# Patient Record
Sex: FEMALE | Race: WHITE | NOT HISPANIC OR LATINO | Employment: FULL TIME | URBAN - METROPOLITAN AREA
[De-identification: names, ages, dates, MRNs, and addresses within clinical notes are randomized per-mention and may not be internally consistent; named-entity substitution may affect disease eponyms.]

---

## 2017-01-03 DIAGNOSIS — F33.2 MAJOR DEPRESSIVE DISORDER, RECURRENT, SEVERE WITHOUT PSYCHOTIC FEATURES (H): Primary | ICD-10-CM

## 2017-01-03 RX ORDER — ARIPIPRAZOLE 10 MG/1
10 TABLET ORAL DAILY
Qty: 30 TABLET | Refills: 0 | Status: SHIPPED | OUTPATIENT
Start: 2017-01-03 | End: 2017-01-06

## 2017-01-03 NOTE — TELEPHONE ENCOUNTER
Last appt: 11/29/16  RTC: not specified  Can: none  No show: none  Pen: 1/6/17    Rec'd refill request for Abilify 10 mg QD from [facility], 30 d/s last filled 11/10/16.    Med filled per  Protocol.

## 2017-01-06 ENCOUNTER — OFFICE VISIT (OUTPATIENT)
Dept: PSYCHIATRY | Facility: CLINIC | Age: 27
End: 2017-01-06
Attending: PSYCHIATRY & NEUROLOGY
Payer: COMMERCIAL

## 2017-01-06 DIAGNOSIS — F33.2 MAJOR DEPRESSIVE DISORDER, RECURRENT, SEVERE WITHOUT PSYCHOTIC FEATURES (H): Primary | ICD-10-CM

## 2017-01-06 DIAGNOSIS — F33.2 SEVERE EPISODE OF RECURRENT MAJOR DEPRESSIVE DISORDER, WITHOUT PSYCHOTIC FEATURES (H): ICD-10-CM

## 2017-01-06 DIAGNOSIS — F98.8 ATTENTION DEFICIT DISORDER: ICD-10-CM

## 2017-01-06 RX ORDER — ARIPIPRAZOLE 10 MG/1
10 TABLET ORAL DAILY
Qty: 30 TABLET | Refills: 2 | Status: SHIPPED | OUTPATIENT
Start: 2017-01-06 | End: 2017-05-24

## 2017-01-06 RX ORDER — DEXTROAMPHETAMINE SULFATE 10 MG/1
10 TABLET ORAL DAILY
Qty: 30 TABLET | Refills: 0 | Status: SHIPPED | OUTPATIENT
Start: 2017-01-06 | End: 2017-02-13

## 2017-01-06 RX ORDER — LAMOTRIGINE 200 MG/1
200 TABLET ORAL DAILY
Qty: 30 TABLET | Refills: 2 | Status: SHIPPED | OUTPATIENT
Start: 2017-01-06 | End: 2017-02-13

## 2017-01-06 RX ORDER — LISDEXAMFETAMINE DIMESYLATE 70 MG/1
140 CAPSULE ORAL 2 TIMES DAILY
Qty: 120 CAPSULE | Refills: 0 | Status: SHIPPED | OUTPATIENT
Start: 2017-01-06 | End: 2017-02-13

## 2017-01-09 ENCOUNTER — OFFICE VISIT (OUTPATIENT)
Dept: PSYCHIATRY | Facility: CLINIC | Age: 27
End: 2017-01-09
Attending: PSYCHOLOGIST
Payer: COMMERCIAL

## 2017-01-09 DIAGNOSIS — F32.9 MAJOR DEPRESSIVE DISORDER WITH SINGLE EPISODE, REMISSION STATUS UNSPECIFIED: ICD-10-CM

## 2017-01-09 DIAGNOSIS — F50.819 BINGE EATING DISORDER: Primary | ICD-10-CM

## 2017-01-09 NOTE — PROGRESS NOTES
"Erick is generally feeling better, but school is not in session now.   She needs \"6 months of mental stability\" before she can have bariatric surgery.  Not involved in any out of home activities.  Uses CPAP regularly.  School at [employer] starts on Monday.  Last semester she got an A and a B.  Her mood is better.  She has excess sleep.  She's been overeating \"a little bit\" but it's overall improved.  Concentration is good.  Energy and motivation are better than had been but energy very low.  She enjoyed her sister's birthday.  No homicidal ideation.  Passive suicidal ideation.  No flashbacks.  No auditory or visual hallucinations.  No paranoid or ideas of reference.  Anxiety is not problematic.  Infrequent panic attacks.  No OCD.  No alcohol use for the past several months.  No substance use.   MENTAL STATUS EXAMINATION:  Erick is cooperative.  She has good grooming.  Affect is bright. Movements are normal.  No psychosis or homicidal ideation.  Adequate recent recall.  Eye contact is good.  Mood is reactive.  Speech rate is normal.  Thought process is normal.  Passive wishes of death.  Orientation x 4.   CURRENT MEDICATIONS:   1. Abilify 10 mg   2. Wellbutrin  mg   3. Prozac 80 mg   4. Lamictal 200 mg   5. Vyvanse 140 mg BID   6. Losartan   7. Dexedrine 10 mg   No side effects.   ASSESSMENT:  MDD recurrent, moderate severity.  Morbid obesity.   PLAN:  No change in medications.  Starts DBT on 2017.  Follow up 2nd week in February.   This was a 25 minute face-to-face encounter of which >50% of the time was spent in reviewing what do with meds after bariatric surgery.         MARVEL ZAVALA MD             D: 2017 13:45   T: 2017 14:07   MT: [id number]      Name:     ERICK ORELLANA   MRN:      [id number]        Account:      [id number]   :      1990           Service Date: 2017      Document: [id number]    "

## 2017-01-11 ENCOUNTER — OFFICE VISIT (OUTPATIENT)
Dept: PSYCHIATRY | Facility: CLINIC | Age: 27
End: 2017-01-11
Attending: PSYCHOLOGIST
Payer: COMMERCIAL

## 2017-01-11 DIAGNOSIS — F98.8 ATTENTION DEFICIT DISORDER: ICD-10-CM

## 2017-01-11 DIAGNOSIS — F32.9 MAJOR DEPRESSIVE DISORDER WITH SINGLE EPISODE, REMISSION STATUS UNSPECIFIED: Primary | ICD-10-CM

## 2017-01-11 DIAGNOSIS — F50.819 BINGE EATING DISORDER: ICD-10-CM

## 2017-01-12 NOTE — PROGRESS NOTES
"DBT Skills Group       Group Time:  2 hours--First Group    DBT (Dialectic Behavior Therapy) is a cognitive behavioral therapy model that uses modeling, behavior rehearsal and guided participation to teach new skills and offer the patient and their family the opportunity to practice new behaviors.    Client: Lexus Uribe  Date: January 9, 2017  Number of Participants: 5    Diagnostic Criteria for group participation: History of suicidal ideation or self-injurious behavior in the past six months.    Diagnosis: Depression, Mild          Binge-Eating Disorder (F50.81)      On time? YES,     How Late?0 minutes    Group Topic for Today: Introductions of new members--Lexus's first group, [place] to Mindfulness Module and Wise Mind, The \"What\" Skills.    Diary Card? YES,      Response to Intervention Client used:  Wise Mind, Nonjudgmental stance: self/others    Subjective Observation: Lexus made it to group from her [facility] class and then had a 5:30pm class. She introduced herself and reported that she had a new kitten and kitten's mom. She spent 3 hours on Saturday \"being nonjudgmental\" reportedly.     Progress towards TX Goals: Minimal    Objective Observation: Attentive   , Active Participant, Cooperative    "

## 2017-01-16 ENCOUNTER — TELEPHONE (OUTPATIENT)
Dept: PSYCHIATRY | Facility: CLINIC | Age: 27
End: 2017-01-16

## 2017-01-16 NOTE — Clinical Note
Prior Authorization Appeal- Vyvanse (lisdexamfetamine) 70 mg caps  To:  Prescription Claim Appeals [facility]  [address] [id number]  [place] [address]  Fax: [phone]    Re: Lexus Uribe  : 1990  ID: [id number]    [employer] Appeals,    Please accept this letter as an Appeal to the Prior Authorization denial of this pt s Vyvanse 70 mg tab prescription (#120 caps/30 days).  Per the PA denial letter, this medication is only covered if it does not exceed the maximum safe dose.   Patient has been on this medication since  for the treatment of Attention deficit disorder, although has trialed and failed multiple medications (see below).  She has taken greater than the recommended maximum dose of Vyvanse for several years, with current dosing of 140 mg twice daily since prior to 2016.  Pt is tolerating this medication well and not having any side effects at current dose.  Lower doses have been trialed and have been ineffective for pt's focus, control, and impulses. Current dose has proven itself effective in addressing ADHD symptoms and is noteworthy for it's effectiveness in keeping pt organized, focused, and less impulsive. Pt is currently able to attend college and has been successful in keeping up in coursework at current dose.    Pt also has a diagnosis of Major depressive d/o, recurrent and severe.  Pt's mood and ADHD symptoms are currently stable, so there is no clinical indication to change medication regimen.  Pt has recently changed insurance plans.  Vyvanse 140 mg BID was previously authorized by private insurances through [employer], [employer], and [employer].  Please authorize medication to allow for continued symptom mgmt and to prevent decompensation, as mood symptoms will likely worsen secondary to medication not being approved.    Previously Tried and Failed Therapies:  -Concerta (prior to 1st appt here on 2/23/15), ineffective, wore off too soon  -Methylin  (prior to 1st appt here on  2/23/15), ineffective, wore off too soon  -Ritalin (prior to 1st appt here on 2/23/15), ineffective, wore off too soon  -Adderall XR (prior to 1st appt here on 2/23/15), ineffective, wore off too soon, irritable  -Dextrostat is currently used for afternoon studying  -Vyvanse at lower does, wore off too soon

## 2017-01-16 NOTE — TELEPHONE ENCOUNTER
Prior Authorization Retail Medication Request  Medication/Dose: Vyvanse 70 MG Capsule, 2 caps po BID, #120=30 d/s  Diagnosis and ICD code: Attention deficit disorder [F98.8]     Previously Tried and Failed Therapies:  -Concerta (prior to 1st appt here on 2/23/15), ineffective, wore off too soon  -Methylin  (prior to 1st appt here on 2/23/15), ineffective, wore off too soon  -Ritalin (prior to 1st appt here on 2/23/15), ineffective, wore off too soon  -Adderall XR (prior to 1st appt here on 2/23/15), ineffective, wore off too soon, irritable  -Dextrostat is currently used for afternoon studying  -Vyvanse at lower does, wore off too soon    Rationale:  Pt has taken Vyvanse 140 mg BID for several years.  Lower doses have been trialed and are only partially therapeutic.  It has proven itself effective in addressing ADHD symptoms.  Without currently effective dosing she will not be successful in school.  She has no side effects with current dosing.     Insurance ID (if provided): [employer] ID # [id number]  Insurance Phone (if provided): [phone]    If you received a fax notification from an outside Pharmacy:  [id number]   Pharmacy #:[phone]    Pharmacy Fax [phone]

## 2017-01-19 ENCOUNTER — OFFICE VISIT (OUTPATIENT)
Dept: PSYCHIATRY | Facility: CLINIC | Age: 27
End: 2017-01-19
Attending: PSYCHOLOGIST
Payer: COMMERCIAL

## 2017-01-19 DIAGNOSIS — F50.819 BINGE EATING DISORDER: ICD-10-CM

## 2017-01-19 DIAGNOSIS — F32.9 MAJOR DEPRESSIVE DISORDER WITH SINGLE EPISODE, REMISSION STATUS UNSPECIFIED: Primary | ICD-10-CM

## 2017-01-19 DIAGNOSIS — F98.8 ATTENTION DEFICIT DISORDER: ICD-10-CM

## 2017-01-19 NOTE — TELEPHONE ENCOUNTER
1/16/17 AT 1520:  LVM FOR PT REQUESTING CB TO DISCUSS OTHER MEDS TRIED AND LOWER DOSES OF VYVANSE THAT WERE TRIED AND THE RESULTS OF THOSE.  WILL COMPLETE PA WHEN PT CALLS BACK WITH INFO.      1/19/17 AT 1320:  SPOKE WITH PT.  COMPLETED PA INFO BELOW.  SENT TO PA TEAM.

## 2017-01-23 ENCOUNTER — OFFICE VISIT (OUTPATIENT)
Dept: PSYCHIATRY | Facility: CLINIC | Age: 27
End: 2017-01-23
Attending: PSYCHOLOGIST
Payer: COMMERCIAL

## 2017-01-23 DIAGNOSIS — F50.819 BINGE EATING DISORDER: ICD-10-CM

## 2017-01-23 DIAGNOSIS — F32.9 MAJOR DEPRESSIVE DISORDER WITH SINGLE EPISODE, REMISSION STATUS UNSPECIFIED: Primary | ICD-10-CM

## 2017-01-23 NOTE — PROGRESS NOTES
" Psychotherapy, 60 minutes  Dx:  Depression- (F32.9);Attention Deficit (F98.8) Obesity     S/O:  \"I keep avoiding Xena's calls. I don't really want to talk to her.\"  Lexus came in and stated above. She reported that she has been feeling like the therapy is more about Xena and \"saving my life\" than what I need.\"  We discussed how she is 26 years old and could d/c the therapy--especially since her family pays out of pocket.     Other issues discussed:   1Reviewed diary card. Her and the nutrition came up with 6 oreos at a time--but then she eats them 3-4x a day. Discussed keeping desserts to 1 or 2 per day.  2.  Parents clean and cook for her.   3, She doesn't feel like she can work anywhere do to wt and lack of mobility. Lays on bed most of day..  5. Will start  DBT--continued  orienting her to our DBT. She doesn't remember many of the skills.  6.  Registered for 2 classes:  Women's study and economics. She believes she would be a Tommy. Her options are [facility], [place], [facility] or [facility] reportedly.    Assessment: Lexus came on time.  She is excited to start  DBT. She has a hx of self harm--cutting behavior at the age of 13 yrs \"experimenting\" and has binged drank on and off. . She believes that \"I thrive with structure.\" She is currently unemployed and being \"taken care of by her parents both financially in her apartment\" and also reports that \"my mother does all of the cleaning and cooking. She is socially isolated and seems to be waiting for the bariatric surgery before she makes any other changes due to the lack of mobility but is assuring therapist she is committed to doing dbt.     Plan:.Continue individual therapy. Do the diary card. Cont orientation for DBT.             "

## 2017-01-25 NOTE — TELEPHONE ENCOUNTER
[facility] Prior Authorization Team   Phone: [phone]  Fax: [phone]    PA Initiation    Medication: Vyvanse 70 MG Capsule  Insurance Company: [facility] - Phone [phone] Fax [phone]  Pharm[facility]illing the Rx[id number] - [place], [place] - [address] [place] [address]  Filling Pharmacy Phone: [phone]  Filling Pharmacy Fax:    Start Date: 1/25/2017

## 2017-01-26 NOTE — PROGRESS NOTES
"DBT Skills Group       Group Time:  2 hours--First Group    DBT (Dialectic Behavior Therapy) is a cognitive behavioral therapy model that uses modeling, behavior rehearsal and guided participation to teach new skills and offer the patient and their family the opportunity to practice new behaviors.    Client: Lexus Uribe  Date: January 23, 2017  Number of Participants:7    Diagnostic Criteria for group participation: History of suicidal ideation or self-injurious behavior in the past six months.    Diagnosis: Depression, Mild          Binge-Eating Disorder (F50.81)      On time? YES,     How Late?0 minutes    Group Topic for Today: Introductions of new members-- Mindfulness--\"How Skills.\" Reviewed the application of the What and How's Skills to get into [employer].    Diary Card? YES,      Response to Intervention Client used:  Wise Mind, Nonjudgmental stance: self/others    Subjective Observation: Lexus made it to group from her [facility] class and then had a 5:30pm class. She came a couple minutes late but in time to start the mindfulness. She was able to provide comments, feedback and ask a couple of questions.      Progress towards TX Goals: Minimal    Objective Observation: Attentive   , Active Participant, Cooperative    "

## 2017-01-30 NOTE — TELEPHONE ENCOUNTER
Insurance called with additional questions, these were answered and Pa is being sent for further review. Rep stated we should receive response within 24 hours. Will update when received.

## 2017-01-30 NOTE — PROGRESS NOTES
" Psychotherapy, 60 minutes  Dx:  Depression- (F32.9);Attention Deficit (F98.8) Obesity     S/O:  \"I  Did the diary card a little. I really didn't go anywhere b/c I have been sick.\"  \"My classes did start for school.\"  Lexus came in and reported above. We discussed how difficult it is for her to get around due to her wt--yet the doctor has decided that she has to wait 6 months and needs to loose wt and stablized inorder to get coverage.     Other issues discussed:   1Reviewed diary card. She is continuing to eat 3-4 servings of oreos a day.  Discussed keeping desserts to 1 or 2 per day.  2.  Parents clean and cook for her.   3, She doesn't feel like she can work anywhere do to wt and lack of mobility. Lays on bed most of day..  5. Will start  DBT--continued  orienting her to our DBT. She doesn't remember many of the skills.  6.  Registered for 2 classes:  Women's study and economics. She believes she would be a Tommy. Her options are [facility], [facility], [facility] or [facility].    Assessment: Lexus came on time. Concern regarding her not filling out the DBT card and she knows that this is mandatory.  She believes that \"I thrive with structure.\" She is currently unemployed and being \"taken care of by her parents both financially in her apartment\" and also reports that \"my mother does all of the cleaning and cooking. She is socially isolated and seems to be waiting for the bariatric surgery before she makes any other changes due to the lack of mobility but is assuring therapist she is committed to doing dbt.     Plan:.Continue individual therapy. Do the diary card. Cont orientation for DBT.             "

## 2017-01-31 NOTE — TELEPHONE ENCOUNTER
PRIOR AUTHORIZATION DENIED    Medication: Vyvanse 70 MG Capsule- denied    Denial Date: 1/30/2017    Denial Rational: script is denied because Dr prescribed over the maximum safe dose.              Appeal Information:

## 2017-02-02 ENCOUNTER — OFFICE VISIT (OUTPATIENT)
Dept: PSYCHIATRY | Facility: CLINIC | Age: 27
End: 2017-02-02
Attending: PSYCHOLOGIST
Payer: COMMERCIAL

## 2017-02-02 DIAGNOSIS — F32.9 MAJOR DEPRESSIVE DISORDER WITH SINGLE EPISODE, REMISSION STATUS UNSPECIFIED: Primary | ICD-10-CM

## 2017-02-02 DIAGNOSIS — F50.819 BINGE EATING DISORDER: ICD-10-CM

## 2017-02-02 DIAGNOSIS — F98.8 ATTENTION DEFICIT DISORDER: ICD-10-CM

## 2017-02-02 NOTE — MR AVS SNAPSHOT
After Visit Summary   2/2/2017    Lexus Uribe    MRN: [id number]           Patient Information     Date Of Birth          1990        Visit Information        Provider Department      2/2/2017 2:00 PM Yuni Pond, PhD  Psychiatry Clinic        Today's Diagnoses     Major depressive disorder with single episode, remission status unspecified    -  1    Binge eating disorder        Attention deficit disorder           Follow-ups after your visit        Your next 10 appointments already scheduled     Feb 13, 2017  2:00 PM CST   Adult Med Follow UP with Bret Robledo MD   Psychiatry Clinic ([facility])    [address] [facility] [id number] [phone]            Feb 16, 2017  1:00 PM CST   Adult Psychotherapy with Yuni Pond, PhD    Psychiatry Clinic ([facility])    [address] [facility] [id number] [phone]              Who to contact     Please call your clinic at [phone] to:    Ask questions about your health    Make or cancel appointments    Discuss your medicines    Learn about your test results    Speak to your doctor   If you have compliments or concerns about an experience at your clinic, or if you wish to file a complaint, please contact [facility] Patient Relations at [phone] or email us at [email or web address]         Additional Information About Your Visit        MyChart Information     [employer] is an electronic gateway that provides easy, online access to your medical records. With [employer], you can request a clinic appointment, read your test results, renew a prescription or communicate with your care team.     To sign up for [employer] visit the website at [email or web address]   You will be asked to enter the access code listed below, as well as some personal information. Please follow the  directions to create your username and password.     Your access code is: [email or web address]  Expires: 2017  1:55 AM     Your access code will  in 90 days. If you need help or a new code, please contact your [facility] or call [phone] for assistance.        Care EveryWhere ID     This is your Care EveryWhere ID. This could be used by other organizations to access your [place] medical records  [id number]         Blood Pressure from Last 3 Encounters:   16 (!) 165/109   16 (!) 170/123   13 (!) 176/108    Weight from Last 3 Encounters:   16 (!) 191.1 kg (421 lb 3.2 oz)   16 (!) 188.8 kg (416 lb 3.2 oz)   16 (!) 181.4 kg (400 lb)              Today, you had the following     No orders found for display       Primary Care Provider Office Phone # Fax #    Renetta Shafer [phone]       [facility] [address] [facility] [address]        Thank you!     Thank you for choosing PSYCHIATRY CLINIC  for your care. Our goal is always to provide you with excellent care. Hearing back from our patients is one way we can continue to improve our services. Please take a few minutes to complete the written survey that you may receive in the mail after your visit with us. Thank you!             Your Updated Medication List - Protect others around you: Learn how to safely use, store and throw away your medicines at www.disposemymeds.org.          This list is accurate as of: 17 11:59 PM.  Always use your most recent med list.                   Brand Name Dispense Instructions for use    ARIPiprazole 10 MG tablet    ABILIFY    30 tablet    Take 1 tablet (10 mg) by mouth daily       buPROPion 150 MG 24 hr tablet    WELLBUTRIN XL    60 tablet    Take 2 tablets (300 mg) by mouth daily       dextroamphetamine 10 MG tablet    DEXTROSTAT    30 tablet    Take 1 tablet (10 mg) by mouth daily       FLUoxetine 40 MG capsule    PROzac    60  capsule    Take 2 capsules (80 mg) by mouth daily       lamoTRIgine 200 MG tablet    LaMICtal    30 tablet    Take 1 tablet (200 mg) by mouth daily       lisdexamfetamine 70 MG capsule    VYVANSE    120 capsule    Take 2 capsules (140 mg) by mouth 2 times daily       * LOSARTAN POTASSIUM PO      Take 25 mg by mouth daily       * losartan 25 MG tablet    COZAAR    1 tablet    Take 1 tablet (25 mg) by mouth daily       OMEPRAZOLE PO      Take 40 mg by mouth every morning       omeprazole-sodium bicarbonate  MG per capsule    ZEGERID     1 capsule       ZANTAC PO      Take 300 mg by mouth At Bedtime       * Notice:  This list has 2 medication(s) that are the same as other medications prescribed for you. Read the directions carefully, and ask your doctor or other care provider to review them with you.

## 2017-02-06 ENCOUNTER — OFFICE VISIT (OUTPATIENT)
Dept: PSYCHIATRY | Facility: CLINIC | Age: 27
End: 2017-02-06
Attending: PSYCHOLOGIST
Payer: COMMERCIAL

## 2017-02-06 DIAGNOSIS — F32.9 MAJOR DEPRESSIVE DISORDER WITH SINGLE EPISODE, REMISSION STATUS UNSPECIFIED: Primary | ICD-10-CM

## 2017-02-06 DIAGNOSIS — F33.2 MAJOR DEPRESSIVE DISORDER, RECURRENT, SEVERE WITHOUT PSYCHOTIC FEATURES (H): ICD-10-CM

## 2017-02-06 DIAGNOSIS — F50.819 BINGE EATING DISORDER: ICD-10-CM

## 2017-02-07 NOTE — PROGRESS NOTES
"DBT Skills Group       Group Time:  2 hours--    DBT (Dialectic Behavior Therapy) is a cognitive behavioral therapy model that uses modeling, behavior rehearsal and guided participation to teach new skills and offer the patient and their family the opportunity to practice new behaviors.    Client: Lexus Uribe  Date: February 6, 2017  Number of Participants:5    Diagnostic Criteria for group participation: History of suicidal ideation or self-injurious behavior in the past six months.    Diagnosis: Depression, Mild          Binge-Eating Disorder (F50.81)      On time? YES,     How Late? 10 minutes, she comes from a class    Group Topic for Today: Overview of  the Interpersonal Module: Effective communication--determining what variables are most important--1) the objective, 2) the relationship, or 2) self-respect. And then reviewed MITZY components.    Diary Card? YES,      Response to Intervention Client used:  Wise Mind, Nonjudgmental stance: self/others, self soothe.    Subjective Observation: Lexus made it to group from her [facility] class and then had a 5:30pm class--she did not complete the diary card nor the homework. We will need to assess her commitment to DBT.  She missed the mindfulness.  She reported that she used skills since she was abruptly taken off of stimulant medication. She reported difficulties completing her college homework b/c of really \"relying on the medication.\" She brought up that \"my parents may be too helpful, they are also the only people I really interact with.\"      Progress towards TX Goals: Minimal    Objective Observation: Attentive   , Active Participant, Cooperative    "

## 2017-02-07 NOTE — TELEPHONE ENCOUNTER
Meds/Venkat  Received: Today       Angélica Dennison, Tiara TRAN, RN                   Shaila from [facility] pharmacy is caller.   Ph: [phone]     Calling to check on the status of a PA for Vyvanse.

## 2017-02-07 NOTE — TELEPHONE ENCOUNTER
Per request of Dr. Robledo and pt, pursue PA appeal.  Pt sent provider email with information to include in PA:    From: Lexus Uribe <[email or web address]>  Date: Fri, Feb 3, 2017 at 10:04 AM  Subject: Vyvanse  To: Brte Robledo MD [email or web address]    Hi Dr. Robledo,    I spoke with [employer] today and it sounds like the next step is for you to file an appeal directly with [employer].  Here's some information that may be helpful:    1.     I have been privately insured for the past 25 years and have been taking Vyvanse since 2007. (10 Years)  2.     Dosage approved via Prior Authorization by [employer] and [employer] and [employer] ([employer] 2016)  3.     I have tried Concerta, Ritalin, Adderol, Strattera  4.     I m on this medication because it helps me stay organized, focus and be less impulsive.   5.     I m on this dosage because I wasn t able to focus/control impulse on lower dose.  6.     I need this dosage because it s the therapeutic amount and allows me to function on a daily basis.    Thank you so much for working on this, I know it's a pain.    Lexus POWERS Appeal drafted and routed to PA team.

## 2017-02-08 NOTE — TELEPHONE ENCOUNTER
Medication Appeal Initiation    We have initiated an appeal for the requested medication:  Medication: Vyvanse 70 MG Capsule- denied  Appeal Start Date:  2/8/2017  Insurance Company:    Comments:   Appeal has been sent to insurance along with appeal letter.

## 2017-02-10 ENCOUNTER — OFFICE VISIT (OUTPATIENT)
Dept: PSYCHIATRY | Facility: CLINIC | Age: 27
End: 2017-02-10
Attending: PSYCHOLOGIST
Payer: COMMERCIAL

## 2017-02-10 DIAGNOSIS — F32.9 MAJOR DEPRESSIVE DISORDER WITH SINGLE EPISODE, REMISSION STATUS UNSPECIFIED: Primary | ICD-10-CM

## 2017-02-10 DIAGNOSIS — F50.819 BINGE EATING DISORDER: ICD-10-CM
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
## 2017-02-13 ENCOUNTER — OFFICE VISIT (OUTPATIENT)
Dept: PSYCHIATRY | Facility: CLINIC | Age: 27
End: 2017-02-13
Attending: PSYCHIATRY & NEUROLOGY
Payer: COMMERCIAL

## 2017-02-13 ENCOUNTER — OFFICE VISIT (OUTPATIENT)
Dept: PSYCHIATRY | Facility: CLINIC | Age: 27
End: 2017-02-13
Attending: PSYCHOLOGIST
Payer: COMMERCIAL

## 2017-02-13 DIAGNOSIS — F33.2 SEVERE EPISODE OF RECURRENT MAJOR DEPRESSIVE DISORDER, WITHOUT PSYCHOTIC FEATURES (H): ICD-10-CM

## 2017-02-13 DIAGNOSIS — F33.1 MODERATE EPISODE OF RECURRENT MAJOR DEPRESSIVE DISORDER (H): ICD-10-CM

## 2017-02-13 DIAGNOSIS — F32.9 MAJOR DEPRESSIVE DISORDER WITH SINGLE EPISODE, REMISSION STATUS UNSPECIFIED: Primary | ICD-10-CM

## 2017-02-13 DIAGNOSIS — F98.8 ATTENTION DEFICIT DISORDER: ICD-10-CM

## 2017-02-13 DIAGNOSIS — F50.819 BINGE EATING DISORDER: ICD-10-CM

## 2017-02-13 RX ORDER — LISDEXAMFETAMINE DIMESYLATE 70 MG/1
140 CAPSULE ORAL 2 TIMES DAILY
Qty: 120 CAPSULE | Refills: 0 | Status: SHIPPED | OUTPATIENT
Start: 2017-02-13 | End: 2017-03-06

## 2017-02-13 RX ORDER — FLUOXETINE 40 MG/1
80 CAPSULE ORAL DAILY
Qty: 60 CAPSULE | Refills: 2 | Status: SHIPPED | OUTPATIENT
Start: 2017-02-13 | End: 2017-05-10

## 2017-02-13 RX ORDER — DEXTROAMPHETAMINE SULFATE 10 MG/1
10 TABLET ORAL
Qty: 60 TABLET | Refills: 0 | Status: SHIPPED | OUTPATIENT
Start: 2017-02-13 | End: 2017-03-06

## 2017-02-13 RX ORDER — DEXTROAMPHETAMINE SULFATE 10 MG/1
10 TABLET ORAL DAILY
Qty: 30 TABLET | Refills: 0 | Status: SHIPPED | OUTPATIENT
Start: 2017-02-13 | End: 2017-02-13

## 2017-02-13 RX ORDER — LAMOTRIGINE 200 MG/1
200 TABLET ORAL 2 TIMES DAILY
Qty: 60 TABLET | Refills: 2 | Status: SHIPPED | OUTPATIENT
Start: 2017-02-13 | End: 2017-07-31

## 2017-02-13 NOTE — PROGRESS NOTES
" Psychotherapy, 60 minutes  Dx:  Depression- (F32.9);Attention Deficit (F98.8) Obesity     S/O:  \"I  Don't have my diary card --I didn't do it. I left my manual in the card with the diary card in it.\"  Yoni came in and reported above. We discussed how important it is for her to do a diary card to be compliant and adherent with DBT, It is hard, though, to work on skills if she doesn't see anyone.     Other issues discussed:   1.No change in binging on oreos--she has not bagged them.  3-4  servings of  6 oreos  a day.  Discussed keeping desserts to 1 or 2 per day.  2.  Yoni denied that mom cooks for her yet last week she said mom cleaned and cooked for her . .   3, She doesn't feel like she can work anywhere do to wt and lack of mobility. Lays on bed most of day..  4. She likes the DBT group.  5..  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 Her options are [employer], [place] [facility], u [facility] or [facility].    Assessment: Yoni came on time. Concern regarding her not filling out the DBT card and she knows that this is mandatory.  Social isolation except for parents and sister who is in her welding class.She reports that   \"I thrive with structure\" yet limited structure. She is currently unemployed and being \"taken care of by her parents both financially in her apartment\" and also reports that \"my mother does all of the cleaning and cooking. She is socially isolated and seems to be waiting for the bariatric surgery before she makes any other changes due to the lack of mobility but is assuring therapist she is committed to doing dbt.     Plan:.Continue individual therapy. Do the diary card. Cont orientation for DBT.             "

## 2017-02-13 NOTE — MR AVS SNAPSHOT
After Visit Summary   2017    Lexus Uribe    MRN: [id number]           Patient Information     Date Of Birth          1990        Visit Information        Provider Department      2017 2:30 PM Yuni Pond, PhD  Psychiatry Clinic        Today's Diagnoses     Major depressive disorder with single episode, remission status unspecified    -  1    Binge eating disorder           Follow-ups after your visit        Your next 10 appointments already scheduled     2017  1:00 PM CST   Adult Psychotherapy with Yuni Pond, PhD    Psychiatry Clinic ([facility])    [address]  [address] [facility] [id number] [phone]              Who to contact     Please call your clinic at [phone] to:    Ask questions about your health    Make or cancel appointments    Discuss your medicines    Learn about your test results    Speak to your doctor   If you have compliments or concerns about an experience at your clinic, or if you wish to file a complaint, please contact [facility] Patient Relations at [phone] or email us at [email or web address]         Additional Information About Your Visit        MyChart Information     Pintleyt is an electronic gateway that provides easy, online access to your medical records. With [employer], you can request a clinic appointment, read your test results, renew a prescription or communicate with your care team.     To sign up for Pintleyt visit the website at [email or web address]   You will be asked to enter the access code listed below, as well as some personal information. Please follow the directions to create your username and password.     Your access code is: [email or web address]  Expires: 2017  1:55 AM     Your access code will  in 90 days. If you need help or a new code, please contact your [facility] or call  [phone] for assistance.        Care EveryWhere ID     This is your Care EveryWhere ID. This could be used by other organizations to access your [place] medical records  [id number]         Blood Pressure from Last 3 Encounters:   04/18/16 (!) 165/109   03/28/16 (!) 170/123   09/21/13 (!) 176/108    Weight from Last 3 Encounters:   08/24/16 (!) 191.1 kg (421 lb 3.2 oz)   07/26/16 (!) 188.8 kg (416 lb 3.2 oz)   06/03/16 (!) 181.4 kg (400 lb)              Today, you had the following     No orders found for display         Today's Medication Changes          These changes are accurate as of: 2/13/17 11:59 PM.  If you have any questions, ask your nurse or doctor.               These medicines have changed or have updated prescriptions.        Dose/Directions    dextroamphetamine 10 MG tablet   Commonly known as:  DEXTROSTAT   This may have changed:  when to take this   Used for:  Attention deficit disorder   Changed by:  Bret Robledo MD        Dose:  10 mg   Take 1 tablet (10 mg) by mouth 2 times daily   Quantity:  60 tablet   Refills:  0       lamoTRIgine 200 MG tablet   Commonly known as:  LaMICtal   This may have changed:  when to take this   Used for:  Severe episode of recurrent major depressive disorder, without psychotic features (H)   Changed by:  Bret Robledo MD        Dose:  200 mg   Take 1 tablet (200 mg) by mouth 2 times daily   Quantity:  60 tablet   Refills:  2            Where to get your medicines      These medications were sent to [facility]/pharmacy #[id number] - [address] [address]     Phone:  [phone]     FLUoxetine 40 MG capsule    lamoTRIgine 200 MG tablet         Some of these will need a paper prescription and others can be bought over the counter.  Ask your nurse if you have questions.     Bring a paper prescription for each of these medications     dextroamphetamine 10 MG tablet    lisdexamfetamine 70 MG capsule                 Primary Care Provider Office Phone # Fax #    Renetta Shafer [phone]       [facility] [address] [place] [address]        Thank you!     Thank you for choosing PSYCHIATRY CLINIC  for your care. Our goal is always to provide you with excellent care. Hearing back from our patients is one way we can continue to improve our services. Please take a few minutes to complete the written survey that you may receive in the mail after your visit with us. Thank you!             Your Updated Medication List - Protect others around you: Learn how to safely use, store and throw away your medicines at www.disposemymeds.org.          This list is accurate as of: 2/13/17 11:59 PM.  Always use your most recent med list.                   Brand Name Dispense Instructions for use    ARIPiprazole 10 MG tablet    ABILIFY    30 tablet    Take 1 tablet (10 mg) by mouth daily       buPROPion 150 MG 24 hr tablet    WELLBUTRIN XL    60 tablet    Take 2 tablets (300 mg) by mouth daily       dextroamphetamine 10 MG tablet    DEXTROSTAT    60 tablet    Take 1 tablet (10 mg) by mouth 2 times daily       FLUoxetine 40 MG capsule    PROzac    60 capsule    Take 2 capsules (80 mg) by mouth daily       lamoTRIgine 200 MG tablet    LaMICtal    60 tablet    Take 1 tablet (200 mg) by mouth 2 times daily       lisdexamfetamine 70 MG capsule    VYVANSE    120 capsule    Take 2 capsules (140 mg) by mouth 2 times daily       * LOSARTAN POTASSIUM PO      Take 25 mg by mouth daily       * losartan 25 MG tablet    COZAAR    1 tablet    Take 1 tablet (25 mg) by mouth daily       OMEPRAZOLE PO      Take 40 mg by mouth every morning       omeprazole-sodium bicarbonate  MG per capsule    ZEGERID     1 capsule       ZANTAC PO      Take 300 mg by mouth At Bedtime       * Notice:  This list has 2 medication(s) that are the same as other medications prescribed for you. Read the directions carefully, and ask your doctor  or other care provider to review them with you.

## 2017-02-13 NOTE — MR AVS SNAPSHOT
After Visit Summary   2017    Lexus Uribe    MRN: [id number]           Patient Information     Date Of Birth          1990        Visit Information        Provider Department      2017 2:00 PM Bret Robledo MD Psychiatry Clinic        Today's Diagnoses     Attention deficit disorder        Severe episode of recurrent major depressive disorder, without psychotic features (H)        Moderate episode of recurrent major depressive disorder (H)           Follow-ups after your visit        Your next 10 appointments already scheduled     2017  1:00 PM CST   Adult Psychotherapy with Yuni Pond, PhD    Psychiatry Clinic ([facility])    [address]  [address] [facility] [id number] [phone]              Who to contact     Please call your clinic at [phone] to:    Ask questions about your health    Make or cancel appointments    Discuss your medicines    Learn about your test results    Speak to your doctor   If you have compliments or concerns about an experience at your clinic, or if you wish to file a complaint, please contact [facility] Patient Relations at [phone] or email us at [email or web address]         Additional Information About Your Visit        MyChart Information     Likehackt is an electronic gateway that provides easy, online access to your medical records. With [employer], you can request a clinic appointment, read your test results, renew a prescription or communicate with your care team.     To sign up for Likehackt visit the website at [email or web address]   You will be asked to enter the access code listed below, as well as some personal information. Please follow the directions to create your username and password.     Your access code is: [email or web address]  Expires: 2017  1:55 AM     Your access code will  in 90 days. If you need help or a new  code, please contact your [facility] or call [phone] for assistance.        Care EveryWhere ID     This is your Care EveryWhere ID. This could be used by other organizations to access your [place] medical records  [id number]         Blood Pressure from Last 3 Encounters:   04/18/16 (!) 165/109   03/28/16 (!) 170/123   09/21/13 (!) 176/108    Weight from Last 3 Encounters:   08/24/16 (!) 191.1 kg (421 lb 3.2 oz)   07/26/16 (!) 188.8 kg (416 lb 3.2 oz)   06/03/16 (!) 181.4 kg (400 lb)              Today, you had the following     No orders found for display         Today's Medication Changes          These changes are accurate as of: 2/13/17 11:59 PM.  If you have any questions, ask your nurse or doctor.               These medicines have changed or have updated prescriptions.        Dose/Directions    dextroamphetamine 10 MG tablet   Commonly known as:  DEXTROSTAT   This may have changed:  when to take this   Used for:  Attention deficit disorder   Changed by:  Bret Robledo MD        Dose:  10 mg   Take 1 tablet (10 mg) by mouth 2 times daily   Quantity:  60 tablet   Refills:  0       lamoTRIgine 200 MG tablet   Commonly known as:  LaMICtal   This may have changed:  when to take this   Used for:  Severe episode of recurrent major depressive disorder, without psychotic features (H)   Changed by:  Bret Robledo MD        Dose:  200 mg   Take 1 tablet (200 mg) by mouth 2 times daily   Quantity:  60 tablet   Refills:  2            Where to get your medicines      These medications were sent to [facility]/pharmacy #[id number] - [address] [address]     Phone:  [phone]     FLUoxetine 40 MG capsule    lamoTRIgine 200 MG tablet         Some of these will need a paper prescription and others can be bought over the counter.  Ask your nurse if you have questions.     Bring a paper prescription for each of these medications      dextroamphetamine 10 MG tablet    lisdexamfetamine 70 MG capsule                Primary Care Provider Office Phone # Fax #    Renetta Shafer [phone]       [facility] [address] [place] [address]        Thank you!     Thank you for choosing PSYCHIATRY CLINIC  for your care. Our goal is always to provide you with excellent care. Hearing back from our patients is one way we can continue to improve our services. Please take a few minutes to complete the written survey that you may receive in the mail after your visit with us. Thank you!             Your Updated Medication List - Protect others around you: Learn how to safely use, store and throw away your medicines at www.disposemymeds.org.          This list is accurate as of: 2/13/17 11:59 PM.  Always use your most recent med list.                   Brand Name Dispense Instructions for use    ARIPiprazole 10 MG tablet    ABILIFY    30 tablet    Take 1 tablet (10 mg) by mouth daily       buPROPion 150 MG 24 hr tablet    WELLBUTRIN XL    60 tablet    Take 2 tablets (300 mg) by mouth daily       dextroamphetamine 10 MG tablet    DEXTROSTAT    60 tablet    Take 1 tablet (10 mg) by mouth 2 times daily       FLUoxetine 40 MG capsule    PROzac    60 capsule    Take 2 capsules (80 mg) by mouth daily       lamoTRIgine 200 MG tablet    LaMICtal    60 tablet    Take 1 tablet (200 mg) by mouth 2 times daily       lisdexamfetamine 70 MG capsule    VYVANSE    120 capsule    Take 2 capsules (140 mg) by mouth 2 times daily       * LOSARTAN POTASSIUM PO      Take 25 mg by mouth daily       * losartan 25 MG tablet    COZAAR    1 tablet    Take 1 tablet (25 mg) by mouth daily       OMEPRAZOLE PO      Take 40 mg by mouth every morning       omeprazole-sodium bicarbonate  MG per capsule    ZEGERID     1 capsule       ZANTAC PO      Take 300 mg by mouth At Bedtime       * Notice:  This list has 2 medication(s) that are the same as other  medications prescribed for you. Read the directions carefully, and ask your doctor or other care provider to review them with you.

## 2017-02-13 NOTE — TELEPHONE ENCOUNTER
MEDICATION APPEAL APPROVED    Medication: Vyvanse 70 MG Capsule- APPEAL APPROVED  Authorization Effective Date: 2/9/2017  Authorization Expiration Date: 6/30/2017  Approved Dose/Quantity:   Reference #: [id number]   Insurance Company: Other (see comments)Comment:  [facility]  [phone]  Expected CoPay: $0.00 PATIENT ALREADY P/U MEDS ON 2/9/17     CoPay Card Available:      Foundation Assistance Needed:    Which Pharmacy is filling the prescription (Not needed for infusion/clinic administered): [facility]/PHARMACY #[id number] - [address]

## 2017-02-14 NOTE — PROGRESS NOTES
"Lexus said things are \"OK\".  She's followed in the bariatric clinic.  She meets for DBT.  Will be considered for bariatric surgery in 5 months.  She's taking 4 classes in school.  Mood is positive.  She has less hypersomnia, uses CPAP.  Appetite is normal.  She needs Dexedrine to do homework.  She could use more energy and motivation.  She has the ability to experience pleasure.  No homicidal or suicidal ideation.  No flashbacks.  No auditory or visual hallucinations.  No paranoia or ideas of reference.  Has situational anxiety.  No panic attacks.  OCD of hair pulling.  No alcohol use since Jana.  No substance use.      MENTAL STATUS EXAMINATION:  Lexus was cooperative and had good grooming.  Affect was bright.  Movements were normal.  No psychosis, no homicidal ideation.  Recent recall was good.  Good eye contact.  Mood was positive.  Speech rate was normal.  Thought process was logical.  No suicidal ideation.  Orientation x 4.      CURRENT MEDICATIONS:   1.  Lamictal 400 mg   2. Abilify 10 mg   3. Vyvanse 140 mg bid   4. Dextrostat 10 mg   5. Wellbutrin  mg   6. Prozac 80 mg      ASSESSMENT:  MDD, recurrent, in remission.  ADD requiring high dose Vyvanse and dextrostat.  She was already on the high dose prior to switching to my care.      PLAN:  No changes except can use dextrostat twice daily before studying.  Follow up in 1 month.      This was a 25 minute face-to-face encounter of which >50% of the time was spent on review of ADD meds.         MARVEL ZAVALA MD             D: 2017 13:31   T: 2017 13:46   MT: IJEOMA      Name:     LEXUS ORELLANA   MRN:      [id number]        Account:      [id number]   :      1990           Service Date: 2017      Document: [id number]      "

## 2017-02-15 NOTE — PROGRESS NOTES
DBT Skills Group       Group Time:  2 hours--    DBT (Dialectic Behavior Therapy) is a cognitive behavioral therapy model that uses modeling, behavior rehearsal and guided participation to teach new skills and offer the patient and their family the opportunity to practice new behaviors.    Client: Lexus Uribe  Date: February 13, 2017  Number of Participants:5    Diagnostic Criteria for group participation: History of suicidal ideation or self-injurious behavior in the past six months.    Diagnosis: Depression, Mild          Binge-Eating Disorder (F50.81)      On time? YES,     How Late? 0 min    Group Topic for Today: Coninued Interpersonal Module:  reviewed MITZY-GIVE. Role-plays around MITZY and GIVE.     Diary Card? YES,      Response to Intervention Client used:  Wise Mind, Nonjudgmental stance: self/others, self soothe.    Subjective Observation: Lexus made it to group from her [facility] class on time--she came on time (did she go to class then??)  She completed her diary card and homework. Difficulties coming up with a MITZY reportedly due to her social isolation.      Progress towards TX Goals: Minimal    Objective Observation: Attentive   , Active Participant, Cooperative

## 2017-02-16 ENCOUNTER — OFFICE VISIT (OUTPATIENT)
Dept: PSYCHIATRY | Facility: CLINIC | Age: 27
End: 2017-02-16
Attending: PSYCHOLOGIST
Payer: COMMERCIAL

## 2017-02-16 DIAGNOSIS — F32.9 MAJOR DEPRESSIVE DISORDER WITH SINGLE EPISODE, REMISSION STATUS UNSPECIFIED: Primary | ICD-10-CM

## 2017-02-16 DIAGNOSIS — I10 BENIGN ESSENTIAL HYPERTENSION: ICD-10-CM

## 2017-02-16 NOTE — MR AVS SNAPSHOT
After Visit Summary   2017    Lexus Uribe    MRN: [id number]           Patient Information     Date Of Birth          1990        Visit Information        Provider Department      2017 1:00 PM Yuni Pond, PhD  Psychiatry Clinic        Today's Diagnoses     Major depressive disorder with single episode, remission status unspecified    -  1    Benign essential hypertension           Follow-ups after your visit        Your next 10 appointments already scheduled     Mar 02, 2017  1:00 PM CST   Adult Psychotherapy with Yuni Pond, PhD    Psychiatry Clinic ([facility])    [address]  [address] [facility] [id number] [phone]              Who to contact     Please call your clinic at [phone] to:    Ask questions about your health    Make or cancel appointments    Discuss your medicines    Learn about your test results    Speak to your doctor   If you have compliments or concerns about an experience at your clinic, or if you wish to file a complaint, please contact [facility] Patient Relations at [phone] or email us at [email or web address]         Additional Information About Your Visit        MyChart Information     [employer] Groupt is an electronic gateway that provides easy, online access to your medical records. With Jamn, you can request a clinic appointment, read your test results, renew a prescription or communicate with your care team.     To sign up for [employer] visit the website at [email or web address]   You will be asked to enter the access code listed below, as well as some personal information. Please follow the directions to create your username and password.     Your access code is: [email or web address]  Expires: 2017  1:55 AM     Your access code will  in 90 days. If you need help or a new code, please contact your [facility] Clinic  or call [phone] for assistance.        Care EveryWhere ID     This is your Care EveryWhere ID. This could be used by other organizations to access your [place] medical records  [id number]         Blood Pressure from Last 3 Encounters:   04/18/16 (!) 165/109   03/28/16 (!) 170/123   09/21/13 (!) 176/108    Weight from Last 3 Encounters:   08/24/16 (!) 191.1 kg (421 lb 3.2 oz)   07/26/16 (!) 188.8 kg (416 lb 3.2 oz)   06/03/16 (!) 181.4 kg (400 lb)              Today, you had the following     No orders found for display       Primary Care Provider Office Phone # Fax #    Renetta Shafer [phone]       [facility] [address] [place] [address]        Thank you!     Thank you for choosing PSYCHIATRY CLINIC  for your care. Our goal is always to provide you with excellent care. Hearing back from our patients is one way we can continue to improve our services. Please take a few minutes to complete the written survey that you may receive in the mail after your visit with us. Thank you!             Your Updated Medication List - Protect others around you: Learn how to safely use, store and throw away your medicines at www.disposemymeds.org.          This list is accurate as of: 2/16/17 11:59 PM.  Always use your most recent med list.                   Brand Name Dispense Instructions for use    ARIPiprazole 10 MG tablet    ABILIFY    30 tablet    Take 1 tablet (10 mg) by mouth daily       buPROPion 150 MG 24 hr tablet    WELLBUTRIN XL    60 tablet    Take 2 tablets (300 mg) by mouth daily       dextroamphetamine 10 MG tablet    DEXTROSTAT    60 tablet    Take 1 tablet (10 mg) by mouth 2 times daily       FLUoxetine 40 MG capsule    PROzac    60 capsule    Take 2 capsules (80 mg) by mouth daily       lamoTRIgine 200 MG tablet    LaMICtal    60 tablet    Take 1 tablet (200 mg) by mouth 2 times daily       lisdexamfetamine 70 MG capsule    VYVANSE    120 capsule    Take 2 capsules (140  mg) by mouth 2 times daily       * LOSARTAN POTASSIUM PO      Take 25 mg by mouth daily       * losartan 25 MG tablet    COZAAR    1 tablet    Take 1 tablet (25 mg) by mouth daily       OMEPRAZOLE PO      Take 40 mg by mouth every morning       omeprazole-sodium bicarbonate  MG per capsule    ZEGERID     1 capsule       ZANTAC PO      Take 300 mg by mouth At Bedtime       * Notice:  This list has 2 medication(s) that are the same as other medications prescribed for you. Read the directions carefully, and ask your doctor or other care provider to review them with you.

## 2017-02-23 ENCOUNTER — OFFICE VISIT (OUTPATIENT)
Dept: PSYCHIATRY | Facility: CLINIC | Age: 27
End: 2017-02-23
Attending: PSYCHOLOGIST
Payer: COMMERCIAL

## 2017-02-23 DIAGNOSIS — F98.8 ATTENTION DEFICIT DISORDER: ICD-10-CM

## 2017-02-23 DIAGNOSIS — F50.819 BINGE EATING DISORDER: ICD-10-CM

## 2017-02-23 DIAGNOSIS — F32.9 MAJOR DEPRESSIVE DISORDER WITH SINGLE EPISODE, REMISSION STATUS UNSPECIFIED: Primary | ICD-10-CM

## 2017-02-23 NOTE — MR AVS SNAPSHOT
After Visit Summary   2/23/2017    Lexus Uribe    MRN: [id number]           Patient Information     Date Of Birth          1990        Visit Information        Provider Department      2/23/2017 2:00 PM Yuni Pond, PhD  Psychiatry Clinic        Today's Diagnoses     Major depressive disorder with single episode, remission status unspecified    -  1    Binge eating disorder        Attention deficit disorder           Follow-ups after your visit        Your next 10 appointments already scheduled     Mar 23, 2017 12:00 PM CDT   Adult Psychotherapy with Yuni Pond, PhD    Psychiatry Clinic ([facility])    [address]  [address] [facility] [address] [phone]            May 05, 2017  1:30 PM CDT   Adult Med Follow UP with Bret Robledo MD   Psychiatry Clinic ([facility])    [address] [facility] [id number] [phone]              Who to contact     Please call your clinic at [phone] to:    Ask questions about your health    Make or cancel appointments    Discuss your medicines    Learn about your test results    Speak to your doctor   If you have compliments or concerns about an experience at your clinic, or if you wish to file a complaint, please contact [facility] Patient Relations at [phone] or email us at [email or web address]         Additional Information About Your Visit        MyChart Information     MENA360t is an electronic gateway that provides easy, online access to your medical records. With [employer], you can request a clinic appointment, read your test results, renew a prescription or communicate with your care team.     To sign up for MENA360t visit the website at [email or web address]   You will be asked to enter the access code listed below, as well as some personal information. Please follow  the directions to create your username and password.     Your access code is: [email or web address]  Expires: 2017  1:55 AM     Your access code will  in 90 days. If you need help or a new code, please contact your [facility] or call [phone] for assistance.        Care EveryWhere ID     This is your Care EveryWhere ID. This could be used by other organizations to access your [place] medical records  [id number]         Blood Pressure from Last 3 Encounters:   16 (!) 165/109   16 (!) 170/123   13 (!) 176/108    Weight from Last 3 Encounters:   16 (!) 191.1 kg (421 lb 3.2 oz)   16 (!) 188.8 kg (416 lb 3.2 oz)   16 (!) 181.4 kg (400 lb)              Today, you had the following     No orders found for display       Primary Care Provider Office Phone # Fax #    Renetta Shafer [phone]       [facility] [address] [facility] [address]        Thank you!     Thank you for choosing PSYCHIATRY CLINIC  for your care. Our goal is always to provide you with excellent care. Hearing back from our patients is one way we can continue to improve our services. Please take a few minutes to complete the written survey that you may receive in the mail after your visit with us. Thank you!             Your Updated Medication List - Protect others around you: Learn how to safely use, store and throw away your medicines at www.disposemymeds.org.          This list is accurate as of: 17 11:59 PM.  Always use your most recent med list.                   Brand Name Dispense Instructions for use    ARIPiprazole 10 MG tablet    ABILIFY    30 tablet    Take 1 tablet (10 mg) by mouth daily       buPROPion 150 MG 24 hr tablet    WELLBUTRIN XL    60 tablet    Take 2 tablets (300 mg) by mouth daily       FLUoxetine 40 MG capsule    PROzac    60 capsule    Take 2 capsules (80 mg) by mouth daily       lamoTRIgine 200 MG tablet    LaMICtal    60  tablet    Take 1 tablet (200 mg) by mouth 2 times daily       * LOSARTAN POTASSIUM PO      Take 25 mg by mouth daily       * losartan 25 MG tablet    COZAAR    1 tablet    Take 1 tablet (25 mg) by mouth daily       OMEPRAZOLE PO      Take 40 mg by mouth every morning       omeprazole-sodium bicarbonate  MG per capsule    ZEGERID     1 capsule       ZANTAC PO      Take 300 mg by mouth At Bedtime       * Notice:  This list has 2 medication(s) that are the same as other medications prescribed for you. Read the directions carefully, and ask your doctor or other care provider to review them with you.

## 2017-02-27 NOTE — PROGRESS NOTES
"DBT Progress Note    Date: February 16, 2017  On time? YES,      How Late?   minutes  Client: Lexus Uribe  Appointment Duration: 55 minutes    Dx:  MDD (F32.9), BED (F50.81)    Diary Card? No,Lexus did not have a comletedt have it with her but shared more honestl.  Pal a d\" Never took the DBT manual inside. Did 2 days--Monday and Tues only.  Spoken to client since last session? No,       Since last session, did the client engage in any of the following behaviors? (If yes, brief description)    Quality-of-life-interfering behavior:  YES, missed class again but better than week before. Low motivation for getting coffee--maybe we should be in charge of this.  Primary targets this session:  Quality of life interfering behavior:  Eating disorder behavior--the need for more energy--physical.    Secondary targets this session:  Self-invalidation    Skills, Self-management    Strategies used in this session:    Dialectical strategies  Behavioral analysis  Commitment strategies  Irreverent communication    Notes/Assignments:  Completed a BCA around missing school. Lexus initially identified \"fear of falling on the ice again.\"  Fell 1/30 but then missed the second week of classes 2/6 and 8th also. er immaturity and functioning at age 13 or 14 yrs old not looking at consequences of her behaviors and \"not caring.\"  She didn't get the reading done for a class so then just slept in and missed 2 days (mom out of town and she can just not go.\" She had a \"smile on her face\" while stating--she said that she is mad at herself for not going. Identified enabling behaviors with mother and she described how living with parents is not an option since mom is so intrusive. Medical issues--she has a \"fatty liver\" due to 2016 large quantities of alcohol. Assess appropriateness for DBT if she continues to not due diary card. \"Exericing helps me to be less resistant, less scared and less avoidant\" but then did not go exercise the past 2 weeks. She " did have her gym clothes and is planning to go on the way home.    Suicide assessment completed? YES,  No SI

## 2017-03-06 ENCOUNTER — OFFICE VISIT (OUTPATIENT)
Dept: PSYCHIATRY | Facility: CLINIC | Age: 27
End: 2017-03-06
Attending: PSYCHIATRY & NEUROLOGY
Payer: COMMERCIAL

## 2017-03-06 ENCOUNTER — OFFICE VISIT (OUTPATIENT)
Dept: PSYCHIATRY | Facility: CLINIC | Age: 27
End: 2017-03-06
Attending: PSYCHOLOGIST
Payer: COMMERCIAL

## 2017-03-06 ENCOUNTER — OFFICE VISIT (OUTPATIENT)
Dept: PSYCHIATRY | Facility: CLINIC | Age: 27
End: 2017-03-06

## 2017-03-06 DIAGNOSIS — F63.3 TRICHOTILLOMANIA: ICD-10-CM

## 2017-03-06 DIAGNOSIS — F50.819 BINGE EATING DISORDER: ICD-10-CM

## 2017-03-06 DIAGNOSIS — F98.8 ATTENTION DEFICIT DISORDER: ICD-10-CM

## 2017-03-06 DIAGNOSIS — F33.1 MODERATE EPISODE OF RECURRENT MAJOR DEPRESSIVE DISORDER (H): Primary | ICD-10-CM

## 2017-03-06 RX ORDER — DEXTROAMPHETAMINE SULFATE 10 MG/1
10 TABLET ORAL
Qty: 60 TABLET | Refills: 0 | Status: SHIPPED | OUTPATIENT
Start: 2017-03-06 | End: 2017-05-05

## 2017-03-06 RX ORDER — LISDEXAMFETAMINE DIMESYLATE 70 MG/1
140 CAPSULE ORAL 2 TIMES DAILY
Qty: 120 CAPSULE | Refills: 0 | Status: SHIPPED | OUTPATIENT
Start: 2017-03-06 | End: 2017-03-06

## 2017-03-06 RX ORDER — LISDEXAMFETAMINE DIMESYLATE 70 MG/1
140 CAPSULE ORAL 2 TIMES DAILY
Qty: 120 CAPSULE | Refills: 0 | Status: SHIPPED | OUTPATIENT
Start: 2017-03-06 | End: 2017-05-05

## 2017-03-06 RX ORDER — DEXTROAMPHETAMINE SULFATE 10 MG/1
10 TABLET ORAL
Qty: 60 TABLET | Refills: 0 | Status: SHIPPED | OUTPATIENT
Start: 2017-03-06 | End: 2017-03-06

## 2017-03-06 NOTE — MR AVS SNAPSHOT
After Visit Summary   3/6/2017    Lexus Uribe    MRN: [id number]           Patient Information     Date Of Birth          1990        Visit Information        Provider Department      3/6/2017 6:34 PM Yuni Pond, PhD  Psychiatry Clinic        Today's Diagnoses     Moderate episode of recurrent major depressive disorder (H)    -  1    Binge eating disorder        Trichotillomania           Follow-ups after your visit        Your next 10 appointments already scheduled     Mar 07, 2017  4:00 PM CST   Adult Psychotherapy with Yuni Pond, PhD    Psychiatry Clinic ([facility])    [address]  [address] [facility] [address] [phone]            May 05, 2017  1:30 PM CDT   Adult Med Follow UP with Bret Robledo MD   Psychiatry Clinic ([facility])    [address] [facility] [id number] [phone]              Who to contact     Please call your clinic at [phone] to:    Ask questions about your health    Make or cancel appointments    Discuss your medicines    Learn about your test results    Speak to your doctor   If you have compliments or concerns about an experience at your clinic, or if you wish to file a complaint, please contact [facility] Patient Relations at [phone] or email us at [email or web address]         Additional Information About Your Visit        MyChart Information     [employer] Private is an electronic gateway that provides easy, online access to your medical records. With [employer] Private, you can request a clinic appointment, read your test results, renew a prescription or communicate with your care team.     To sign up for Anthem Digital Mediat visit the website at [email or web address]   You will be asked to enter the access code listed below, as well as some personal information. Please follow the directions to create your  username and password.     Your access code is: [email or web address]  Expires: 2017  1:55 AM     Your access code will  in 90 days. If you need help or a new code, please contact your [facility] or call [phone] for assistance.        Care EveryWhere ID     This is your Care EveryWhere ID. This could be used by other organizations to access your [place] medical records  [id number]         Blood Pressure from Last 3 Encounters:   16 (!) 165/109   16 (!) 170/123   13 (!) 176/108    Weight from Last 3 Encounters:   16 (!) 191.1 kg (421 lb 3.2 oz)   16 (!) 188.8 kg (416 lb 3.2 oz)   16 (!) 181.4 kg (400 lb)              Today, you had the following     No orders found for display         Today's Medication Changes          These changes are accurate as of: 3/6/17  6:38 PM.  If you have any questions, ask your nurse or doctor.               Start taking these medicines.        Dose/Directions    dextroamphetamine 10 MG tablet   Commonly known as:  DEXTROSTAT   Used for:  Attention deficit disorder   Started by:  Bret Robledo MD        Dose:  10 mg   Take 1 tablet (10 mg) by mouth 2 times daily   Quantity:  60 tablet   Refills:  0       lisdexamfetamine 70 MG capsule   Commonly known as:  VYVANSE   Used for:  Attention deficit disorder   Started by:  Bret Robledo MD        Dose:  140 mg   Take 2 capsules (140 mg) by mouth 2 times daily   Quantity:  120 capsule   Refills:  0            Where to get your medicines      Some of these will need a paper prescription and others can be bought over the counter.  Ask your nurse if you have questions.     Bring a paper prescription for each of these medications     dextroamphetamine 10 MG tablet    lisdexamfetamine 70 MG capsule                Primary Care Provider Office Phone # Fax #    Renetta Shafer [phone]       [facility] [address] [place] [address]         Thank you!     Thank you for choosing PSYCHIATRY CLINIC  for your care. Our goal is always to provide you with excellent care. Hearing back from our patients is one way we can continue to improve our services. Please take a few minutes to complete the written survey that you may receive in the mail after your visit with us. Thank you!             Your Updated Medication List - Protect others around you: Learn how to safely use, store and throw away your medicines at www.disposemymeds.org.          This list is accurate as of: 3/6/17  6:38 PM.  Always use your most recent med list.                   Brand Name Dispense Instructions for use    ARIPiprazole 10 MG tablet    ABILIFY    30 tablet    Take 1 tablet (10 mg) by mouth daily       buPROPion 150 MG 24 hr tablet    WELLBUTRIN XL    60 tablet    Take 2 tablets (300 mg) by mouth daily       dextroamphetamine 10 MG tablet    DEXTROSTAT    60 tablet    Take 1 tablet (10 mg) by mouth 2 times daily       FLUoxetine 40 MG capsule    PROzac    60 capsule    Take 2 capsules (80 mg) by mouth daily       lamoTRIgine 200 MG tablet    LaMICtal    60 tablet    Take 1 tablet (200 mg) by mouth 2 times daily       lisdexamfetamine 70 MG capsule    VYVANSE    120 capsule    Take 2 capsules (140 mg) by mouth 2 times daily       * LOSARTAN POTASSIUM PO      Take 25 mg by mouth daily       * losartan 25 MG tablet    COZAAR    1 tablet    Take 1 tablet (25 mg) by mouth daily       OMEPRAZOLE PO      Take 40 mg by mouth every morning       omeprazole-sodium bicarbonate  MG per capsule    ZEGERID     1 capsule       ZANTAC PO      Take 300 mg by mouth At Bedtime       * Notice:  This list has 2 medication(s) that are the same as other medications prescribed for you. Read the directions carefully, and ask your doctor or other care provider to review them with you.

## 2017-03-06 NOTE — MR AVS SNAPSHOT
After Visit Summary   3/6/2017    Lexus Uribe    MRN: [id number]           Patient Information     Date Of Birth          1990        Visit Information        Provider Department      3/6/2017 2:30 PM Yuni Pond, PhD  Psychiatry Clinic        Today's Diagnoses     Moderate episode of recurrent major depressive disorder (H)    -  1    Binge eating disorder        Trichotillomania           Follow-ups after your visit        Your next 10 appointments already scheduled     Mar 23, 2017 12:00 PM CDT   Adult Psychotherapy with Yuni Pond, PhD    Psychiatry Clinic ([facility])    [address]  [address] [facility] [id number] [phone]            May 05, 2017  1:30 PM CDT   Adult Med Follow UP with Bret Robledo MD   Psychiatry Clinic ([facility])    [address] [facility] [id number] [phone]              Who to contact     Please call your clinic at [phone] to:    Ask questions about your health    Make or cancel appointments    Discuss your medicines    Learn about your test results    Speak to your doctor   If you have compliments or concerns about an experience at your clinic, or if you wish to file a complaint, please contact [facility] Patient Relations at [phone] or email us at [email or web address]         Additional Information About Your Visit        MyChart Information     Chatalog is an electronic gateway that provides easy, online access to your medical records. With Chatalog, you can request a clinic appointment, read your test results, renew a prescription or communicate with your care team.     To sign up for GRAYLt visit the website at [email or web address]   You will be asked to enter the access code listed below, as well as some personal information. Please follow the directions to create your  username and password.     Your access code is: [email or web address]  Expires: 2017  2:55 AM     Your access code will  in 90 days. If you need help or a new code, please contact your [facility] or call [phone] for assistance.        Care EveryWhere ID     This is your Care EveryWhere ID. This could be used by other organizations to access your [place] medical records  [id number]         Blood Pressure from Last 3 Encounters:   16 (!) 165/109   16 (!) 170/123   13 (!) 176/108    Weight from Last 3 Encounters:   16 (!) 191.1 kg (421 lb 3.2 oz)   16 (!) 188.8 kg (416 lb 3.2 oz)   16 (!) 181.4 kg (400 lb)              Today, you had the following     No orders found for display         Today's Medication Changes          These changes are accurate as of: 3/6/17 11:59 PM.  If you have any questions, ask your nurse or doctor.               Start taking these medicines.        Dose/Directions    dextroamphetamine 10 MG tablet   Commonly known as:  DEXTROSTAT   Used for:  Attention deficit disorder   Started by:  Bret Robledo MD        Dose:  10 mg   Take 1 tablet (10 mg) by mouth 2 times daily   Quantity:  60 tablet   Refills:  0       lisdexamfetamine 70 MG capsule   Commonly known as:  VYVANSE   Used for:  Attention deficit disorder   Started by:  Bret Robledo MD        Dose:  140 mg   Take 2 capsules (140 mg) by mouth 2 times daily   Quantity:  120 capsule   Refills:  0            Where to get your medicines      Some of these will need a paper prescription and others can be bought over the counter.  Ask your nurse if you have questions.     Bring a paper prescription for each of these medications     dextroamphetamine 10 MG tablet    lisdexamfetamine 70 MG capsule                Primary Care Provider Office Phone # Fax #    Renetta Shafer [phone]       [facility] [address] [place] [address]         Thank you!     Thank you for choosing PSYCHIATRY CLINIC  for your care. Our goal is always to provide you with excellent care. Hearing back from our patients is one way we can continue to improve our services. Please take a few minutes to complete the written survey that you may receive in the mail after your visit with us. Thank you!             Your Updated Medication List - Protect others around you: Learn how to safely use, store and throw away your medicines at www.disposemymeds.org.          This list is accurate as of: 3/6/17 11:59 PM.  Always use your most recent med list.                   Brand Name Dispense Instructions for use    ARIPiprazole 10 MG tablet    ABILIFY    30 tablet    Take 1 tablet (10 mg) by mouth daily       dextroamphetamine 10 MG tablet    DEXTROSTAT    60 tablet    Take 1 tablet (10 mg) by mouth 2 times daily       FLUoxetine 40 MG capsule    PROzac    60 capsule    Take 2 capsules (80 mg) by mouth daily       lamoTRIgine 200 MG tablet    LaMICtal    60 tablet    Take 1 tablet (200 mg) by mouth 2 times daily       lisdexamfetamine 70 MG capsule    VYVANSE    120 capsule    Take 2 capsules (140 mg) by mouth 2 times daily       * LOSARTAN POTASSIUM PO      Take 25 mg by mouth daily       * losartan 25 MG tablet    COZAAR    1 tablet    Take 1 tablet (25 mg) by mouth daily       OMEPRAZOLE PO      Take 40 mg by mouth every morning       omeprazole-sodium bicarbonate  MG per capsule    ZEGERID     1 capsule       ZANTAC PO      Take 300 mg by mouth At Bedtime       * Notice:  This list has 2 medication(s) that are the same as other medications prescribed for you. Read the directions carefully, and ask your doctor or other care provider to review them with you.

## 2017-03-06 NOTE — MR AVS SNAPSHOT
After Visit Summary   3/6/2017    Lexus Uribe    MRN: [id number]           Patient Information     Date Of Birth          1990        Visit Information        Provider Department      3/6/2017 3:00 PM Bret Robledo MD Psychiatry Clinic        Today's Diagnoses     Attention deficit disorder           Follow-ups after your visit        Your next 10 appointments already scheduled     Mar 23, 2017 12:00 PM CDT   Adult Psychotherapy with Yuni Pond, PhD    Psychiatry Clinic ([facility])    [place] [id number] [address] [facility] [phone] [phone]            May 05, 2017  1:30 PM CDT   Adult Med Follow UP with Bret Robledo MD   Psychiatry Clinic ([facility])    [place] [id number] [address] [facility] [address] [phone]              Who to contact     Please call your clinic at [phone] to:    Ask questions about your health    Make or cancel appointments    Discuss your medicines    Learn about your test results    Speak to your doctor   If you have compliments or concerns about an experience at your clinic, or if you wish to file a complaint, please contact [facility] Patient Relations at [phone] or email us at [email or web address]         Additional Information About Your Visit        MyChart Information     Knip is an electronic gateway that provides easy, online access to your medical records. With Knip, you can request a clinic appointment, read your test results, renew a prescription or communicate with your care team.     To sign up for Leversenset visit the website at [email or web address]   You will be asked to enter the access code listed below, as well as some personal information. Please follow the directions to create your username and password.     Your access code is: [email or web address]  Expires: 5/14/2017  1:55 AM     Your  access code will  in 90 days. If you need help or a new code, please contact your [facility] or call [phone] for assistance.        Care EveryWhere ID     This is your Care EveryWhere ID. This could be used by other organizations to access your [place] medical records  [id number]         Blood Pressure from Last 3 Encounters:   16 (!) 165/109   16 (!) 170/123   13 (!) 176/108    Weight from Last 3 Encounters:   16 (!) 191.1 kg (421 lb 3.2 oz)   16 (!) 188.8 kg (416 lb 3.2 oz)   16 (!) 181.4 kg (400 lb)              Today, you had the following     No orders found for display         Today's Medication Changes          These changes are accurate as of: 3/6/17 11:59 PM.  If you have any questions, ask your nurse or doctor.               Start taking these medicines.        Dose/Directions    dextroamphetamine 10 MG tablet   Commonly known as:  DEXTROSTAT   Used for:  Attention deficit disorder   Started by:  Bret Robledo MD        Dose:  10 mg   Take 1 tablet (10 mg) by mouth 2 times daily   Quantity:  60 tablet   Refills:  0       lisdexamfetamine 70 MG capsule   Commonly known as:  VYVANSE   Used for:  Attention deficit disorder   Started by:  Bret Robledo MD        Dose:  140 mg   Take 2 capsules (140 mg) by mouth 2 times daily   Quantity:  120 capsule   Refills:  0            Where to get your medicines      Some of these will need a paper prescription and others can be bought over the counter.  Ask your nurse if you have questions.     Bring a paper prescription for each of these medications     dextroamphetamine 10 MG tablet    lisdexamfetamine 70 MG capsule                Primary Care Provider Office Phone # Fax #    Renetta Shafer [phone]       [facility] [address] [address]        Thank you!     Thank you for choosing PSYCHIATRY CLINIC  for your care. Our goal is always to  provide you with excellent care. Hearing back from our patients is one way we can continue to improve our services. Please take a few minutes to complete the written survey that you may receive in the mail after your visit with us. Thank you!             Your Updated Medication List - Protect others around you: Learn how to safely use, store and throw away your medicines at www.disposemymeds.org.          This list is accurate as of: 3/6/17 11:59 PM.  Always use your most recent med list.                   Brand Name Dispense Instructions for use    ARIPiprazole 10 MG tablet    ABILIFY    30 tablet    Take 1 tablet (10 mg) by mouth daily       buPROPion 150 MG 24 hr tablet    WELLBUTRIN XL    60 tablet    Take 2 tablets (300 mg) by mouth daily       dextroamphetamine 10 MG tablet    DEXTROSTAT    60 tablet    Take 1 tablet (10 mg) by mouth 2 times daily       FLUoxetine 40 MG capsule    PROzac    60 capsule    Take 2 capsules (80 mg) by mouth daily       lamoTRIgine 200 MG tablet    LaMICtal    60 tablet    Take 1 tablet (200 mg) by mouth 2 times daily       lisdexamfetamine 70 MG capsule    VYVANSE    120 capsule    Take 2 capsules (140 mg) by mouth 2 times daily       * LOSARTAN POTASSIUM PO      Take 25 mg by mouth daily       * losartan 25 MG tablet    COZAAR    1 tablet    Take 1 tablet (25 mg) by mouth daily       OMEPRAZOLE PO      Take 40 mg by mouth every morning       omeprazole-sodium bicarbonate  MG per capsule    ZEGERID     1 capsule       ZANTAC PO      Take 300 mg by mouth At Bedtime       * Notice:  This list has 2 medication(s) that are the same as other medications prescribed for you. Read the directions carefully, and ask your doctor or other care provider to review them with you.

## 2017-03-07 ENCOUNTER — OFFICE VISIT (OUTPATIENT)
Dept: PSYCHIATRY | Facility: CLINIC | Age: 27
End: 2017-03-07
Attending: PSYCHOLOGIST
Payer: COMMERCIAL

## 2017-03-07 DIAGNOSIS — F98.8 ATTENTION DEFICIT DISORDER: ICD-10-CM

## 2017-03-07 DIAGNOSIS — F50.819 BINGE EATING DISORDER: ICD-10-CM

## 2017-03-07 DIAGNOSIS — F33.1 MODERATE EPISODE OF RECURRENT MAJOR DEPRESSIVE DISORDER (H): Primary | ICD-10-CM

## 2017-03-07 DIAGNOSIS — F63.3 TRICHOTILLOMANIA: ICD-10-CM

## 2017-03-07 NOTE — PROGRESS NOTES
DBT Skills Group       Group Time:  2 hours    DBT (Dialectic Behavior Therapy) is a cognitive behavioral therapy model that uses modeling, behavior rehearsal and guided participation to teach new skills and offer the patient and their family the opportunity to practice new behaviors.    Client: Lexus Uribe  Date: March 6, 2017  Number of Participants: 3    Diagnostic Criteria for group participation: History of suicidal ideation or self-injurious behavior in the past six months.    Diagnosis: (F32.9) Major depressive disorder with single episode, remission status unspecified  (primary encounter diagnosis)    On time? No,    How Late? 15 minutes    Group Topic for Today:Coninued Interpersonal Module: reviewed Interpersonal Effectiveness: Getting to know and Get people to like Us. Role-plays around MITZY and GIVE.     Diary Card? YES,     Response to Intervention Client used: came late and did not go over diary card. She met with Presley staton  Subjective Observation: Ms. Uribe was late for the group, but reviewed her diary card and her homework from last week. She said she used the Interpersonal skills in talking with her sister about bathing her cat. She asked few questions about the new material covered today, but said she understood what to do for the homework.    Progress towards TX Goals: Minimal    Objective Observation: Attentive   , Active Participant, Cooperative

## 2017-03-07 NOTE — MR AVS SNAPSHOT
After Visit Summary   3/7/2017    Lexus Uribe    MRN: [id number]           Patient Information     Date Of Birth          1990        Visit Information        Provider Department      3/7/2017 4:00 PM Yuni Pond, PhD  Psychiatry Clinic        Today's Diagnoses     Moderate episode of recurrent major depressive disorder (H)    -  1    Binge eating disorder        Attention deficit disorder        Trichotillomania           Follow-ups after your visit        Your next 10 appointments already scheduled     Mar 23, 2017 12:00 PM CDT   Adult Psychotherapy with Yuni Pond, PhD    Psychiatry Clinic ([facility])    [address]  [address] [facility] [id number] [phone]            May 05, 2017  1:30 PM CDT   Adult Med Follow UP with Bret Robledo MD   Psychiatry Clinic ([facility])    [address] [facility] [id number] [phone]              Who to contact     Please call your clinic at [phone] to:    Ask questions about your health    Make or cancel appointments    Discuss your medicines    Learn about your test results    Speak to your doctor   If you have compliments or concerns about an experience at your clinic, or if you wish to file a complaint, please contact [facility] Patient Relations at [phone] or email us at [email or web address]         Additional Information About Your Visit        MyChart Information     User Replayt is an electronic gateway that provides easy, online access to your medical records. With [email or web address], you can request a clinic appointment, read your test results, renew a prescription or communicate with your care team.     To sign up for User Replayt visit the website at [email or web address]   You will be asked to enter the access code listed below, as well as some personal information. Please  follow the directions to create your username and password.     Your access code is: [email or web address]  Expires: 2017  2:55 AM     Your access code will  in 90 days. If you need help or a new code, please contact your [facility] or call [phone] for assistance.        Care EveryWhere ID     This is your Care EveryWhere ID. This could be used by other organizations to access your [place] medical records  [id number]         Blood Pressure from Last 3 Encounters:   16 (!) 165/109   16 (!) 170/123   13 (!) 176/108    Weight from Last 3 Encounters:   16 (!) 191.1 kg (421 lb 3.2 oz)   16 (!) 188.8 kg (416 lb 3.2 oz)   16 (!) 181.4 kg (400 lb)              Today, you had the following     No orders found for display       Primary Care Provider Office Phone # Fax #    Renetta Shafer [phone]       [facility] [address] [facility] [address]        Thank you!     Thank you for choosing PSYCHIATRY CLINIC  for your care. Our goal is always to provide you with excellent care. Hearing back from our patients is one way we can continue to improve our services. Please take a few minutes to complete the written survey that you may receive in the mail after your visit with us. Thank you!             Your Updated Medication List - Protect others around you: Learn how to safely use, store and throw away your medicines at www.disposemymeds.org.          This list is accurate as of: 3/7/17 11:59 PM.  Always use your most recent med list.                   Brand Name Dispense Instructions for use    ARIPiprazole 10 MG tablet    ABILIFY    30 tablet    Take 1 tablet (10 mg) by mouth daily       dextroamphetamine 10 MG tablet    DEXTROSTAT    60 tablet    Take 1 tablet (10 mg) by mouth 2 times daily       FLUoxetine 40 MG capsule    PROzac    60 capsule    Take 2 capsules (80 mg) by mouth daily       lamoTRIgine 200 MG tablet     LaMICtal    60 tablet    Take 1 tablet (200 mg) by mouth 2 times daily       lisdexamfetamine 70 MG capsule    VYVANSE    120 capsule    Take 2 capsules (140 mg) by mouth 2 times daily       * LOSARTAN POTASSIUM PO      Take 25 mg by mouth daily       * losartan 25 MG tablet    COZAAR    1 tablet    Take 1 tablet (25 mg) by mouth daily       OMEPRAZOLE PO      Take 40 mg by mouth every morning       omeprazole-sodium bicarbonate  MG per capsule    ZEGERID     1 capsule       ZANTAC PO      Take 300 mg by mouth At Bedtime       * Notice:  This list has 2 medication(s) that are the same as other medications prescribed for you. Read the directions carefully, and ask your doctor or other care provider to review them with you.

## 2017-03-10 NOTE — PROGRESS NOTES
"Regarding meds, things are about as good as one could expect.  She has been getting to school.  She's on spring break now.  Sister has been over helping clean and keeping Erick from isolating.  No side effects.  Mood is \"OK\".  Sleeps 12 hours but doesn't keep regular hours.  Appetite is normal.  Concentration is \"not great\".  Could use more energy and motivation.   She has the ability to experience pleasure.  No homicidal ideation.  Has passive thoughts of dying.  No flashbacks.  No auditory or visual hallucinations.  No paranoia or ideas of reference.  No anxiety.  No panic attacks.  No OCD.  No alcohol or substance use.      MENTAL STATUS EXAMINATION:  Erick was cooperative and had good grooming.  Affect was appropriate.  Movements were normal.  No psychosis.  No homicidal ideation.  Recent recall is OK.  Good eye contact.  Mood is \"OK\".  Speech rate was normal.  Thought process was logical.  Passive suicidal ideation.  Orientation x 4.      CURRENT MEDICATIONS:   1.  Vyvanse 140 mg BID   2. Lamictal 200 mg BID   3. Prozac 80 mg   4. Abilify 10 mg   5. Wellbutrin  mg   6. Losartan   7. Omeprazole   8. Dextrostat 10 mg      ASSESSMENT:  MDD, recurrent and severe without psychosis.  Relatively stable from a pharmacologic standpoint.      PLAN:  No med changes today.  Follow up in 6 weeks.      This was a 30 minute face-to-face encounter of which >50% of the time was spent on providing support to increase social activity.         MARVEL ZAVALA MD             D: 03/10/2017 10:12   T: 03/10/2017 10:27   MT: IJEOMA      Name:     ERICK ORELLANA   MRN:      [id number]        Account:      [id number]   :      1990           Service Date: 2017      Document: [id number]    "

## 2017-03-11 NOTE — PROGRESS NOTES
"DBT Progress Note    Date: February 23, 2017  On time? YES,      How Late?   minutes  Client: Lexus Uribe  Appointment Duration: 55 minutes    Dx:  MDD (F32.9), BED (F50.81)    Diary Card? No,Lexus did not have a comletedt have it with her but shared more honestl.  Pal a d\" Never took the DBT manual inside. Did 2 days--Monday and Tues only.  Spoken to client since last session? No,       Since last session, did the client engage in any of the following behaviors? (If yes, brief description)    Quality-of-life-interfering behavior:  YES, missed class again but better than week before. Low motivation for getting coffee--maybe we should be in charge of this.  Primary targets this session:  Quality of life interfering behavior:  Eating disorder behavior--the need for more energy--physical.    Secondary targets this session:  Self-invalidation    Skills, Self-management    Strategies used in this session:    Dialectical strategies  Behavioral analysis  Commitment strategies  Irreverent communication    Notes/Assignments: Lexus Came with a partially completed diary card stating that  \"I don't want to have to do one of those Behavioral chains.\"  We discussed how nice it was for her to be adherent and complete some of her diary. We discussed motivation and what her goals are. Can/will she be able to do DBT if she doesn't increase her motivation. We discussed the pattern of Mom's being controlling and how I did not want to be like that. She is concerned regarding her inability to walk very far and how this limits her professional and persoanl life.      Working on the therapeutic alliance and trust..    Suicide assessment completed? YES,  No SI             "

## 2017-03-13 ENCOUNTER — OFFICE VISIT (OUTPATIENT)
Dept: PSYCHIATRY | Facility: CLINIC | Age: 27
End: 2017-03-13
Attending: PSYCHOLOGIST
Payer: COMMERCIAL

## 2017-03-13 DIAGNOSIS — F33.2 SEVERE EPISODE OF RECURRENT MAJOR DEPRESSIVE DISORDER, WITHOUT PSYCHOTIC FEATURES (H): ICD-10-CM

## 2017-03-13 DIAGNOSIS — F32.9 MAJOR DEPRESSIVE DISORDER WITH SINGLE EPISODE, REMISSION STATUS UNSPECIFIED: Primary | ICD-10-CM

## 2017-03-13 RX ORDER — BUPROPION HYDROCHLORIDE 150 MG/1
300 TABLET ORAL DAILY
Qty: 60 TABLET | Refills: 1 | Status: SHIPPED | OUTPATIENT
Start: 2017-03-13 | End: 2017-06-09

## 2017-03-13 NOTE — MR AVS SNAPSHOT
After Visit Summary   3/13/2017    Lexus Uribe    MRN: [id number]           Patient Information     Date Of Birth          1990        Visit Information        Provider Department      3/13/2017 2:30 PM [facility]        Today's Diagnoses     Major depressive disorder with single episode, remission status unspecified    -  1       Follow-ups after your visit        Your next 10 appointments already scheduled     Mar 23, 2017 12:00 PM CDT   Adult Psychotherapy with Yuni Pond, PhD LP   Psychiatry Clinic ([facility])    [address] [facility] [id number] [phone]            May 05, 2017  1:30 PM CDT   Adult Med Follow UP with Bret Robledo MD   Psychiatry Clinic ([facility])    [address] [facility] [phone]              Who to contact     Please call your clinic at [phone] to:    Ask questions about your health    Make or cancel appointments    Discuss your medicines    Learn about your test results    Speak to your doctor   If you have compliments or concerns about an experience at your clinic, or if you wish to file a complaint, please contact [facility] Patient Relations at [phone] or email us at [email or web address]         Additional Information About Your Visit        MyChart Information     XATAt is an electronic gateway that provides easy, online access to your medical records. With Design Within Reach, you can request a clinic appointment, read your test results, renew a prescription or communicate with your care team.     To sign up for XATAt visit the website at [email or web address]   You will be asked to enter the access code listed below, as well as some personal information. Please follow the directions to create your username and password.     Your access code  is: [email or web address]  Expires: 2017  2:55 AM     Your access code will  in 90 days. If you need help or a new code, please contact your [facility] or call [phone] for assistance.        Care EveryWhere ID     This is your Care EveryWhere ID. This could be used by other organizations to access your [place] medical records  [id number]         Blood Pressure from Last 3 Encounters:   16 (!) 165/109   16 (!) 170/123   13 (!) 176/108    Weight from Last 3 Encounters:   16 (!) 191.1 kg (421 lb 3.2 oz)   16 (!) 188.8 kg (416 lb 3.2 oz)   16 (!) 181.4 kg (400 lb)              Today, you had the following     No orders found for display         Where to get your medicines      These medications were sent to [facility]/pharmacy #[id number] [address] [address]     Phone:  [phone]     buPROPion 150 MG 24 hr tablet          Primary Care Provider Office Phone # Fax #    Renetta Shafer [phone]       JENNIFER [address] [place] [facility] [address]        Thank you!     Thank you for choosing PSYCHIATRY CLINIC  for your care. Our goal is always to provide you with excellent care. Hearing back from our patients is one way we can continue to improve our services. Please take a few minutes to complete the written survey that you may receive in the mail after your visit with us. Thank you!             Your Updated Medication List - Protect others around you: Learn how to safely use, store and throw away your medicines at www.disposemymeds.org.          This list is accurate as of: 3/13/17 11:59 PM.  Always use your most recent med list.                   Brand Name Dispense Instructions for use    ARIPiprazole 10 MG tablet    ABILIFY    30 tablet    Take 1 tablet (10 mg) by mouth daily       buPROPion 150 MG 24 hr tablet    WELLBUTRIN XL    60 tablet    Take 2 tablets (300  mg) by mouth daily       dextroamphetamine 10 MG tablet    DEXTROSTAT    60 tablet    Take 1 tablet (10 mg) by mouth 2 times daily       FLUoxetine 40 MG capsule    PROzac    60 capsule    Take 2 capsules (80 mg) by mouth daily       lamoTRIgine 200 MG tablet    LaMICtal    60 tablet    Take 1 tablet (200 mg) by mouth 2 times daily       lisdexamfetamine 70 MG capsule    VYVANSE    120 capsule    Take 2 capsules (140 mg) by mouth 2 times daily       * LOSARTAN POTASSIUM PO      Take 25 mg by mouth daily       * losartan 25 MG tablet    COZAAR    1 tablet    Take 1 tablet (25 mg) by mouth daily       OMEPRAZOLE PO      Take 40 mg by mouth every morning       omeprazole-sodium bicarbonate  MG per capsule    ZEGERID     1 capsule       ZANTAC PO      Take 300 mg by mouth At Bedtime       * Notice:  This list has 2 medication(s) that are the same as other medications prescribed for you. Read the directions carefully, and ask your doctor or other care provider to review them with you.

## 2017-03-13 NOTE — TELEPHONE ENCOUNTER
Medication Requested: Bupropion  mg tabs  Last Written RX Date: 12-5-16  Last Pharmacy Fill Date: 1-30-17  Last RX Quantity: 60,   # refills: 2    Last Office Visit: 3-6-17  Recommended RTC: 6 wks  Next Scheduled Office Visit: 5-5-17    Since last Visit: # of CX 0 ,# of NOS 0    Last Visit Recommendations for:  Medications: no change  Labs: 0    Will refill 30 day supply per protocol with one additional refill.      Kathleen M Doege RN

## 2017-03-15 NOTE — PROGRESS NOTES
DBT Skills Group       Group Time:  2 hours--    DBT (Dialectic Behavior Therapy) is a cognitive behavioral therapy model that uses modeling, behavior rehearsal and guided participation to teach new skills and offer the patient and their family the opportunity to practice new behaviors.    Client: Lexus Uribe  Date:March 6, 2017  Number of Participants:4    Diagnostic Criteria for group participation: History of suicidal ideation or self-injurious behavior in the past six months.    Diagnosis: Depression, Mild          Binge-Eating Disorder (F50.81)      On time? YES,     How Late? 0 min    Group Topic for Today:Coninued Interpersonal Module: reviewed Interpersonal Effectiveness: Getting to know and Get people to like Us. Role-plays around MITZY and GIVE    Diary Card? YES,      Response to Intervention Client used:  Wise Mind, Nonjudgmental stance: self/others, self soothe.    Subjective Observation: Lexus made it to group from her [facility] class on time--she came on time (did she go to class then??)  She completed her diary card and homework. Difficulties coming up with a MITZY reportedly due to her social isolation.      Progress towards TX Goals: Minimal    Objective Observation: Attentive   , Active Participant, Cooperative

## 2017-03-15 NOTE — PROGRESS NOTES
DBT Skills Group       Group Time:  2 hours    DBT (Dialectic Behavior Therapy) is a cognitive behavioral therapy model that uses modeling, behavior rehearsal and guided participation to teach new skills and offer the patient and their family the opportunity to practice new behaviors.    Client: Lexus Uribe  Date: March 13, 2017  Number of Participants: 4    Diagnostic Criteria for group participation: History of suicidal ideation or self-injurious behavior in the past six months.    Diagnosis: (F32.9) Major depressive disorder with single episode, remission status unspecified  (primary encounter diagnosis)    On time? YES,    How Late?  minutes    Group Topic for Today: We finished the Interpersonal Effectiveness module, with a review of the last homework. We began the Core Mindfulness module, reviewing the States of Mind and the What skills.      Diary Card? YES,     Response to Intervention Client used:  Wise Mind, Accumulate positives (build positive experiences), Build Mastery, [place] ahead of time, Wise mind distracts with ACCEPTS, Self-Soothe    Subjective Observation: Ms. Love was on time for group, and said this was the first time, since she is usually coming from a class. She had done the homework and shared her example in group.     Progress towards TX Goals: Moderate    Objective Observation: Attentive   , Active Participant, Cooperative

## 2017-03-20 NOTE — PROGRESS NOTES
DBT Progress Note    Date:  March 7, 2017  On time? YES,      How Late?   minutes  Client: Lexus Uribe  Appointment Duration: 55 minutes    Dx:  MDD (F32.9), BED (F50.81)    Diary Card? No,Lexus did not have a complete diary card--forgot it in her car.  Spoken to client since last session? No,       Since last session, did the client engage in any of the following behaviors? (If yes, brief description)    Quality-of-life-interfering behavior:  YES, missed class again and social isolation.. Low motivation  Primary targets this session:  Quality of life interfering behavior:  Eating disorder behavior and needing to follow through with Physical therapy appt.--Lexus  needs for more energy and to start moving around more but does not think she can. Not sitting in the chair at the table again--laying in bed when home. .    Secondary targets this session:  Self-invalidation    Skills, Self-management    Strategies used in this session:    Dialectical strategies  Behavioral analysis  Commitment strategies  Irreverent communication    Notes/Assignments: Lexus came without a  diary card stating that she had it in the car. She agreed that if she comes next time without she will need to go back and get it. Discussed therapy interfering behaviors and her hx of dropping out of treatment, classes and not having consequences.  Can/will she be able to do DBT if she doesn't increase her motivation? We discussed the pattern of Mom's being controlling and how I did not want to be like that. She is concerned regarding her inability to walk very far and how this limits her professional and persoanl life. Have mom come in for next session.     Working on the therapeutic alliance and trust..    Suicide assessment completed? YES,  No SI

## 2017-05-05 ENCOUNTER — OFFICE VISIT (OUTPATIENT)
Dept: PSYCHIATRY | Facility: CLINIC | Age: 27
End: 2017-05-05
Attending: PSYCHIATRY & NEUROLOGY
Payer: COMMERCIAL

## 2017-05-05 DIAGNOSIS — F98.8 ATTENTION DEFICIT DISORDER: ICD-10-CM

## 2017-05-05 RX ORDER — LISDEXAMFETAMINE DIMESYLATE 70 MG/1
140 CAPSULE ORAL 2 TIMES DAILY
Qty: 120 CAPSULE | Refills: 0 | Status: SHIPPED | OUTPATIENT
Start: 2017-05-05 | End: 2017-06-23

## 2017-05-05 RX ORDER — DEXTROAMPHETAMINE SULFATE 10 MG/1
10 TABLET ORAL
Qty: 60 TABLET | Refills: 0 | Status: SHIPPED | OUTPATIENT
Start: 2017-05-05 | End: 2017-07-31

## 2017-05-05 NOTE — MR AVS SNAPSHOT
After Visit Summary   2017    Lexus Uribe    MRN: [id number]           Patient Information     Date Of Birth          1990        Visit Information        Provider Department      2017 1:30 PM Bret Robledo MD Psychiatry Clinic        Today's Diagnoses     Attention deficit disorder           Follow-ups after your visit        Your next 10 appointments already scheduled     2017  1:00 PM CDT   Adult Med Follow UP with Bret Robledo MD   Psychiatry Clinic ([facility])    [address]  [address] [facility] [id number] [phone]              Who to contact     Please call your clinic at [phone] to:    Ask questions about your health    Make or cancel appointments    Discuss your medicines    Learn about your test results    Speak to your doctor   If you have compliments or concerns about an experience at your clinic, or if you wish to file a complaint, please contact [facility] Patient Relations at [phone] or email us at [email or web address]         Additional Information About Your Visit        MyChart Information     Triparazzi is an electronic gateway that provides easy, online access to your medical records. With Triparazzi, you can request a clinic appointment, read your test results, renew a prescription or communicate with your care team.     To sign up for Triparazzi visit the website at [email or web address]   You will be asked to enter the access code listed below, as well as some personal information. Please follow the directions to create your username and password.     Your access code is: [email or web address]  Expires: 2017  2:55 AM     Your access code will  in 90 days. If you need help or a new code, please contact your [facility] or call [phone] for assistance.        Care EveryWhere ID     This is your Care EveryWhere ID. This  could be used by other organizations to access your [place] medical records  [id number]         Blood Pressure from Last 3 Encounters:   04/18/16 (!) 165/109   03/28/16 (!) 170/123   09/21/13 (!) 176/108    Weight from Last 3 Encounters:   08/24/16 (!) 191.1 kg (421 lb 3.2 oz)   07/26/16 (!) 188.8 kg (416 lb 3.2 oz)   06/03/16 (!) 181.4 kg (400 lb)              Today, you had the following     No orders found for display         Where to get your medicines      Some of these will need a paper prescription and others can be bought over the counter.  Ask your nurse if you have questions.     Bring a paper prescription for each of these medications     dextroamphetamine 10 MG tablet    lisdexamfetamine 70 MG capsule          Primary Care Provider Office Phone # Fax #    Renetta KIRAN [facility]alcom [phone]       [address] [place] [facility] [address]        Thank you!     Thank you for choosing PSYCHIATRY CLINIC  for your care. Our goal is always to provide you with excellent care. Hearing back from our patients is one way we can continue to improve our services. Please take a few minutes to complete the written survey that you may receive in the mail after your visit with us. Thank you!             Your Updated Medication List - Protect others around you: Learn how to safely use, store and throw away your medicines at www.disposemymeds.org.          This list is accurate as of: 5/5/17 11:59 PM.  Always use your most recent med list.                   Brand Name Dispense Instructions for use    ARIPiprazole 10 MG tablet    ABILIFY    30 tablet    Take 1 tablet (10 mg) by mouth daily       buPROPion 150 MG 24 hr tablet    WELLBUTRIN XL    60 tablet    Take 2 tablets (300 mg) by mouth daily       dextroamphetamine 10 MG tablet    DEXTROSTAT    60 tablet    Take 1 tablet (10 mg) by mouth 2 times daily       FLUoxetine 40 MG capsule    PROzac    60 capsule    Take 2 capsules (80 mg) by mouth daily        lamoTRIgine 200 MG tablet    LaMICtal    60 tablet    Take 1 tablet (200 mg) by mouth 2 times daily       lisdexamfetamine 70 MG capsule    VYVANSE    120 capsule    Take 2 capsules (140 mg) by mouth 2 times daily       * LOSARTAN POTASSIUM PO      Take 25 mg by mouth daily       * losartan 25 MG tablet    COZAAR    1 tablet    Take 1 tablet (25 mg) by mouth daily       OMEPRAZOLE PO      Take 40 mg by mouth every morning       omeprazole-sodium bicarbonate  MG per capsule    ZEGERID     1 capsule       ZANTAC PO      Take 300 mg by mouth At Bedtime       * Notice:  This list has 2 medication(s) that are the same as other medications prescribed for you. Read the directions carefully, and ask your doctor or other care provider to review them with you.

## 2017-05-08 NOTE — PROGRESS NOTES
Erick has been working out 3x/week.  She's feeling good.   Will be taking 2 summer classes.  She's getting out more.  Mood is good.  Excess sleep.  Appetite not excess.  Concentration is better.  Good energy and better motivation.  She has the ability to experience pleasure.  No homicidal ideation.  Chronic passive thoughts of death.  No flashbacks.  No auditory or visual hallucinations.  No paranoia or ideas of reference.  Anxiety is less.  No panic attacks. No OCD.  Moderate alcohol use.  O substance use.      MENTAL STATUS EXAMINATION:  Erick is cooperative.  She has good grooming.  Affect is bright.  Movements are normal.  No psychosis.  No homicidal ideation.  Recent recall is good.  Good eye contact.  Mood is positive.  Speech rate is normal.  Thought process is logical.  No suicidal ideation.  Orientation x 4.      CURRENT MEDICATIONS:   1.  Vyvanse 140 mg bid   2. Wellburtin  mg   3. Lamictal 200 mg bid   4. Abilify 10 mg   5. Dextrostat 10 mg      ASSESSMENT:  ADD; MDD, recurrent stable at current time.      PLAN:  No medication changes.  Follow up in 2 months.      This was a 25 minute face-to-face encounter of which >50% of the time was spent on positive reinforcement for healthy behaviors.         MARVEL ZAVALA MD             D: 2017 14:40   T: 2017 14:56   MT: IJEOMA      Name:     ERICK ORELLANA   MRN:      [id number]        Account:      [id number]   :      1990           Service Date: 2017      Document: [id number]

## 2017-05-08 NOTE — PROGRESS NOTES
Mary's OCD symptoms have responded in part to Marinol.  Lorazepam is helping anxiety around rituals.  Mood is fairly positive.  Sleep is good.  Appetite is good.  Concentration is good.  Energy is better and motivation is improved.  She has the ability to experience pleasure.  No homicidal or suicidal ideation.  No flashbacks.  No auditory or visual hallucinations.  No paranoia or ideas of reference.  Anxiety related to OCD.  No panic attacks.  OCD is improving.  No alcohol or substance use.      MENTAL STATUS EXAMINATION:  Mary is cooperative.  She has good grooming.  Affect is smiling.  Movements are normal.  No psychosis.  No homicidal ideation.  Good recent recall.  Good eye contact.  Mood is positive.  Speech rate is normal.  Thought process is logical.  No suicidal ideation.  Orientation x 4.      CURRENT MEDICATIONS:   1.  Riluzole 50/100 mg   2. Ativan 1 mg tid   3. Marinol 2.5 mg bid   4. Prozac 80 mg      ASSESSMENT:  OCD significant but incomplete benefit with Marinol and increased lorazepam.      PLAN:  Increase Marinol to 5 mg bid.  No DBS programming today.      This was a 30 minute face-to-face encounter of which >50% of the time was spent on discussion of response to meds and future recommendations.         MARVEL ZAVALA MD             D: 2017 15:10   T: 2017 15:16   MT: IJEOMA      Name:     ERICK ORELLANA   MRN:      [id number]        Account:      [id number]   :      1990           Service Date: 2017      Document: [id number]

## 2017-05-09 ENCOUNTER — TELEPHONE (OUTPATIENT)
Dept: PSYCHIATRY | Facility: CLINIC | Age: 27
End: 2017-05-09

## 2017-05-09 DIAGNOSIS — F33.1 MODERATE EPISODE OF RECURRENT MAJOR DEPRESSIVE DISORDER (H): ICD-10-CM

## 2017-05-09 NOTE — TELEPHONE ENCOUNTER
Medication Requested: Fluoxetine 40 mg caps  Last Written RX Date: 2-13-17  Last Pharmacy Fill Date: 4-6-17  Last RX Quantity: 60,   # refills: 2    Last Office Visit: 5-5-17  Recommended RTC: note unsigned  Next Scheduled Office Visit: 7-31-17    Since last Visit: # of CX 0 ,# of NOS 0    Last Visit Recommendations for:  Medications: note unsigned  Labs: 0    Will route to provider due to note unsigned.      Kathleen M Doege RN

## 2017-05-10 RX ORDER — FLUOXETINE 40 MG/1
80 CAPSULE ORAL DAILY
Qty: 120 CAPSULE | Refills: 0 | Status: SHIPPED | OUTPATIENT
Start: 2017-05-10 | End: 2017-10-06

## 2017-05-24 ENCOUNTER — TELEPHONE (OUTPATIENT)
Dept: PSYCHIATRY | Facility: CLINIC | Age: 27
End: 2017-05-24

## 2017-05-24 DIAGNOSIS — F33.2 MAJOR DEPRESSIVE DISORDER, RECURRENT, SEVERE WITHOUT PSYCHOTIC FEATURES (H): ICD-10-CM

## 2017-05-24 RX ORDER — ARIPIPRAZOLE 10 MG/1
10 TABLET ORAL DAILY
Qty: 30 TABLET | Refills: 1 | Status: SHIPPED | OUTPATIENT
Start: 2017-05-24 | End: 2017-07-10

## 2017-05-24 NOTE — TELEPHONE ENCOUNTER
Order for 30 day supply sent to the pharmacy. With 1 additional refill.      Kathleen M Doege RN

## 2017-05-24 NOTE — TELEPHONE ENCOUNTER
"Medication Requested: abilify 10 mg tabs  Last Written RX Date: 1-6-17  Last Pharmacy Fill Date: 4-22-17  Last RX Quantity: 30,   # refills: 2    Last Office Visit: 5-5-17  Recommended RTC: not indicated  Next Scheduled Office Visit: 7-31-17    Since last Visit: # of CX 0 ,# of NOS 0    Last Visit Recommendations for:  Medications: not addressed in Plan and not on \"Current Medications listed in body of clinic note - although is on the EPIC medication list  Labs: 0    Clinic visit note is Unsigned - will route to provider.    Kathleen M Doege RN      "

## 2017-06-09 DIAGNOSIS — F33.2 SEVERE EPISODE OF RECURRENT MAJOR DEPRESSIVE DISORDER, WITHOUT PSYCHOTIC FEATURES (H): ICD-10-CM

## 2017-06-09 RX ORDER — BUPROPION HYDROCHLORIDE 150 MG/1
300 TABLET ORAL DAILY
Qty: 60 TABLET | Refills: 1 | Status: SHIPPED | OUTPATIENT
Start: 2017-06-09 | End: 2017-08-11

## 2017-06-09 NOTE — TELEPHONE ENCOUNTER
Medication Requested: Bupropion  mg tabs  Last Written RX Date: 3-13-17  Last Pharmacy Fill Date: 4-22-17  Last RX Quantity: 60,   # refills: 1    Last Office Visit: 5-5-17  Recommended RTC: not indicated  Next Scheduled Office Visit: 7-31-17    Since last Visit: # of CX 0 ,# of NOS 0    Last Visit Recommendations for:  Medications: no change indicated  Labs: 0    Will refill 30 day supply per protocol. With one additional refill.    Kathleen M Doege RN

## 2017-06-14 NOTE — TELEPHONE ENCOUNTER
Received RF request from [facility] pharmacy on [facility] for:    ARIPiprazole (ABILIFY) 10 MG tablet 30 tablet 1 5/24/2017  No   Sig: Take 1 tablet (10 mg) by mouth daily     Last RF:  4/22/17    Due for RF:  No, per med tab    Appointment History:  Last appt: 5/05/17  RTC: 2 months  Cancel: none  No-show: none  Next appt: 7/31/17    Called the pharmacy and spoke with Werner who confirmed that they did receive the script sent electronically on 5/24/17.

## 2017-06-23 DIAGNOSIS — F98.8 ATTENTION DEFICIT DISORDER: ICD-10-CM

## 2017-06-23 RX ORDER — LISDEXAMFETAMINE DIMESYLATE 70 MG/1
140 CAPSULE ORAL 2 TIMES DAILY
Qty: 120 CAPSULE | Refills: 0 | Status: SHIPPED | OUTPATIENT
Start: 2017-06-23 | End: 2017-07-27

## 2017-06-23 NOTE — TELEPHONE ENCOUNTER
Rec'd email below forwarded by Dr. Robledo, with request to print prescription following review of [place]-.    ---------- Forwarded message ----------  From: Lexus Uribe <[email or web address]>  Date: Thu, Jun 22, 2017 at 6:55 PM  Subject: Vyvanse Prescription  To: Bret Robledo MD [email or web address]      Hi Dr. Robledo,    I can't find my Vyvanse script, could you send me a new one?  I have the one for Dexedrine, just not Vyvanse.  140 mg/2x daily    My address is:  [address] [place] [address]  [address]  [place] [address]    Thank you!    Lexus Uribe    Per [place]-, 30 d/s Vyvanse 140 mg BID last filled: 5/22/17, 4/12/17, 3/9/17    Script printed and routed to provider for signature

## 2017-07-10 DIAGNOSIS — F33.2 MAJOR DEPRESSIVE DISORDER, RECURRENT, SEVERE WITHOUT PSYCHOTIC FEATURES (H): ICD-10-CM

## 2017-07-10 RX ORDER — ARIPIPRAZOLE 10 MG/1
10 TABLET ORAL DAILY
Qty: 30 TABLET | Refills: 0 | Status: SHIPPED | OUTPATIENT
Start: 2017-07-10 | End: 2017-08-29

## 2017-07-10 NOTE — TELEPHONE ENCOUNTER
Last seen: 5/5/17  RTC: 2 months  Cancel: none  No-show: none  Next appt:  7/31/17     Incoming refill from [facility] via fax     Medication requested: Abilify 10 mg tabs  Directions: 10 mg daily  Qty: 30  Last refilled: 6/20/17     Medication refill approved per refill protocol

## 2017-07-27 ENCOUNTER — TELEPHONE (OUTPATIENT)
Dept: PSYCHIATRY | Facility: CLINIC | Age: 27
End: 2017-07-27

## 2017-07-27 DIAGNOSIS — F98.8 ATTENTION DEFICIT DISORDER: Primary | ICD-10-CM

## 2017-07-27 RX ORDER — LISDEXAMFETAMINE DIMESYLATE 70 MG/1
140 CAPSULE ORAL 2 TIMES DAILY
Qty: 120 CAPSULE | Refills: 0 | Status: SHIPPED | OUTPATIENT
Start: 2017-07-27 | End: 2017-07-31

## 2017-07-27 NOTE — TELEPHONE ENCOUNTER
Rec'd email forwarded by Dr. Robledo, with request to print new Vyvanse Rx:    From: Lexus Uribe <[email or web address]>  Date: Tue, Jul 25, 2017 at 6:03 PM  Subject: Rx  To: Bret Robledo MD [email or web address]    Hi Dr. Robledo,     I have an appointment on the 31st, but I'm almost out of Vyvanse.  Could I have another prescription mailed to me?  If you gave me one a few months ago, I've misplaced it.    Lexus Uribe  [address]  [place] [address]    Thank you,    Lexus Uribe    Pend: 7/31/17    Per [place]-, 30 d/s Vyvanse 140 mg BID last filled: 6/26/17, 5/22/17    Script printed and routed to provider for signature

## 2017-07-31 ENCOUNTER — OFFICE VISIT (OUTPATIENT)
Dept: PSYCHIATRY | Facility: CLINIC | Age: 27
End: 2017-07-31
Attending: PSYCHIATRY & NEUROLOGY
Payer: COMMERCIAL

## 2017-07-31 DIAGNOSIS — F90.0 ATTENTION DEFICIT HYPERACTIVITY DISORDER (ADHD), PREDOMINANTLY INATTENTIVE TYPE: ICD-10-CM

## 2017-07-31 DIAGNOSIS — F33.2 SEVERE EPISODE OF RECURRENT MAJOR DEPRESSIVE DISORDER, WITHOUT PSYCHOTIC FEATURES (H): ICD-10-CM

## 2017-07-31 RX ORDER — DEXTROAMPHETAMINE SULFATE 10 MG/1
10 TABLET ORAL
Qty: 60 TABLET | Refills: 0 | Status: SHIPPED | OUTPATIENT
Start: 2017-07-31 | End: 2017-10-20

## 2017-07-31 RX ORDER — DEXTROAMPHETAMINE SULFATE 10 MG/1
10 TABLET ORAL
Qty: 60 TABLET | Refills: 0 | Status: SHIPPED | OUTPATIENT
Start: 2017-07-31 | End: 2017-07-31

## 2017-07-31 RX ORDER — LAMOTRIGINE 200 MG/1
400 TABLET ORAL DAILY
Qty: 1 TABLET | Refills: 0
Start: 2017-07-31 | End: 2018-03-23

## 2017-07-31 RX ORDER — LISDEXAMFETAMINE DIMESYLATE 70 MG/1
140 CAPSULE ORAL 2 TIMES DAILY
Qty: 120 CAPSULE | Refills: 0 | Status: SHIPPED | OUTPATIENT
Start: 2017-07-31 | End: 2017-07-31

## 2017-07-31 RX ORDER — LISDEXAMFETAMINE DIMESYLATE 70 MG/1
140 CAPSULE ORAL 2 TIMES DAILY
Qty: 120 CAPSULE | Refills: 0 | Status: SHIPPED | OUTPATIENT
Start: 2017-07-31 | End: 2017-10-20

## 2017-07-31 NOTE — MR AVS SNAPSHOT
After Visit Summary   2017    Lexus Uribe    MRN: [id number]           Patient Information     Date Of Birth          1990        Visit Information        Provider Department      2017 1:00 PM Bret Robledo MD Psychiatry Clinic        Today's Diagnoses     Attention deficit hyperactivity disorder (ADHD), predominantly inattentive type        Severe episode of recurrent major depressive disorder, without psychotic features (H)           Follow-ups after your visit        Who to contact     Please call your clinic at [phone] to:    Ask questions about your health    Make or cancel appointments    Discuss your medicines    Learn about your test results    Speak to your doctor   If you have compliments or concerns about an experience at your clinic, or if you wish to file a complaint, please contact [facility] Patient Relations at [phone] or email us at [email or web address]         Additional Information About Your Visit        MyChart Information     Macaw is an electronic gateway that provides easy, online access to your medical records. With Macaw, you can request a clinic appointment, read your test results, renew a prescription or communicate with your care team.     To sign up for Macaw visit the website at [email or web address]   You will be asked to enter the access code listed below, as well as some personal information. Please follow the directions to create your username and password.     Your access code is: [email or web address]  Expires: 2017  7:57 AM     Your access code will  in 90 days. If you need help or a new code, please contact your [facility] or call [phone] for assistance.        Care EveryWhere ID     This is your Care EveryWhere ID. This could be used by other organizations to access your [facility] medical records  [id number]         Blood Pressure from Last 3  Encounters:   04/18/16 (!) 165/109   03/28/16 (!) 170/123   09/21/13 (!) 176/108    Weight from Last 3 Encounters:   08/24/16 (!) 191.1 kg (421 lb 3.2 oz)   07/26/16 (!) 188.8 kg (416 lb 3.2 oz)   06/03/16 (!) 181.4 kg (400 lb)              Today, you had the following     No orders found for display         Today's Medication Changes          These changes are accurate as of: 7/31/17 11:59 PM.  If you have any questions, ask your nurse or doctor.               Start taking these medicines.        Dose/Directions    dextroamphetamine 10 MG tablet   Commonly known as:  DEXTROSTAT   Used for:  Attention deficit hyperactivity disorder (ADHD), predominantly inattentive type        Dose:  10 mg   Take 1 tablet (10 mg) by mouth 2 times daily   Quantity:  60 tablet   Refills:  0       lisdexamfetamine 70 MG capsule   Commonly known as:  VYVANSE   Used for:  Attention deficit hyperactivity disorder (ADHD), predominantly inattentive type        Dose:  140 mg   Take 2 capsules (140 mg) by mouth 2 times daily   Quantity:  120 capsule   Refills:  0         These medicines have changed or have updated prescriptions.        Dose/Directions    lamoTRIgine 200 MG tablet   Commonly known as:  LaMICtal   This may have changed:    - how much to take  - when to take this   Used for:  Severe episode of recurrent major depressive disorder, without psychotic features (H)        Dose:  400 mg   Take 2 tablets (400 mg) by mouth daily   Quantity:  1 tablet   Refills:  0            Where to get your medicines      Some of these will need a paper prescription and others can be bought over the counter.  Ask your nurse if you have questions.     Bring a paper prescription for each of these medications     dextroamphetamine 10 MG tablet    lisdexamfetamine 70 MG capsule       You don't need a prescription for these medications     lamoTRIgine 200 MG tablet                Primary Care Provider Office Phone # Fax #    Renetta Shafer [phone]  [phone]       [facility] [address]  [facility] [address]        Equal Access to Services     DANIA HECK : Hadii shane xiao catrachita Justice, wacarrieda luqmarv, melodyta kamikeda rashel, megan kierrain hayaabrina salamancamarcus thayer laJosechaparro whiting. So [facility] [phone].    ATENCIÓN: Si habla español, tiene a amador disposición servicios gratuitos de asistencia lingüística. Llame al [phone].    We comply with applicable federal civil rights laws and [place] laws. We do not discriminate on the basis of race, color, national origin, age, disability sex, sexual orientation or gender identity.            Thank you!     Thank you for choosing PSYCHIATRY CLINIC  for your care. Our goal is always to provide you with excellent care. Hearing back from our patients is one way we can continue to improve our services. Please take a few minutes to complete the written survey that you may receive in the mail after your visit with us. Thank you!             Your Updated Medication List - Protect others around you: Learn how to safely use, store and throw away your medicines at www.disposemymeds.org.          This list is accurate as of: 7/31/17 11:59 PM.  Always use your most recent med list.                   Brand Name Dispense Instructions for use Diagnosis    ARIPiprazole 10 MG tablet    ABILIFY    30 tablet    Take 1 tablet (10 mg) by mouth daily    Major depressive disorder, recurrent, severe without psychotic features (H)       dextroamphetamine 10 MG tablet    DEXTROSTAT    60 tablet    Take 1 tablet (10 mg) by mouth 2 times daily    Attention deficit hyperactivity disorder (ADHD), predominantly inattentive type       FLUoxetine 40 MG capsule    PROzac    120 capsule    Take 2 capsules (80 mg) by mouth daily    Moderate episode of recurrent major depressive disorder (H)       lamoTRIgine 200 MG tablet    LaMICtal    1 tablet    Take 2 tablets (400 mg) by mouth daily    Severe episode of recurrent major depressive disorder,  without psychotic features (H)       lisdexamfetamine 70 MG capsule    VYVANSE    120 capsule    Take 2 capsules (140 mg) by mouth 2 times daily    Attention deficit hyperactivity disorder (ADHD), predominantly inattentive type       * LOSARTAN POTASSIUM PO      Take 25 mg by mouth daily        * losartan 25 MG tablet    COZAAR    1 tablet    Take 1 tablet (25 mg) by mouth daily    Benign essential hypertension       OMEPRAZOLE PO      Take 40 mg by mouth every morning        omeprazole-sodium bicarbonate  MG per capsule    ZEGERID     1 capsule        ZANTAC PO      Take 300 mg by mouth At Bedtime        * Notice:  This list has 2 medication(s) that are the same as other medications prescribed for you. Read the directions carefully, and ask your doctor or other care provider to review them with you.

## 2017-08-11 DIAGNOSIS — F33.2 SEVERE EPISODE OF RECURRENT MAJOR DEPRESSIVE DISORDER, WITHOUT PSYCHOTIC FEATURES (H): ICD-10-CM

## 2017-08-11 RX ORDER — BUPROPION HYDROCHLORIDE 150 MG/1
300 TABLET ORAL DAILY
Qty: 60 TABLET | Refills: 1 | Status: SHIPPED | OUTPATIENT
Start: 2017-08-11 | End: 2017-10-13

## 2017-08-11 NOTE — TELEPHONE ENCOUNTER
Medication requested: Wellbutrin  Last refilled: 7/5/17  Qty: 60      Last seen: 7/31/17  RTC: chart note has not been done  Cancel: 0  No-show: 0  Next appt: no future naomi    Refill decision: Refill pended and routed to the provider for review/determination. EMR indicates appt 7/31/17 but chart note has not been completed.

## 2017-08-23 NOTE — PROGRESS NOTES
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 She goes to the gym four times per week.      She says her mood is \"pretty good.\" Working out helps. She sleeps a bit too much. She lost 15 pounds through dieting and exercise. Concentration is good. Energy is improved with exercise. She can experience pleasure. She is writing a lot and has some social activities. No homicidal or suicidal ideation. No flashbacks. No hallucinations, paranoia, or ideas of reference. Her anxiety level is normal. She has had one panic attack since her last appointment. She does pull her hair. No alcohol or substance use.      Lexus was cooperative with the appointment. Eye contact was good. Grooming was good. Mood is reactive and affect is congruent. Speech is normal. Movements are normal. Thought process is logical and goal-directed. No psychosis. No suicidal or homicidal ideation. Oriented x4. Recent recall was OK.       MEDICATIONS:   1. Dexedrine 10 mg BID   2. Wellbutrin 300 mg BID   3. Vyvanse 140 mg BID   4. Prozac 80 mg   5. Abilify 10 mg   6. Lamictal 40 mg QD      ASSESSMENT:    1. MDD, recurrent, in symptomatic remission   2. ADD      PLAN:   1. Continue same medications   2. Dose of Vyvanse is quite high, but past attempts to reduce it have resulted in worsening of ADD symptoms. Attention currently good enough for school.      This was a 30-minute face-to-face encounter, of which greater than 50% of the time was spent discussing caution over the high dose of Vyvanse she is on.   "

## 2017-08-29 DIAGNOSIS — F33.2 MAJOR DEPRESSIVE DISORDER, RECURRENT, SEVERE WITHOUT PSYCHOTIC FEATURES (H): ICD-10-CM

## 2017-08-29 RX ORDER — ARIPIPRAZOLE 10 MG/1
10 TABLET ORAL DAILY
Qty: 30 TABLET | Refills: 0 | Status: SHIPPED | OUTPATIENT
Start: 2017-08-29 | End: 2017-09-25

## 2017-08-29 NOTE — TELEPHONE ENCOUNTER
Medication requested: aripiprazole  Last refilled: 7/12/17  Qty: 30      Last seen: 7/31/17  RTC: not indicated, note incomplete  Cancel: 0  No-show: 0  Next appt: 0    Refill decision: Refill pended and routed to the provider for review/determination due to partial dictation/ incomplete note

## 2017-09-25 DIAGNOSIS — F33.2 MAJOR DEPRESSIVE DISORDER, RECURRENT, SEVERE WITHOUT PSYCHOTIC FEATURES (H): ICD-10-CM

## 2017-09-25 RX ORDER — ARIPIPRAZOLE 10 MG/1
10 TABLET ORAL DAILY
Qty: 14 TABLET | Refills: 0 | Status: SHIPPED | OUTPATIENT
Start: 2017-09-25 | End: 2017-10-06

## 2017-09-25 NOTE — TELEPHONE ENCOUNTER
Medication requested: Abilify 10  mg tabs  Last refilled: 8-29-17  Qty: 30      Last seen: 7-31-17  RTC: not indicated  Cancel: 0  No-show: 0  Next appt: none    Refill decision: 14 day kameron refill due to no scheduled appointment sent to the pharmacy - including instructions for patient to call the clinic and schedule an appointment, message is sent to the scheduling staff to call the patient to get the patient scheduled and an FYI is sent to the provider.      Kathleen M Doege RN

## 2017-09-26 NOTE — TELEPHONE ENCOUNTER
Burd, Holly B. Doege, Leonor ARREDONDO, RN                   Pt is scheduled on 1/18/18           Kathleen M Doege RN

## 2017-10-06 DIAGNOSIS — F33.2 SEVERE EPISODE OF RECURRENT MAJOR DEPRESSIVE DISORDER, WITHOUT PSYCHOTIC FEATURES (H): ICD-10-CM

## 2017-10-06 DIAGNOSIS — F33.1 MODERATE EPISODE OF RECURRENT MAJOR DEPRESSIVE DISORDER (H): ICD-10-CM

## 2017-10-06 DIAGNOSIS — F33.2 MAJOR DEPRESSIVE DISORDER, RECURRENT, SEVERE WITHOUT PSYCHOTIC FEATURES (H): ICD-10-CM

## 2017-10-06 RX ORDER — FLUOXETINE 40 MG/1
80 CAPSULE ORAL DAILY
Qty: 120 CAPSULE | Refills: 3 | Status: SHIPPED | OUTPATIENT
Start: 2017-10-06 | End: 2018-01-18

## 2017-10-06 RX ORDER — ARIPIPRAZOLE 10 MG/1
10 TABLET ORAL DAILY
Qty: 30 TABLET | Refills: 3 | Status: SHIPPED | OUTPATIENT
Start: 2017-10-06 | End: 2018-01-18

## 2017-10-06 NOTE — TELEPHONE ENCOUNTER
Last seen: 7/31/17  RTC: not specified  Cancel: none  No-show: none  Next appt: 1/18/18     Per last office note:  - Wellbutrin 300 mg BID   - Prozac 80 mg   - Abilify 10 mg     Last prescribed:  ARIPiprazole (ABILIFY) 10 MG tablet 14 tablet 0 9/25/2017  No   Sig: Take 1 tablet (10 mg) by mouth daily   Class: E-Prescribe     buPROPion (WELLBUTRIN XL) 150 MG 24 hr tablet 60 tablet 1 8/11/2017  No   Sig: Take 2 tablets (300 mg) by mouth daily   Class: E-Prescribe     FLUoxetine (PROZAC) 40 MG capsule 120 capsule 0 5/10/2017  No   Sig: Take 2 capsules (80 mg) by mouth daily   Class: E-Prescribe     Meds pended and routed to provider for authorization as Wellbutrin dose needs clarification

## 2017-10-06 NOTE — TELEPHONE ENCOUNTER
----- Message from Tori Dueñas sent at 10/6/2017 10:43 AM CDT -----  Regarding: refill requests  Contact: [phone]  Caller:  pt  Medication:  Aripiprazole, bupriprion, fluoxetine  Pharmacy and location:  [facility].  Have you contacted the pharmacy: yes  How much of medication do you have left:  none  Pending appt: January   Okay to leave VM:  yes

## 2017-10-13 RX ORDER — BUPROPION HYDROCHLORIDE 150 MG/1
300 TABLET ORAL DAILY
Qty: 60 TABLET | Refills: 3 | Status: SHIPPED | OUTPATIENT
Start: 2017-10-13 | End: 2018-01-18

## 2017-10-13 NOTE — TELEPHONE ENCOUNTER
Bret Robledo MD Bove, Michelle, RN        Caller: Unspecified (1 week ago, 12:49 PM)                     150 BID

## 2017-10-20 ENCOUNTER — TELEPHONE (OUTPATIENT)
Dept: PSYCHIATRY | Facility: CLINIC | Age: 27
End: 2017-10-20

## 2017-10-20 DIAGNOSIS — F90.0 ATTENTION DEFICIT HYPERACTIVITY DISORDER (ADHD), PREDOMINANTLY INATTENTIVE TYPE: ICD-10-CM

## 2017-10-20 RX ORDER — DEXTROAMPHETAMINE SULFATE 10 MG/1
10 TABLET ORAL
Qty: 60 TABLET | Refills: 0 | Status: SHIPPED | OUTPATIENT
Start: 2017-11-20 | End: 2017-10-20

## 2017-10-20 RX ORDER — LISDEXAMFETAMINE DIMESYLATE 70 MG/1
140 CAPSULE ORAL 2 TIMES DAILY
Qty: 120 CAPSULE | Refills: 0 | Status: SHIPPED | OUTPATIENT
Start: 2017-10-20 | End: 2017-10-20

## 2017-10-20 RX ORDER — LISDEXAMFETAMINE DIMESYLATE 70 MG/1
140 CAPSULE ORAL 2 TIMES DAILY
Qty: 120 CAPSULE | Refills: 0 | Status: SHIPPED | OUTPATIENT
Start: 2017-11-20 | End: 2017-10-20

## 2017-10-20 RX ORDER — DEXTROAMPHETAMINE SULFATE 10 MG/1
10 TABLET ORAL
Qty: 60 TABLET | Refills: 0 | Status: SHIPPED | OUTPATIENT
Start: 2017-10-20 | End: 2017-10-20

## 2017-10-20 RX ORDER — DEXTROAMPHETAMINE SULFATE 10 MG/1
10 TABLET ORAL
Qty: 60 TABLET | Refills: 0 | Status: SHIPPED | OUTPATIENT
Start: 2017-12-20 | End: 2017-10-26

## 2017-10-20 RX ORDER — LISDEXAMFETAMINE DIMESYLATE 70 MG/1
140 CAPSULE ORAL 2 TIMES DAILY
Qty: 120 CAPSULE | Refills: 0 | Status: SHIPPED | OUTPATIENT
Start: 2017-12-20 | End: 2017-10-26

## 2017-10-20 NOTE — TELEPHONE ENCOUNTER
Second message received today:    Tori Dueñas Michelle, RN        Phone Number: [phone]                     Caller:  pt   Medication:  Dexedrine, vyvanse   Pharmacy and location:  [facility] [address]   Have you contacted the pharmacy:   How much of medication do you have left:  A week left   Pending appt: in January   Okay to leave VM:  Yes     The pt would also like a letter of recommendation for bariatric surgery. Please call the pt to confirm her refill got processed and this message got passed along to Dr. Robledo (she emailed him a couple weeks ago and still hasn't gotten a response)

## 2017-10-20 NOTE — TELEPHONE ENCOUNTER
Called patient to let her know that the letter is being prepared.    She would prefer for scripts to be mailed to her when ready.

## 2017-10-20 NOTE — TELEPHONE ENCOUNTER
Last office visit:                    7/31/17  Follow up appointment:        1/18/18    Called patient who reported that she has sent emails to Dr. Robledo's [employer] email regarding her request for a letter of support for bariatric surgery.  She indicated that the emails outlines a series of questions that need to be answered by a psychiatrist.  She also needs refills of Vyvanse and Dexedrine.    Received RF request from patient for:    lisdexamfetamine (VYVANSE) 70 MG capsule 120 capsule 0 7/31/2017  No   Sig: Take 2 capsules (140 mg) by mouth 2 times daily     dextroamphetamine (DEXTROSTAT) 10 MG tablet 60 tablet 0 7/31/2017  No   Sig: Take 1 tablet (10 mg) by mouth 2 times daily       Last RF:  Per med tab:  7/31/17  Per [place] :      Vyvanse - 9/22, 8/25, 7/28, 6/26    Dextrostat - 9/21, 8/11, 6/20, 5/17    Due for RF:  yes    Appointment History:  Last appt: 7/31/17  RTC: not indicated  Cancel: none  No-show: none  Next appt: 1/18/18      Will route to Dr. Robledo for authorization to refill controlled medications and recommendation regarding the letter being requested.

## 2017-10-20 NOTE — TELEPHONE ENCOUNTER
----- Message from Marianne Anne RN sent at 10/20/2017  1:27 PM CDT -----  Contact: [phone]      ----- Message -----     From: Lavinia Swanson     Sent: 10/20/2017  11:27 AM       To: ROLANDA Ramos, would like for Dr. Robledo to reply to her emails or get a call from the nurse to let her know if Dr. Robledo,  received the emails. Regarding medications and a letter of recommendation for bariatrics. This is all the info given.    Thanks!

## 2017-10-20 NOTE — TELEPHONE ENCOUNTER
Per Dr. Robledo, the letter has been dictated and is being transcribed.  Okay to provide three scripts for Vyvanse and Dextrostat.    Scripts dated 10/20, 11/20, and 12/20 printed and placed in Dr. Robledo's folder for signature.

## 2017-10-26 DIAGNOSIS — F90.0 ATTENTION DEFICIT HYPERACTIVITY DISORDER (ADHD), PREDOMINANTLY INATTENTIVE TYPE: ICD-10-CM

## 2017-10-26 RX ORDER — LISDEXAMFETAMINE DIMESYLATE 70 MG/1
140 CAPSULE ORAL 2 TIMES DAILY
Qty: 120 CAPSULE | Refills: 0 | Status: SHIPPED | OUTPATIENT
Start: 2017-12-20 | End: 2018-01-18

## 2017-10-26 RX ORDER — DEXTROAMPHETAMINE SULFATE 10 MG/1
10 TABLET ORAL
Qty: 60 TABLET | Refills: 0 | Status: SHIPPED | OUTPATIENT
Start: 2017-12-20 | End: 2017-10-26

## 2017-10-26 RX ORDER — DEXTROAMPHETAMINE SULFATE 10 MG/1
10 TABLET ORAL
Qty: 60 TABLET | Refills: 0 | Status: SHIPPED | OUTPATIENT
Start: 2017-12-20 | End: 2018-01-18

## 2017-10-26 RX ORDER — LISDEXAMFETAMINE DIMESYLATE 70 MG/1
140 CAPSULE ORAL 2 TIMES DAILY
Qty: 120 CAPSULE | Refills: 0 | Status: SHIPPED | OUTPATIENT
Start: 2017-12-20 | End: 2017-10-26

## 2017-10-26 NOTE — TELEPHONE ENCOUNTER
"Violette Chen Michelle, RN        Phone Number: [phone]                     Caller:  Lexus   Medication:  Vyvance and Dextrostat   Pharmacy and location: Will  at Clinic when ready   How much of medication do you have left:  0   Pending appt: 1/18   Okay to leave VM:  yes       Returned call to pt to explain that scripts were mailed to her home address yesterday.  This was mailed due to pt request per message below.  Pt states that this \"will not work\" as she is out of the medication and scripts were not in today's mail.  Requests to p/u scripts in clinic today.  Writer stated would see if Dr. Robledo is willing to sign one script of each.  Pt requests a c/b once scripts are signed.  "

## 2017-10-26 NOTE — TELEPHONE ENCOUNTER
Dr. Robledo signs prescriptions for Vyvanse and Dextrostat.  Also prints an additional copy of each.  Pt will have 5 scripts for each medication once scripts are rec'd in the mail.  Dr. Robledo reports to have also addressed the letter request.    Called pt with update.

## 2018-01-18 ENCOUNTER — OFFICE VISIT (OUTPATIENT)
Dept: PSYCHIATRY | Facility: CLINIC | Age: 28
End: 2018-01-18
Attending: PSYCHIATRY & NEUROLOGY
Payer: COMMERCIAL

## 2018-01-18 DIAGNOSIS — F33.2 MAJOR DEPRESSIVE DISORDER, RECURRENT, SEVERE WITHOUT PSYCHOTIC FEATURES (H): ICD-10-CM

## 2018-01-18 DIAGNOSIS — F90.0 ATTENTION DEFICIT HYPERACTIVITY DISORDER (ADHD), PREDOMINANTLY INATTENTIVE TYPE: ICD-10-CM

## 2018-01-18 DIAGNOSIS — F33.1 MODERATE EPISODE OF RECURRENT MAJOR DEPRESSIVE DISORDER (H): Primary | ICD-10-CM

## 2018-01-18 RX ORDER — FLUOXETINE 40 MG/1
80 CAPSULE ORAL DAILY
Qty: 120 CAPSULE | Refills: 3 | Status: SHIPPED | OUTPATIENT
Start: 2018-01-18 | End: 2018-04-12

## 2018-01-18 RX ORDER — DEXTROAMPHETAMINE SULFATE 10 MG/1
10 TABLET ORAL
Qty: 60 TABLET | Refills: 0 | Status: SHIPPED | OUTPATIENT
Start: 2018-01-18 | End: 2018-04-12

## 2018-01-18 RX ORDER — DEXTROAMPHETAMINE SULFATE 10 MG/1
10 TABLET ORAL
Qty: 60 TABLET | Refills: 0 | Status: SHIPPED | OUTPATIENT
Start: 2018-01-18 | End: 2018-01-18

## 2018-01-18 RX ORDER — ARIPIPRAZOLE 10 MG/1
10 TABLET ORAL DAILY
Qty: 30 TABLET | Refills: 3 | Status: SHIPPED | OUTPATIENT
Start: 2018-01-18 | End: 2018-04-12

## 2018-01-18 RX ORDER — BUPROPION HYDROCHLORIDE 75 MG/1
150 TABLET ORAL 2 TIMES DAILY
Qty: 120 TABLET | Refills: 3 | Status: SHIPPED | OUTPATIENT
Start: 2018-01-18 | End: 2018-05-25

## 2018-01-18 RX ORDER — LISDEXAMFETAMINE DIMESYLATE 70 MG/1
140 CAPSULE ORAL 2 TIMES DAILY
Qty: 120 CAPSULE | Refills: 0 | Status: SHIPPED | OUTPATIENT
Start: 2018-01-18 | End: 2018-04-12

## 2018-01-18 RX ORDER — LISDEXAMFETAMINE DIMESYLATE 70 MG/1
140 CAPSULE ORAL 2 TIMES DAILY
Qty: 120 CAPSULE | Refills: 0 | Status: SHIPPED | OUTPATIENT
Start: 2018-01-18 | End: 2018-01-18

## 2018-01-18 NOTE — MR AVS SNAPSHOT
After Visit Summary   2018    Lexus Uribe    MRN: [id number]           Patient Information     Date Of Birth          1990        Visit Information        Provider Department      2018 4:30 PM Bret Robledo MD Psychiatry Clinic        Today's Diagnoses     Moderate episode of recurrent major depressive disorder (H)    -  1    Attention deficit hyperactivity disorder (ADHD), predominantly inattentive type        Major depressive disorder, recurrent, severe without psychotic features (H)           Follow-ups after your visit        Your next 10 appointments already scheduled     2018  4:30 PM CDT   Adult Med Follow UP with Bret Robledo MD   Psychiatry Clinic ([facility])    [address] [facility] [address] [phone]              Who to contact     Please call your clinic at [phone] to:    Ask questions about your health    Make or cancel appointments    Discuss your medicines    Learn about your test results    Speak to your doctor            Additional Information About Your Visit        MyChart Information     WhoGotStuff is an electronic gateway that provides easy, online access to your medical records. With WhoGotStuff, you can request a clinic appointment, read your test results, renew a prescription or communicate with your care team.     To sign up for [employer] visit the website at [email or web address]   You will be asked to enter the access code listed below, as well as some personal information. Please follow the directions to create your username and password.     Your access code is: [email or web address]  Expires: 2018  7:35 AM     Your access code will  in 90 days. If you need help or a new code, please contact your [facility] or call [phone] for assistance.        Care EveryWhere ID     This is your Care EveryWhere ID. This could be used by other  organizations to access your [place] medical records  [id number]         Blood Pressure from Last 3 Encounters:   04/18/16 (!) 165/109   03/28/16 (!) 170/123   09/21/13 (!) 176/108    Weight from Last 3 Encounters:   08/24/16 (!) 191.1 kg (421 lb 3.2 oz)   07/26/16 (!) 188.8 kg (416 lb 3.2 oz)   06/03/16 (!) 181.4 kg (400 lb)              Today, you had the following     No orders found for display         Today's Medication Changes          These changes are accurate as of 1/18/18 11:59 PM.  If you have any questions, ask your nurse or doctor.               Start taking these medicines.        Dose/Directions    buPROPion 75 MG tablet   Commonly known as:  WELLBUTRIN   Used for:  Moderate episode of recurrent major depressive disorder (H)   Replaces:  buPROPion 150 MG 24 hr tablet        Dose:  150 mg   Take 2 tablets (150 mg) by mouth 2 times daily   Quantity:  120 tablet   Refills:  3       dextroamphetamine 10 MG tablet   Commonly known as:  DEXTROSTAT   Used for:  Attention deficit hyperactivity disorder (ADHD), predominantly inattentive type        Dose:  10 mg   Take 1 tablet (10 mg) by mouth 2 times daily   Quantity:  60 tablet   Refills:  0       lisdexamfetamine 70 MG capsule   Commonly known as:  VYVANSE   Used for:  Attention deficit hyperactivity disorder (ADHD), predominantly inattentive type        Dose:  140 mg   Take 2 capsules (140 mg) by mouth 2 times daily   Quantity:  120 capsule   Refills:  0         Stop taking these medicines if you haven't already. Please contact your care team if you have questions.     buPROPion 150 MG 24 hr tablet   Commonly known as:  WELLBUTRIN XL   Replaced by:  buPROPion 75 MG tablet                Where to get your medicines      These medications were sent to [facility]/pharmacy #[id number] - [place], [place] - [address]  [address] [facility] [address]     Phone:  [phone]     ARIPiprazole 10 MG tablet    buPROPion 75 MG tablet    FLUoxetine 40 MG  capsule         Some of these will need a paper prescription and others can be bought over the counter.  Ask your nurse if you have questions.     Bring a paper prescription for each of these medications     dextroamphetamine 10 MG tablet    lisdexamfetamine 70 MG capsule                Primary Care Provider Office Phone # Fax #    Renetta Shafer [phone]       [facility] [address] [facility] [address]        Equal Access to Services     DANIA HECK : Hadii aad ku hadasho Soomaali, waaxda luqadaha, qaybta kaalmada adeegyada, waxay idiin hayaan adeeg ernietrino lajt . So [facility] [phone].    ATENCIÓN: Si tereza romero, tiene a amador disposición servicios gratuitos de asistencia lingüística. Phani al [phone].    We comply with applicable federal civil rights laws and [place] laws. We do not discriminate on the basis of race, color, national origin, age, disability, sex, sexual orientation, or gender identity.            Thank you!     Thank you for choosing PSYCHIATRY CLINIC  for your care. Our goal is always to provide you with excellent care. Hearing back from our patients is one way we can continue to improve our services. Please take a few minutes to complete the written survey that you may receive in the mail after your visit with us. Thank you!             Your Updated Medication List - Protect others around you: Learn how to safely use, store and throw away your medicines at www.disposemymeds.org.          This list is accurate as of 1/18/18 11:59 PM.  Always use your most recent med list.                   Brand Name Dispense Instructions for use Diagnosis    ARIPiprazole 10 MG tablet    ABILIFY    30 tablet    Take 1 tablet (10 mg) by mouth daily    Major depressive disorder, recurrent, severe without psychotic features (H)       buPROPion 75 MG tablet    WELLBUTRIN    120 tablet    Take 2 tablets (150 mg) by mouth 2 times daily    Moderate episode of recurrent major  depressive disorder (H)       dextroamphetamine 10 MG tablet    DEXTROSTAT    60 tablet    Take 1 tablet (10 mg) by mouth 2 times daily    Attention deficit hyperactivity disorder (ADHD), predominantly inattentive type       FLUoxetine 40 MG capsule    PROzac    120 capsule    Take 2 capsules (80 mg) by mouth daily    Moderate episode of recurrent major depressive disorder (H)       lamoTRIgine 200 MG tablet    LaMICtal    1 tablet    Take 2 tablets (400 mg) by mouth daily    Severe episode of recurrent major depressive disorder, without psychotic features (H)       lisdexamfetamine 70 MG capsule    VYVANSE    120 capsule    Take 2 capsules (140 mg) by mouth 2 times daily    Attention deficit hyperactivity disorder (ADHD), predominantly inattentive type       * LOSARTAN POTASSIUM PO      Take 25 mg by mouth daily        * losartan 25 MG tablet    COZAAR    1 tablet    Take 1 tablet (25 mg) by mouth daily    Benign essential hypertension       OMEPRAZOLE PO      Take 40 mg by mouth every morning        omeprazole-sodium bicarbonate  MG per capsule    ZEGERID     1 capsule        ZANTAC PO      Take 300 mg by mouth At Bedtime        * Notice:  This list has 2 medication(s) that are the same as other medications prescribed for you. Read the directions carefully, and ask your doctor or other care provider to review them with you.

## 2018-03-22 ENCOUNTER — TELEPHONE (OUTPATIENT)
Dept: PSYCHIATRY | Facility: CLINIC | Age: 28
End: 2018-03-22

## 2018-03-22 DIAGNOSIS — F33.2 SEVERE EPISODE OF RECURRENT MAJOR DEPRESSIVE DISORDER, WITHOUT PSYCHOTIC FEATURES (H): ICD-10-CM

## 2018-03-22 NOTE — TELEPHONE ENCOUNTER
Last seen: 1/18/18  RTC: April (3 months)  Cancel: none  No-show: none  Next appt: 4/12/18     Medication requested: Lamotrigine 200 mg  Directions: Take 2 tabs (400 mg) PO daily  Qty: 1- per med tab  Last refilled: 7/31/17- per med tab     Due to unusual last refill date and qty, writer called pt to inquire more information about how pt is taking medication. According to the pt, she has been taking 2 tabs (400 mg daily) as instructed by the provider, but says she has been taking her dad's Lamictal rather than her own. She states it's the same dosage. Will route to provider for further recommendations.

## 2018-03-22 NOTE — TELEPHONE ENCOUNTER
Delia Wilson, Delia Florez, RN       Phone Number: [phone]                       Previous Messages       ----- Message -----      From: Tori Dueñas      Sent: 3/22/2018   3:36 PM        To: Leonora Carolina RN   Subject: refill request                                   Caller:  pt   Relationship to pt:   Medication:   Lamotrigine 2-200mg tabs   How many days left of med do you have left (if >3 day supply, redirect to pharmacy): been out for 2 days   Pharmacy and location: [address]   Pending appt date:  4/12

## 2018-03-23 RX ORDER — LAMOTRIGINE 200 MG/1
400 TABLET ORAL DAILY
Qty: 60 TABLET | Refills: 0 | Status: SHIPPED | OUTPATIENT
Start: 2018-03-23 | End: 2018-03-23

## 2018-03-23 RX ORDER — LAMOTRIGINE 200 MG/1
400 TABLET ORAL DAILY
Qty: 60 TABLET | Refills: 0 | Status: SHIPPED | OUTPATIENT
Start: 2018-03-23 | End: 2018-04-12

## 2018-03-23 NOTE — TELEPHONE ENCOUNTER
Writer refilled medication for a 30 d/s without any refills, due to upcoming appt on 4/12/18. Called pt and left VM to inform pt of this. Asked for c/b if pt has any questions.

## 2018-03-23 NOTE — TELEPHONE ENCOUNTER
Message  Received: Yesterday       Bret Robledo MD Pratt, Laura, RN       Caller: Unspecified (Yesterday,  4:09 PM)                     Fill at same dose.  If she starts back today, she can just go to the full amount.  If she waits another day or so, she will need to start back on 200 mg.

## 2018-03-23 NOTE — TELEPHONE ENCOUNTER
Per pt phone call, Lamictal prescription sent to [facility] pharmacy yesterday. Writer cancelled refill at [facility] pharmacy on [facility] and sent in e-prescription to [facility] on [address] per pt request.

## 2018-04-12 ENCOUNTER — OFFICE VISIT (OUTPATIENT)
Dept: PSYCHIATRY | Facility: CLINIC | Age: 28
End: 2018-04-12
Attending: PSYCHIATRY & NEUROLOGY
Payer: COMMERCIAL

## 2018-04-12 DIAGNOSIS — F90.0 ATTENTION DEFICIT HYPERACTIVITY DISORDER (ADHD), PREDOMINANTLY INATTENTIVE TYPE: ICD-10-CM

## 2018-04-12 DIAGNOSIS — F33.2 SEVERE EPISODE OF RECURRENT MAJOR DEPRESSIVE DISORDER, WITHOUT PSYCHOTIC FEATURES (H): ICD-10-CM

## 2018-04-12 DIAGNOSIS — F33.2 MAJOR DEPRESSIVE DISORDER, RECURRENT, SEVERE WITHOUT PSYCHOTIC FEATURES (H): ICD-10-CM

## 2018-04-12 DIAGNOSIS — F33.1 MODERATE EPISODE OF RECURRENT MAJOR DEPRESSIVE DISORDER (H): ICD-10-CM

## 2018-04-12 RX ORDER — LAMOTRIGINE 200 MG/1
400 TABLET ORAL DAILY
Qty: 60 TABLET | Refills: 3 | Status: SHIPPED | OUTPATIENT
Start: 2018-04-12 | End: 2018-06-19

## 2018-04-12 RX ORDER — LISDEXAMFETAMINE DIMESYLATE 70 MG/1
140 CAPSULE ORAL 2 TIMES DAILY
Qty: 120 CAPSULE | Refills: 0 | Status: SHIPPED | OUTPATIENT
Start: 2018-04-12 | End: 2018-04-12

## 2018-04-12 RX ORDER — LISDEXAMFETAMINE DIMESYLATE 70 MG/1
140 CAPSULE ORAL 2 TIMES DAILY
Qty: 120 CAPSULE | Refills: 0 | Status: SHIPPED | OUTPATIENT
Start: 2018-04-12 | End: 2018-07-12

## 2018-04-12 RX ORDER — ARIPIPRAZOLE 10 MG/1
10 TABLET ORAL DAILY
Qty: 30 TABLET | Refills: 3 | Status: SHIPPED | OUTPATIENT
Start: 2018-04-12 | End: 2018-07-06

## 2018-04-12 RX ORDER — FLUOXETINE 40 MG/1
80 CAPSULE ORAL DAILY
Qty: 120 CAPSULE | Refills: 3 | Status: SHIPPED | OUTPATIENT
Start: 2018-04-12 | End: 2018-07-12

## 2018-04-12 RX ORDER — DEXTROAMPHETAMINE SULFATE 10 MG/1
10 TABLET ORAL
Qty: 60 TABLET | Refills: 0 | Status: SHIPPED | OUTPATIENT
Start: 2018-04-12 | End: 2018-04-12

## 2018-04-12 RX ORDER — DEXTROAMPHETAMINE SULFATE 10 MG/1
10 TABLET ORAL
Qty: 60 TABLET | Refills: 0 | Status: SHIPPED | OUTPATIENT
Start: 2018-04-12 | End: 2018-07-12

## 2018-04-12 NOTE — MR AVS SNAPSHOT
After Visit Summary   2018    Lexus Uribe    MRN: [id number]           Patient Information     Date Of Birth          1990        Visit Information        Provider Department      2018 4:30 PM Bret Robledo MD Psychiatry Clinic        Today's Diagnoses     Severe episode of recurrent major depressive disorder, without psychotic features (H)        Major depressive disorder, recurrent, severe without psychotic features (H)        Moderate episode of recurrent major depressive disorder (H)        Attention deficit hyperactivity disorder (ADHD), predominantly inattentive type           Follow-ups after your visit        Your next 10 appointments already scheduled     2018  4:30 PM CDT   Adult Med Follow UP with Bret Robledo MD   Psychiatry Clinic ([facility])    [place] [id number] [address] [address] [phone]              Who to contact     Please call your clinic at [phone] to:    Ask questions about your health    Make or cancel appointments    Discuss your medicines    Learn about your test results    Speak to your doctor            Additional Information About Your Visit        MyChart Information     Optimitive is an electronic gateway that provides easy, online access to your medical records. With Optimitive, you can request a clinic appointment, read your test results, renew a prescription or communicate with your care team.     To sign up for Optimitive visit the website at [email or web address]   You will be asked to enter the access code listed below, as well as some personal information. Please follow the directions to create your username and password.     Your access code is: [email or web address]  Expires: 2018  8:35 AM     Your access code will  in 90 days. If you need help or a new code, please contact your [facility] or call [phone] for assistance.         Care EveryWhere ID     This is your Care EveryWhere ID. This could be used by other organizations to access your [place] medical records  [id number]         Blood Pressure from Last 3 Encounters:   04/18/16 (!) 165/109   03/28/16 (!) 170/123   09/21/13 (!) 176/108    Weight from Last 3 Encounters:   08/24/16 (!) 191.1 kg (421 lb 3.2 oz)   07/26/16 (!) 188.8 kg (416 lb 3.2 oz)   06/03/16 (!) 181.4 kg (400 lb)              Today, you had the following     No orders found for display         Today's Medication Changes          These changes are accurate as of 4/12/18 11:59 PM.  If you have any questions, ask your nurse or doctor.               Start taking these medicines.        Dose/Directions    dextroamphetamine 10 MG tablet   Commonly known as:  DEXTROSTAT   Used for:  Attention deficit hyperactivity disorder (ADHD), predominantly inattentive type        Dose:  10 mg   Take 1 tablet (10 mg) by mouth 2 times daily   Quantity:  60 tablet   Refills:  0       lisdexamfetamine 70 MG capsule   Commonly known as:  VYVANSE   Used for:  Attention deficit hyperactivity disorder (ADHD), predominantly inattentive type        Dose:  140 mg   Take 2 capsules (140 mg) by mouth 2 times daily   Quantity:  120 capsule   Refills:  0            Where to get your medicines      These medications were sent to [facility] [address] [place] [address] & [address] [address]     Phone:  [phone]     ARIPiprazole 10 MG tablet    FLUoxetine 40 MG capsule    lamoTRIgine 200 MG tablet         Some of these will need a paper prescription and others can be bought over the counter.  Ask your nurse if you have questions.     Bring a paper prescription for each of these medications     dextroamphetamine 10 MG tablet    lisdexamfetamine 70 MG capsule                Primary Care Provider Office Phone # Fax #    Renetta Shafer [phone]       JENNIFER  [address] [address]        Equal Access to Services     DARIUSMIRTHA UMANG : Hadii shane xiao willianchiara Sojoshali, wacarrieda luqadaha, qaaugustata janetmikenaomi lackeygabriellenaomi, megan murillo haychaparro martinramyatrino whiting. So [facility] [phone].    ATENCIÓN: Si habla español, tiene a amador disposición servicios gratuitos de asistencia lingüística. Llame al [phone].    We comply with applicable federal civil rights laws and [place] laws. We do not discriminate on the basis of race, color, national origin, age, disability, sex, sexual orientation, or gender identity.            Thank you!     Thank you for choosing PSYCHIATRY CLINIC  for your care. Our goal is always to provide you with excellent care. Hearing back from our patients is one way we can continue to improve our services. Please take a few minutes to complete the written survey that you may receive in the mail after your visit with us. Thank you!             Your Updated Medication List - Protect others around you: Learn how to safely use, store and throw away your medicines at www.disposemymeds.org.          This list is accurate as of 4/12/18 11:59 PM.  Always use your most recent med list.                   Brand Name Dispense Instructions for use Diagnosis    ARIPiprazole 10 MG tablet    ABILIFY    30 tablet    Take 1 tablet (10 mg) by mouth daily    Major depressive disorder, recurrent, severe without psychotic features (H)       buPROPion 75 MG tablet    WELLBUTRIN    120 tablet    Take 2 tablets (150 mg) by mouth 2 times daily    Moderate episode of recurrent major depressive disorder (H)       dextroamphetamine 10 MG tablet    DEXTROSTAT    60 tablet    Take 1 tablet (10 mg) by mouth 2 times daily    Attention deficit hyperactivity disorder (ADHD), predominantly inattentive type       FLUoxetine 40 MG capsule    PROzac    120 capsule    Take 2 capsules (80 mg) by mouth daily    Moderate episode of recurrent major depressive disorder (H)        lamoTRIgine 200 MG tablet    LaMICtal    60 tablet    Take 2 tablets (400 mg) by mouth daily    Severe episode of recurrent major depressive disorder, without psychotic features (H)       lisdexamfetamine 70 MG capsule    VYVANSE    120 capsule    Take 2 capsules (140 mg) by mouth 2 times daily    Attention deficit hyperactivity disorder (ADHD), predominantly inattentive type       * LOSARTAN POTASSIUM PO      Take 25 mg by mouth daily        * losartan 25 MG tablet    COZAAR    1 tablet    Take 1 tablet (25 mg) by mouth daily    Benign essential hypertension       OMEPRAZOLE PO      Take 40 mg by mouth every morning        omeprazole-sodium bicarbonate  MG per capsule    ZEGERID     1 capsule        ZANTAC PO      Take 300 mg by mouth At Bedtime        * Notice:  This list has 2 medication(s) that are the same as other medications prescribed for you. Read the directions carefully, and ask your doctor or other care provider to review them with you.

## 2018-04-17 NOTE — PROGRESS NOTES
PSYCHIATRY CLINIC PROGRESS NOTE      The patient returns for medication management and supportive therapy.      Interim History   Since the last visit Lexus had gastric sleeve surgery, which went well. She's lost 35 lbs so far. She was just admitted to the [facility]. Most of her time is taken up with school. She is concerned about her concentration, but overall she is extremely optimistic.       RECENT SYMPTOMS   Depression:   Patent endorses some periods of depressed mood. She is still hard to motivate, but that is improving. Her appetite is changed secondary to surgery.       Elevated mood:   Patient denies symptoms of maryann.      Psychosis:   Patient denies symptoms of maryann.      Panic Attacks:   Patient denies symptoms of panic.      Anxiety:   Patient denies excessive worries.      Trauma-related:   Patient denies trauma-related symptoms      ADVERSE EFFECTS:   Restless legs      MEDICAL CONCERNS   See Interim History.      SUBSTANCE USE:   Patient denies substance use.      SOCIAL/FAMILY HISTORY:   Patient is staying with her parents during her recuperation period (she usually lives alone). Obesity has prevented her from socializing.       MEDICAL/SURGICAL HISTORY:   See EMR medical problems list.      MEDICAL REVIEW OF SYSTEMS:   A comprehensive review of systems was performed and is negative other than as noted in the HPI.      ALLERGY:   None new.       CURRENT MEDICATIONS:   See EMR med sections.      VITALS:   See rooming note.      MENTAL STATUS EXAM:   Patient was alert, well groomed, and cooperative. Speech was normal rate and flow. Movements were normal. Mood was upbeat and smiling. Thought processes were logical and goal-directed with no suicidal ideation or psychosis. Insight and judgment were good. Patient was oriented x4 with adequate attention and concentration and intact memory. Fund of knowledge was good. Gait and station were normal.       Labs and Data:         DIAGNOSIS AND  ASSESSMENT:   1. MDD, recurrent, in remission   2. ADD      PLAN:   1. Medications:    a. Change Wellbutrin to extended release formulation   2. Therapy: She has not been seeing a therapist.   3. RTC in two months.   4. Other:   5. Crisis numbers: provided routinely in AVS.      Treatment Risk Statement: The patient understands the risks, benefits, adverse effects, and alternatives. Agrees to treatment with the capacity to do so. No medical contraindications to treatment. Agrees to call clinic for any problems. The patient understands to call 911 or come to the nearest ED is life-threatening or urgent symptoms present.      PSYCHIATRY CLINIC INDIVIDUAL PSYCHOTHERAPY NOTE:   Start time: 4:42   End time: 5:00   Date reviewed:    Date due next:   Subjective: The supportive psychotherapy session addressed issues related to coping with chronic illness   Progress: Fair to good   Plan: RTC in two months      Psychotherapy services during this visit included myself and the patient.      TREATMENT PLAN:   Symptom/Problem: Depression and ADHD   Measurable Goals/Functional Improvement: Improve decision-making skills, increase time spent with others.   Interventions/Gains Made: Building on strengths   Discharge Criteria: Symptom reduction

## 2018-05-25 ENCOUNTER — TELEPHONE (OUTPATIENT)
Dept: PSYCHIATRY | Facility: CLINIC | Age: 28
End: 2018-05-25

## 2018-05-25 DIAGNOSIS — F33.1 MAJOR DEPRESSIVE DISORDER, RECURRENT EPISODE, MODERATE (H): Primary | ICD-10-CM

## 2018-05-25 RX ORDER — BUPROPION HYDROCHLORIDE 300 MG/1
300 TABLET ORAL EVERY MORNING
Qty: 30 TABLET | Refills: 1 | Status: SHIPPED | OUTPATIENT
Start: 2018-05-25 | End: 2018-07-12

## 2018-05-25 NOTE — TELEPHONE ENCOUNTER
Prior Authorization Retail Medication Request    Medication/Dose: Vyvanse 70 mg  ICD code (if different than what is on RX):  F90.0  Previously Tried and Failed:  Please see approval in telephone encounter from 1/16/17  Rationale:  Please see approval in telephone encounter from 1/16/17    Insurance Name:  Count includes the [facility]  Insurance ID:  [id number]      Pharmacy Information (if different than what is on RX)  Name:    Phone:

## 2018-05-25 NOTE — TELEPHONE ENCOUNTER
Central Prior Authorization Team   Phone: [phone]    PA Initiation    Medication: Vyvanse 70 mg  Insurance Company: [employer] - Phone [phone] Fax [phone]  [facility] [address] [place], [place] - [address] [place] [address] & [facility]  Filling Pharmacy Phone: [phone]  Filling Pharmacy Fax:    Start Date: 5/25/2018

## 2018-05-30 NOTE — TELEPHONE ENCOUNTER
Prior Authorization Approval    Authorization Effective Date: 4/25/2018  Authorization Expiration Date: 5/25/2019  Medication: Vyvanse 70 mg - approved  Approved Dose/Quantity:   Reference #:     Insurance Company: [employer] - Phone [phone] Fax [phone]  Expected CoPay: n/a     CoPay Card Available:      Foundation Assistance Needed:    Which Pharmacy is filling the prescription (Not needed for infusion/clinic administered): [facility] [place], [place] - [address] [place] [address] & [facility]  Pharmacy Notified: Yes  Patient Notified: Yes

## 2018-06-19 ENCOUNTER — TELEPHONE (OUTPATIENT)
Dept: PSYCHIATRY | Facility: CLINIC | Age: 28
End: 2018-06-19

## 2018-06-19 DIAGNOSIS — F33.2 SEVERE EPISODE OF RECURRENT MAJOR DEPRESSIVE DISORDER, WITHOUT PSYCHOTIC FEATURES (H): ICD-10-CM

## 2018-06-19 RX ORDER — LAMOTRIGINE 200 MG/1
400 TABLET ORAL DAILY
Qty: 60 TABLET | Refills: 0 | Status: SHIPPED | OUTPATIENT
Start: 2018-06-19 | End: 2018-07-12

## 2018-06-19 NOTE — TELEPHONE ENCOUNTER
-Received incoming phone call from pt requesting refill of Lamictal ASAP as she's going out of town tomorrow.     Last seen: 4/12/18  RTC: 2 months  Cancel: none  No-show: none  Next appt: 7/12/18     Medication requested: lamoTRIgine (LAMICTAL) 200 MG tablet  Directions: Take 2 tablets (400 mg) by mouth daily - Oral  Qty: 60    Medication refill approved per refill protocol  30 d/s with 0 additional refills sent to pharmacy   Med tab changed to reflect this  Called pt and informed her that refill has been sent

## 2018-07-03 DIAGNOSIS — F33.2 SEVERE EPISODE OF RECURRENT MAJOR DEPRESSIVE DISORDER, WITHOUT PSYCHOTIC FEATURES (H): ICD-10-CM

## 2018-07-05 DIAGNOSIS — F33.2 MAJOR DEPRESSIVE DISORDER, RECURRENT, SEVERE WITHOUT PSYCHOTIC FEATURES (H): ICD-10-CM

## 2018-07-05 DIAGNOSIS — F33.1 MAJOR DEPRESSIVE DISORDER, RECURRENT EPISODE, MODERATE (H): ICD-10-CM

## 2018-07-05 RX ORDER — ARIPIPRAZOLE 10 MG/1
10 TABLET ORAL DAILY
Qty: 30 TABLET | Refills: 3 | Status: CANCELLED | OUTPATIENT
Start: 2018-07-05

## 2018-07-05 RX ORDER — BUPROPION HYDROCHLORIDE 300 MG/1
300 TABLET ORAL EVERY MORNING
Qty: 30 TABLET | Refills: 1 | Status: CANCELLED | OUTPATIENT
Start: 2018-07-05

## 2018-07-05 RX ORDER — LAMOTRIGINE 200 MG/1
400 TABLET ORAL DAILY
Qty: 60 TABLET | Refills: 0 | OUTPATIENT
Start: 2018-07-05

## 2018-07-06 ENCOUNTER — TELEPHONE (OUTPATIENT)
Dept: PSYCHIATRY | Facility: CLINIC | Age: 28
End: 2018-07-06

## 2018-07-06 DIAGNOSIS — F33.2 MAJOR DEPRESSIVE DISORDER, RECURRENT, SEVERE WITHOUT PSYCHOTIC FEATURES (H): ICD-10-CM

## 2018-07-06 RX ORDER — ARIPIPRAZOLE 10 MG/1
10 TABLET ORAL DAILY
Qty: 30 TABLET | Refills: 0 | Status: SHIPPED | OUTPATIENT
Start: 2018-07-06 | End: 2018-07-12

## 2018-07-06 NOTE — TELEPHONE ENCOUNTER
-Received incoming phone call from pt at [phone] requesting refill of Abilify 10 mg    -According to the pt she has been off of the medication for approximately 1 week. Writer reviewed chart and it appears pt should have one more refill available. Pt thought this was also true, but believes Ila made a mistake. Writer informed her that the message will be routed to the provider for okay to refill and for pt to restart medication. Will call pt at provided number with update and will refill per provider's okay

## 2018-07-06 NOTE — TELEPHONE ENCOUNTER
Message  Received: Today       Bret Robledo MD Pratt, Laura RN       Caller: Unspecified (Today,  9:56 AM)                     OK to restart full dose.      Last seen: 4/12/17  RTC: two months   Cancel: none  No-show: none  Next appt: 7/12/18     Medication requested: ARIPiprazole (ABILIFY) 10 MG tablet  Directions: Take 1 tablet (10 mg) by mouth daily - Oral  Qty: 30     Medication refill approved per refill protocol  30 d/s sent to pt's pharmacy   Called pt and informed her that refill has been sent.

## 2018-07-12 ENCOUNTER — OFFICE VISIT (OUTPATIENT)
Dept: PSYCHIATRY | Facility: CLINIC | Age: 28
End: 2018-07-12
Attending: PSYCHIATRY & NEUROLOGY
Payer: COMMERCIAL

## 2018-07-12 DIAGNOSIS — F33.2 MAJOR DEPRESSIVE DISORDER, RECURRENT, SEVERE WITHOUT PSYCHOTIC FEATURES (H): ICD-10-CM

## 2018-07-12 DIAGNOSIS — F33.2 SEVERE EPISODE OF RECURRENT MAJOR DEPRESSIVE DISORDER, WITHOUT PSYCHOTIC FEATURES (H): ICD-10-CM

## 2018-07-12 DIAGNOSIS — F33.1 MODERATE EPISODE OF RECURRENT MAJOR DEPRESSIVE DISORDER (H): ICD-10-CM

## 2018-07-12 DIAGNOSIS — F90.0 ATTENTION DEFICIT HYPERACTIVITY DISORDER (ADHD), PREDOMINANTLY INATTENTIVE TYPE: ICD-10-CM

## 2018-07-12 DIAGNOSIS — F33.1 MAJOR DEPRESSIVE DISORDER, RECURRENT EPISODE, MODERATE (H): ICD-10-CM

## 2018-07-12 RX ORDER — BUPROPION HYDROCHLORIDE 150 MG/1
300 TABLET ORAL EVERY MORNING
Qty: 180 TABLET | Refills: 0 | Status: SHIPPED | OUTPATIENT
Start: 2018-07-12 | End: 2018-10-11

## 2018-07-12 RX ORDER — ARIPIPRAZOLE 10 MG/1
10 TABLET ORAL DAILY
Qty: 90 TABLET | Refills: 0 | Status: SHIPPED | OUTPATIENT
Start: 2018-07-12 | End: 2018-10-11

## 2018-07-12 RX ORDER — LISDEXAMFETAMINE DIMESYLATE 70 MG/1
140 CAPSULE ORAL 2 TIMES DAILY
Qty: 120 CAPSULE | Refills: 0 | Status: SHIPPED | OUTPATIENT
Start: 2018-07-12 | End: 2018-10-11

## 2018-07-12 RX ORDER — DEXTROAMPHETAMINE SULFATE 10 MG/1
10 TABLET ORAL
Qty: 60 TABLET | Refills: 0 | Status: SHIPPED | OUTPATIENT
Start: 2018-07-12 | End: 2018-07-12

## 2018-07-12 RX ORDER — DEXTROAMPHETAMINE SULFATE 10 MG/1
10 TABLET ORAL
Qty: 60 TABLET | Refills: 0 | Status: SHIPPED | OUTPATIENT
Start: 2018-07-12 | End: 2018-10-11

## 2018-07-12 RX ORDER — LISDEXAMFETAMINE DIMESYLATE 70 MG/1
140 CAPSULE ORAL 2 TIMES DAILY
Qty: 120 CAPSULE | Refills: 0 | Status: SHIPPED | OUTPATIENT
Start: 2018-07-12 | End: 2018-07-12

## 2018-07-12 RX ORDER — FLUOXETINE 40 MG/1
80 CAPSULE ORAL DAILY
Qty: 180 CAPSULE | Refills: 0 | Status: SHIPPED | OUTPATIENT
Start: 2018-07-12 | End: 2018-10-11

## 2018-07-12 RX ORDER — LAMOTRIGINE 200 MG/1
400 TABLET ORAL DAILY
Qty: 180 TABLET | Refills: 0 | Status: SHIPPED | OUTPATIENT
Start: 2018-07-12 | End: 2018-10-11

## 2018-08-02 NOTE — PROGRESS NOTES
PSYCHIATRY CLINIC PROGRESS NOTE      The patient returns for medication management and supportive therapy.      Interim History   The patient was last seen six months ago, and since then she has had bariatric surgery; she has lost 60 lbs since then. She exercises five times per week and is taking classes - she will start at [employer] this fall.      RECENT SYMPTOMS   Depression:   The patient endorses much less depressed mood, improved energy, hypersomnia, decreased appetite, and feelings of worthlessness.       Elevated mood:   The patient denies symptoms of maryann.      Psychosis:   This patient denies symptoms of psychosis.      Panic Attacks:   The patient denies panic attacks.       Anxiety:   The patient endorses excessive worries.      Trauma-related:   The patient denies symptoms related to trauma.       ADVERSE EFFECTS:   Dry mouth.      MEDICAL CONCERNS:   1. Gastric sleeve surgery   2. No other medical problems      SUBSTANCE USE:   The patient endorses social alcohol use.      SOCIAL/FAMILY HISTORY:   The patient lives alone. Her family is very supportive and she does most of her recreational with them, but she has few other social contacts. She is doing well in school.      MEDICAL/SURGICAL HISTORY:   See EMR medical problems list.      MEDICAL REVIEW OF SYSTEMS:   A comprehensive review of systems was performed and is negative other than as noted in the HPI.      ALLERGY:   None.       CURRENT MEDICATIONS:   See EMR med sections.      VITALS:   See rooming note.      MENTAL STATUS EXAM:   On examination today, the patient was alert, well groomed, and cooperative with the interview. Speech was normal rate and flow. Movements, gait, and station were normal. Mood was positive and affect was bright. Thought processes were logical and devoid of suicidal ideation and psychosis. Insight and judgment were good. Patient was oriented x4 with good attention and concentration, intact memory, and good fund of  knowledge.       Labs and Data:         DIAGNOSIS AND ASSESSMENT:   1. MDD, recurrent, in remission.   2. Bariatric surgery and exercise are improving her self-esteem.       PLAN:   1. Medications: None   2. Therapy: Through bariatrics department.   3. RTC in three months.   4. Other:   5. Crisis numbers: provided routinely in AVS.      Treatment Risk Statement: The patient understands the risks, benefits, adverse effects, and alternatives. Agrees to treatment with the capacity to do so. No medical contraindications to treatment. Agrees to call clinic for any problems. The patient understands to call 911 or come to the nearest ED is life-threatening or urgent symptoms present.      This was a 25-minute face-to-face encounter.

## 2018-08-05 ENCOUNTER — HEALTH MAINTENANCE LETTER (OUTPATIENT)
Age: 28
End: 2018-08-05

## 2018-08-29 ENCOUNTER — CARE COORDINATION (OUTPATIENT)
Dept: PSYCHIATRY | Facility: CLINIC | Age: 28
End: 2018-08-29

## 2018-08-29 NOTE — PROGRESS NOTES
-Writer received incoming phone call from pt at [phone]    -According to the pt, she emailed the provider disability forms for school twice, but has not received a response. Writer asked that she send these forms to the intake email as the forms will then be forwarded to the writer. Pt agreed to do this. She would like frequent updates regarding the forms at the number above. Writer agreed to do so.

## 2018-08-30 ENCOUNTER — TELEPHONE (OUTPATIENT)
Dept: PSYCHIATRY | Facility: CLINIC | Age: 28
End: 2018-08-30

## 2018-09-05 NOTE — PROGRESS NOTES
-Writer received school forms via email. Called pt and LVM informing her that writer received these forms. Asked for a c/b if she has additional questions or concerns.     -Writer partially completed forms and placed in provider's folder for completion. Message routed to provider.

## 2018-09-07 ENCOUNTER — TELEPHONE (OUTPATIENT)
Dept: PSYCHIATRY | Facility: CLINIC | Age: 28
End: 2018-09-07

## 2018-09-07 NOTE — PROGRESS NOTES
-Writer received completed forms from the provider. Faxed to [facility] at [phone]    -Called pt at the number in the first message and informed her that forms have been sent. Pt voiced appreciation.

## 2018-09-20 DIAGNOSIS — F33.2 SEVERE EPISODE OF RECURRENT MAJOR DEPRESSIVE DISORDER, WITHOUT PSYCHOTIC FEATURES (H): ICD-10-CM

## 2018-09-23 RX ORDER — LAMOTRIGINE 200 MG/1
400 TABLET ORAL DAILY
Qty: 180 TABLET | Refills: 0 | OUTPATIENT
Start: 2018-09-23

## 2018-10-11 ENCOUNTER — OFFICE VISIT (OUTPATIENT)
Dept: PSYCHIATRY | Facility: CLINIC | Age: 28
End: 2018-10-11
Attending: PSYCHIATRY & NEUROLOGY
Payer: COMMERCIAL

## 2018-10-11 DIAGNOSIS — F90.0 ATTENTION DEFICIT HYPERACTIVITY DISORDER (ADHD), PREDOMINANTLY INATTENTIVE TYPE: ICD-10-CM

## 2018-10-11 DIAGNOSIS — F33.1 MODERATE EPISODE OF RECURRENT MAJOR DEPRESSIVE DISORDER (H): ICD-10-CM

## 2018-10-11 DIAGNOSIS — F33.2 MAJOR DEPRESSIVE DISORDER, RECURRENT, SEVERE WITHOUT PSYCHOTIC FEATURES (H): ICD-10-CM

## 2018-10-11 DIAGNOSIS — F33.2 SEVERE EPISODE OF RECURRENT MAJOR DEPRESSIVE DISORDER, WITHOUT PSYCHOTIC FEATURES (H): ICD-10-CM

## 2018-10-11 DIAGNOSIS — F33.1 MAJOR DEPRESSIVE DISORDER, RECURRENT EPISODE, MODERATE (H): ICD-10-CM

## 2018-10-11 RX ORDER — LISDEXAMFETAMINE DIMESYLATE 70 MG/1
140 CAPSULE ORAL 2 TIMES DAILY
Qty: 120 CAPSULE | Refills: 0 | Status: SHIPPED | OUTPATIENT
Start: 2018-10-11 | End: 2018-10-11

## 2018-10-11 RX ORDER — ARIPIPRAZOLE 10 MG/1
10 TABLET ORAL DAILY
Qty: 90 TABLET | Refills: 0 | Status: SHIPPED | OUTPATIENT
Start: 2018-10-11 | End: 2019-01-17

## 2018-10-11 RX ORDER — LISDEXAMFETAMINE DIMESYLATE 70 MG/1
140 CAPSULE ORAL 2 TIMES DAILY
Qty: 120 CAPSULE | Refills: 0 | Status: SHIPPED | OUTPATIENT
Start: 2018-10-11 | End: 2019-01-17

## 2018-10-11 RX ORDER — BUPROPION HYDROCHLORIDE 150 MG/1
300 TABLET ORAL EVERY MORNING
Qty: 180 TABLET | Refills: 0 | Status: SHIPPED | OUTPATIENT
Start: 2018-10-11 | End: 2019-01-08

## 2018-10-11 RX ORDER — FLUOXETINE 40 MG/1
80 CAPSULE ORAL DAILY
Qty: 180 CAPSULE | Refills: 0 | Status: SHIPPED | OUTPATIENT
Start: 2018-10-11 | End: 2019-01-17

## 2018-10-11 RX ORDER — DEXTROAMPHETAMINE SULFATE 10 MG/1
10 TABLET ORAL
Qty: 60 TABLET | Refills: 0 | Status: SHIPPED | OUTPATIENT
Start: 2018-10-11 | End: 2019-01-17

## 2018-10-11 RX ORDER — LAMOTRIGINE 200 MG/1
400 TABLET ORAL DAILY
Qty: 180 TABLET | Refills: 0 | Status: SHIPPED | OUTPATIENT
Start: 2018-10-11 | End: 2019-01-07

## 2018-10-11 RX ORDER — GABAPENTIN 100 MG/1
100 CAPSULE ORAL 4 TIMES DAILY PRN
Qty: 120 CAPSULE | Refills: 1 | Status: SHIPPED | OUTPATIENT
Start: 2018-10-11 | End: 2019-06-27

## 2018-10-11 RX ORDER — DEXTROAMPHETAMINE SULFATE 10 MG/1
10 TABLET ORAL
Qty: 60 TABLET | Refills: 0 | Status: SHIPPED | OUTPATIENT
Start: 2018-10-11 | End: 2018-10-11

## 2018-10-11 NOTE — MR AVS SNAPSHOT
After Visit Summary   10/11/2018    Lexus Uribe    MRN: [id number]           Patient Information     Date Of Birth          1990        Visit Information        Provider Department      10/11/2018 4:30 PM Bret Robledo MD Psychiatry Clinic        Today's Diagnoses     Major depressive disorder, recurrent, severe without psychotic features (H)        Major depressive disorder, recurrent episode, moderate (H)        Attention deficit hyperactivity disorder (ADHD), predominantly inattentive type        Moderate episode of recurrent major depressive disorder (H)        Severe episode of recurrent major depressive disorder, without psychotic features (H)           Follow-ups after your visit        Your next 10 appointments already scheduled     Jan 17, 2019  4:00 PM [facility]   Adult Med Follow UP with Bret Robledo MD   Psychiatry Clinic ([facility])    [place] [id number] [address] [phone]              Who to contact     Please call your clinic at [phone] to:    Ask questions about your health    Make or cancel appointments    Discuss your medicines    Learn about your test results    Speak to your doctor            Additional Information About Your Visit        [employer] Information     [employer] gives you secure access to your electronic health record. If you see a primary care provider, you can also send messages to your care team and make appointments. If you have questions, please call your primary care clinic.  If you do not have a primary care provider, please call [phone] and they will assist you.      [employer] is an electronic gateway that provides easy, online access to your medical records. With [employer], you can request a clinic appointment, read your test results, renew a prescription or communicate with your care team.     To access your existing account, please contact your [facility]  Clinic or call [phone] for assistance.        Care EveryWhere ID     This is your Care EveryWhere ID. This could be used by other organizations to access your [facility] medical records  [id number]         Blood Pressure from Last 3 Encounters:   04/18/16 (!) 165/109   03/28/16 (!) 170/123   09/21/13 (!) 176/108    Weight from Last 3 Encounters:   08/24/16 (!) 191.1 kg (421 lb 3.2 oz)   07/26/16 (!) 188.8 kg (416 lb 3.2 oz)   06/03/16 (!) 181.4 kg (400 lb)              Today, you had the following     No orders found for display         Today's Medication Changes          These changes are accurate as of 10/11/18 11:59 PM.  If you have any questions, ask your nurse or doctor.               Start taking these medicines.        Dose/Directions    dextroamphetamine 10 MG tablet   Commonly known as:  DEXTROSTAT   Used for:  Attention deficit hyperactivity disorder (ADHD), predominantly inattentive type        Dose:  10 mg   Take 1 tablet (10 mg) by mouth 2 times daily   Quantity:  60 tablet   Refills:  0       gabapentin 100 MG capsule   Commonly known as:  NEURONTIN   Used for:  Moderate episode of recurrent major depressive disorder (H)        Dose:  100 mg   Take 1 capsule (100 mg) by mouth 4 times daily as needed (Anxiety)   Quantity:  120 capsule   Refills:  1       lisdexamfetamine 70 MG capsule   Commonly known as:  VYVANSE   Used for:  Attention deficit hyperactivity disorder (ADHD), predominantly inattentive type        Dose:  140 mg   Take 2 capsules (140 mg) by mouth 2 times daily   Quantity:  120 capsule   Refills:  0            Where to get your medicines      These medications were sent to [facility]/pharmacy #[id number] - [place], [place] - [address]  [address] [address]     Phone:  [phone]     ARIPiprazole 10 MG tablet    buPROPion 150 MG 24 hr tablet    FLUoxetine 40 MG capsule    gabapentin 100 MG capsule    lamoTRIgine 200 MG tablet         Some of these will need a  paper prescription and others can be bought over the counter.  Ask your nurse if you have questions.     Bring a paper prescription for each of these medications     dextroamphetamine 10 MG tablet    lisdexamfetamine 70 MG capsule                Primary Care Provider Office Phone # Fax #    Renetta Shafer [phone]       [facility] [address] [place] [address]        Equal Access to Services     DANIA HECK : Hadii aad ku hadasho Soomaali, waaxda luqadaha, qaybta kaalmada adeegyada, waxay idiin hayaan adeeg khramyash lajt . So [facility] [phone].    ATENCIÓN: Si habla nick, tiene a amador disposición servicios gratuitos de asistencia lingüística. [facility] [phone].    We comply with applicable federal civil rights laws and [place] laws. We do not discriminate on the basis of race, color, national origin, age, disability, sex, sexual orientation, or gender identity.            Thank you!     Thank you for choosing PSYCHIATRY CLINIC  for your care. Our goal is always to provide you with excellent care. Hearing back from our patients is one way we can continue to improve our services. Please take a few minutes to complete the written survey that you may receive in the mail after your visit with us. Thank you!             Your Updated Medication List - Protect others around you: Learn how to safely use, store and throw away your medicines at www.disposemymeds.org.          This list is accurate as of 10/11/18 11:59 PM.  Always use your most recent med list.                   Brand Name Dispense Instructions for use Diagnosis    ARIPiprazole 10 MG tablet    ABILIFY    90 tablet    Take 1 tablet (10 mg) by mouth daily    Major depressive disorder, recurrent, severe without psychotic features (H)       buPROPion 150 MG 24 hr tablet    WELLBUTRIN XL    180 tablet    Take 2 tablets (300 mg) by mouth every morning    Major depressive disorder, recurrent episode, moderate (H)        dextroamphetamine 10 MG tablet    DEXTROSTAT    60 tablet    Take 1 tablet (10 mg) by mouth 2 times daily    Attention deficit hyperactivity disorder (ADHD), predominantly inattentive type       FLUoxetine 40 MG capsule    PROzac    180 capsule    Take 2 capsules (80 mg) by mouth daily    Moderate episode of recurrent major depressive disorder (H)       gabapentin 100 MG capsule    NEURONTIN    120 capsule    Take 1 capsule (100 mg) by mouth 4 times daily as needed (Anxiety)    Moderate episode of recurrent major depressive disorder (H)       lamoTRIgine 200 MG tablet    LaMICtal    180 tablet    Take 2 tablets (400 mg) by mouth daily    Severe episode of recurrent major depressive disorder, without psychotic features (H)       lisdexamfetamine 70 MG capsule    VYVANSE    120 capsule    Take 2 capsules (140 mg) by mouth 2 times daily    Attention deficit hyperactivity disorder (ADHD), predominantly inattentive type       * LOSARTAN POTASSIUM PO      Take 25 mg by mouth daily        * losartan 25 MG tablet    COZAAR    1 tablet    Take 1 tablet (25 mg) by mouth daily    Benign essential hypertension       OMEPRAZOLE PO      Take 40 mg by mouth every morning        omeprazole-sodium bicarbonate  MG per capsule    ZEGERID     1 capsule        ZANTAC PO      Take 300 mg by mouth At Bedtime        * Notice:  This list has 2 medication(s) that are the same as other medications prescribed for you. Read the directions carefully, and ask your doctor or other care provider to review them with you.

## 2018-10-12 ENCOUNTER — TELEPHONE (OUTPATIENT)
Dept: PSYCHIATRY | Facility: CLINIC | Age: 28
End: 2018-10-12

## 2018-10-13 NOTE — TELEPHONE ENCOUNTER
Brief Psychiatry Telephone Note    Phone call time: 8:45pm   Clinic Provider: Dr. Robledo    S:  Lexus reports that she saw Dr. Robledo in clinic today and had her medications refilled and received a new medication, Gabapentin. When she went to her [facility] [address], none of the medications were there. She states that she has paper prescriptions for the dextroamphetamine and Vyvanse. On chart review, several medications were ordered today and were sent to [facility]. Lexus denies any SI or HI.     A/P:  Lexus Uribe is a 28 year old woman with a history of MDD and ADD who calls requesting medication refills. Medication refills were confirmed in her records with identical orders from Dr. Robledo called in to [facility]. The following medications called in to a 24 hour [facility] at [address].    Ordered:  - Abilify 10mg daily, 90 tablets  - Wellbutrin XL 150mg, take 2 tablets (300mg) daily, 180 tablets  - Prozac 40mg, take 2 capsules (80mg) daily, 180 capsules  - Gabapentin 100mg QID prn anxiety, 120 capsules, 1 refill  - Lamictal 200mg tablets, take 2 tablets (400mg) daily, 180 tablets    Patient is not currently suicidal or homicidal. she is aware to call 911 or go to the nearest ER if safety concern or urgent symptoms arise.     Delia Elkins MD  Psychiatry Resident

## 2018-11-07 NOTE — PROGRESS NOTES
"Service Date: 10/11/2018      PSYCHIATRY CLINIC PROGRESS NOTE      The patient returns for medication management and supportive therapy.      Interim History   Lexus is in school now at [employer]. She just moved. She is still adapting to bariatric surgery. She is very anxious. Her mind goes blank when she tries to study.      RECENT SYMPTOMS   Depression:   The patient's mood varies. There is some middle awakening, hypersomnia on weekends, and appetite change since her bariatric surgery.      Elevated mood:   The patient denies symptoms of maryann.       Psychosis:   The patient denies symptoms of psychosis.      Panic Attacks:   The patient endorses a panic attack last Monday.      Anxiety:   The patient endorses anxiety, feelings of fearfulness, nervousness, tension, restlessness, and feeling overwhelmed.      Trauma-related:   The patient denies symptoms related to trauma.      ADVERSE EFFECTS:   * \"Bouncing\" leg   * Dry mouth   * Dry eyes      MEDICAL CONCERNS:   * S/P gastric sleeve   * Lost 60 lbs      SUBSTANCE USE:   The patient has not used alcohol for a long time and denies use of other drug use      SOCIAL/FAMILY HISTORY:   See interim history      MEDICAL/SURGICAL HISTORY:   See EMR medical problems list.      MEDICAL REVIEW OF SYSTEMS:   A comprehensive review of systems was performed and is negative other than as noted in the HPI.      ALLERGY:   None new      CURRENT MEDICATIONS:   See EMR med sections.      VITALS:   See rooming note.      MENTAL STATUS EXAM:   On mental status examination today, the patient was alert, well groomed, and cooperative with the interviewer. Speech was normal rate and flow. Movements, gait, and station were normal. Mood was positive and affect was congruent. Thought processes were logical and devoid of suicidal ideation or psychosis. Insight and judgment were good. Patient was oriented x4 with adequate attention and concentration, intact memory, and good fund of knowledge.    "   Labs and Data:         DIAGNOSIS AND ASSESSMENT:   1. MDD, recurrent, in partial remission   2. Anxiety is interfering with school      PLAN:   1. Medications:   a. Gabapentin 100 TID   2. Therapy: Yes   3. RTC in eight weeks   4. Other:   5. Crisis numbers: provided routinely in AVS.      Treatment Risk Statement: The patient understands the risks, benefits, adverse effects, and alternatives. Agrees to treatment with the capacity to do so. No medical contraindications to treatment. Agrees to call clinic for any problems. The patient understands to call 911 or come to the nearest ED is life-threatening or urgent symptoms present.         MARVEL ZAVALA MD             D: 2018   T: 2018   MT: PRABHA      Name:     ERICK ORELLANA   MRN:      [id number]        Account:      [id number]   :      1990           Service Date: 10/11/2018      Document: [id number]

## 2018-12-28 ENCOUNTER — TELEPHONE (OUTPATIENT)
Dept: PSYCHIATRY | Facility: CLINIC | Age: 28
End: 2018-12-28

## 2018-12-28 NOTE — TELEPHONE ENCOUNTER
- Writer received incoming call from patient     Patient reports unable to complete an assignment for a class that determines a chunk of her final grade and is receiving an incomplete in her class. Patient at this time is requesting a letter stating her diagnosis. Requesting the letter to be completed today and emailed to [email or web address]. Writer agreed to pass this to provider for approval.

## 2018-12-28 NOTE — TELEPHONE ENCOUNTER
Bret Robledo MD   You 3 hours ago (12:46 PM)      I am waiting for it to be typed.  It should be going out today. (Routing comment)        - No further action needed by this writer

## 2019-01-03 NOTE — TELEPHONE ENCOUNTER
- Writer received incoming call from patient    Patient inquiring on the status of the letter for school. Writer agreed to reach out to the provider for an update.

## 2019-01-07 DIAGNOSIS — F33.2 SEVERE EPISODE OF RECURRENT MAJOR DEPRESSIVE DISORDER, WITHOUT PSYCHOTIC FEATURES (H): ICD-10-CM

## 2019-01-07 RX ORDER — LAMOTRIGINE 200 MG/1
400 TABLET ORAL DAILY
Qty: 60 TABLET | Refills: 0 | Status: SHIPPED | OUTPATIENT
Start: 2019-01-07 | End: 2019-02-04

## 2019-01-07 NOTE — TELEPHONE ENCOUNTER
Last Seen 10/11/18  RTC 8 weeks  Cancel None  No-Show None    Next Appt 1/17/19    Incoming Refill From [employer] via fax    Medication Requested lamoTRIgine (LAMICTAL) 200 MG tablet    Directions Take 2 tablets (400 mg) by mouth daily    Qty 180    Last Refill 12/18/18      30 d/s pended and routed to Dr. Robledo for approval due to out of RTC time frame

## 2019-01-08 DIAGNOSIS — F33.1 MAJOR DEPRESSIVE DISORDER, RECURRENT EPISODE, MODERATE (H): ICD-10-CM

## 2019-01-08 RX ORDER — BUPROPION HYDROCHLORIDE 150 MG/1
300 TABLET ORAL EVERY MORNING
Qty: 60 TABLET | Refills: 0 | Status: SHIPPED | OUTPATIENT
Start: 2019-01-08 | End: 2019-01-17

## 2019-01-08 NOTE — TELEPHONE ENCOUNTER
Last Seen 10/11/18  RTC 8 weeks  Cancel None  No-Show None    Next Appt 1/17/19    [facility]    Medication Requested buPROPion (WELLBUTRIN XL) 150 MG 24 hr tablet    Directions Take 2 tablets (300 mg) by mouth every morning    Qty 60    Last Refill 12/19/18      30 d/s pended and routed to Dr. Robledo for approval due to out of RTC time frame

## 2019-01-17 ENCOUNTER — OFFICE VISIT (OUTPATIENT)
Dept: PSYCHIATRY | Facility: CLINIC | Age: 29
End: 2019-01-17
Attending: PSYCHIATRY & NEUROLOGY
Payer: COMMERCIAL

## 2019-01-17 DIAGNOSIS — F33.1 MODERATE EPISODE OF RECURRENT MAJOR DEPRESSIVE DISORDER (H): ICD-10-CM

## 2019-01-17 DIAGNOSIS — F33.1 MAJOR DEPRESSIVE DISORDER, RECURRENT EPISODE, MODERATE (H): ICD-10-CM

## 2019-01-17 DIAGNOSIS — F33.2 MAJOR DEPRESSIVE DISORDER, RECURRENT, SEVERE WITHOUT PSYCHOTIC FEATURES (H): ICD-10-CM

## 2019-01-17 DIAGNOSIS — F90.0 ATTENTION DEFICIT HYPERACTIVITY DISORDER (ADHD), PREDOMINANTLY INATTENTIVE TYPE: ICD-10-CM

## 2019-01-17 RX ORDER — DEXTROAMPHETAMINE SULFATE 10 MG/1
10 TABLET ORAL
Qty: 60 TABLET | Refills: 0 | Status: SHIPPED | OUTPATIENT
Start: 2019-01-17 | End: 2019-01-17

## 2019-01-17 RX ORDER — FLUOXETINE 40 MG/1
80 CAPSULE ORAL DAILY
Qty: 180 CAPSULE | Refills: 0 | Status: SHIPPED | OUTPATIENT
Start: 2019-01-17 | End: 2019-07-26

## 2019-01-17 RX ORDER — LISDEXAMFETAMINE DIMESYLATE 70 MG/1
140 CAPSULE ORAL 2 TIMES DAILY
Qty: 120 CAPSULE | Refills: 0 | Status: SHIPPED | OUTPATIENT
Start: 2019-01-17 | End: 2019-04-04

## 2019-01-17 RX ORDER — ARIPIPRAZOLE 10 MG/1
10 TABLET ORAL DAILY
Qty: 90 TABLET | Refills: 0 | Status: SHIPPED | OUTPATIENT
Start: 2019-01-17 | End: 2019-06-27

## 2019-01-17 RX ORDER — BUPROPION HYDROCHLORIDE 150 MG/1
300 TABLET ORAL EVERY MORNING
Qty: 180 TABLET | Refills: 0 | Status: SHIPPED | OUTPATIENT
Start: 2019-01-17 | End: 2019-04-04

## 2019-01-17 RX ORDER — LISDEXAMFETAMINE DIMESYLATE 70 MG/1
140 CAPSULE ORAL 2 TIMES DAILY
Qty: 120 CAPSULE | Refills: 0 | Status: SHIPPED | OUTPATIENT
Start: 2019-01-17 | End: 2019-01-17

## 2019-01-17 RX ORDER — DEXTROAMPHETAMINE SULFATE 10 MG/1
10 TABLET ORAL
Qty: 60 TABLET | Refills: 0 | Status: SHIPPED | OUTPATIENT
Start: 2019-01-17 | End: 2019-04-04

## 2019-01-17 NOTE — NURSING NOTE
Chief Complaint   Patient presents with     Recheck Medication     Major depressive disorder, recurrent, severe without psychotic features

## 2019-02-04 DIAGNOSIS — F33.2 SEVERE EPISODE OF RECURRENT MAJOR DEPRESSIVE DISORDER, WITHOUT PSYCHOTIC FEATURES (H): ICD-10-CM

## 2019-02-05 NOTE — TELEPHONE ENCOUNTER
Medication requested: lamoTRIgine (LAMICTAL) 200 MG tablet  Last refilled: 1/20/18  Qty: 60      Last seen: 1/17/19  RTC: ? Note not completed  Cancel: 0  No-show: 0  Next appt: 4/4/19    Refill decision:   Refill pended and routed to the provider for review/determination due to last note not completed   Making sure pt is to continue on Lamictal at same dose

## 2019-02-06 RX ORDER — LAMOTRIGINE 200 MG/1
TABLET ORAL
Qty: 60 TABLET | Refills: 1 | Status: SHIPPED | OUTPATIENT
Start: 2019-02-06 | End: 2019-04-09

## 2019-02-13 NOTE — PROGRESS NOTES
Service Date: 01/17/2019      PSYCHIATRY CLINIC PROGRESS NOTE      The patient returns for medication management and supportive therapy.      Interim History   Since the last visit the patient has resumed drinking. The end of the semester was stressful and she got anxious about not being able to write her papers. She has gained 20 lbs since July. Her eating got out of control. She plans to stop using alcohol.      RECENT SYMPTOMS   Depression:   The patient endorses a somewhat depressed mood, anhedonia, apathy, variable sleep (but she spent most of the winter break sleeping), and bingeing.       Elevated mood:   The patient denies symptoms of maryann.      Psychosis:   The patient denies symptoms of psychosis.      Panic Attacks:   The patient endorses possible panic attacks when writing papers.      Anxiety:   The patient endorses high levels of anxiety, with excessive worries, feelings of fearfulness, hair pulling, and restlessness.      Trauma-related:   The patient denies symptoms related to trauma.       ADVERSE EFFECTS:   1. Dry mouth   2. Dry eyes      MEDICAL CONCERNS:   1. Status post bariatric surgery   2. Weight gain      SUBSTANCE USE:   The patient endorses recent use of alcohol up to 6 drinks daily      SOCIAL/FAMILY HISTORY:   Unchanged from the last visit.      MEDICAL/SURGICAL HISTORY:   See EMR medical problems list.      MEDICAL REVIEW OF SYSTEMS:   A comprehensive review of systems was performed and is negative other than as noted in the HPI.      ALLERGY:   None.      CURRENT MEDICATIONS:   See EMR med sections.      VITALS:   See rooming note.      MENTAL STATUS EXAM:   On mental status examination, patient was alert, adequately groomed, and cooperative with the interview. Speech was normal rate and flow. Movements, gait, and station were normal. Mood was reactive but anxious and affect was congruent. Thought processes were logical and devoid of suicidal ideation or psychosis. Insight and  judgment were limited Patient was oriented x4 with OK attention and concentration, intact memory, and OK fund of knowledge.       Labs and Data:         DIAGNOSIS AND ASSESSMENT:   1. MDD, recurrent   2. ADD   3. Alcohol use disorder      PLAN:   1. Medications:   a. NAC for hair pulling   b. If the patient has another alcohol relapse, we will refer her for a formal CD evaluation.   2. Therapy: Individual   3. RTC in 1 month   4. Other:   5. Crisis numbers: provided routinely in AVS.      Treatment Risk Statement: The patient understands the risks, benefits, adverse effects, and alternatives. Agrees to treatment with the capacity to do so. No medical contraindications to treatment. Agrees to call clinic for any problems. The patient understands to call 911 or come to the nearest ED is life-threatening or urgent symptoms present.         MARVEL ZAVALA MD             D: 2019   T: 2019   MT: PRABHA      Name:     ERICK ORELLANA   MRN:      [id number]        Account:      [id number]   :      1990           Service Date: 2019      Document: [id number]

## 2019-02-18 DIAGNOSIS — F33.2 MAJOR DEPRESSIVE DISORDER, RECURRENT, SEVERE WITHOUT PSYCHOTIC FEATURES (H): ICD-10-CM

## 2019-04-04 ENCOUNTER — OFFICE VISIT (OUTPATIENT)
Dept: PSYCHIATRY | Facility: CLINIC | Age: 29
End: 2019-04-04
Attending: PSYCHIATRY & NEUROLOGY
Payer: COMMERCIAL

## 2019-04-04 DIAGNOSIS — F90.0 ATTENTION DEFICIT HYPERACTIVITY DISORDER (ADHD), PREDOMINANTLY INATTENTIVE TYPE: ICD-10-CM

## 2019-04-04 DIAGNOSIS — F33.1 MAJOR DEPRESSIVE DISORDER, RECURRENT EPISODE, MODERATE (H): ICD-10-CM

## 2019-04-04 RX ORDER — LISDEXAMFETAMINE DIMESYLATE 70 MG/1
140 CAPSULE ORAL 2 TIMES DAILY
Qty: 120 CAPSULE | Refills: 0 | Status: SHIPPED | OUTPATIENT
Start: 2019-04-04 | End: 2019-08-01

## 2019-04-04 RX ORDER — DEXTROAMPHETAMINE SULFATE 10 MG/1
10 TABLET ORAL
Qty: 60 TABLET | Refills: 0 | Status: SHIPPED | OUTPATIENT
Start: 2019-04-04 | End: 2019-08-01

## 2019-04-04 RX ORDER — BUPROPION HYDROCHLORIDE 150 MG/1
450 TABLET ORAL EVERY MORNING
Qty: 270 TABLET | Refills: 0 | Status: SHIPPED | OUTPATIENT
Start: 2019-04-04 | End: 2019-06-16

## 2019-04-09 DIAGNOSIS — F33.2 SEVERE EPISODE OF RECURRENT MAJOR DEPRESSIVE DISORDER, WITHOUT PSYCHOTIC FEATURES (H): ICD-10-CM

## 2019-04-11 RX ORDER — LAMOTRIGINE 200 MG/1
TABLET ORAL
Qty: 60 TABLET | Refills: 3 | Status: SHIPPED | OUTPATIENT
Start: 2019-04-11 | End: 2019-06-27

## 2019-04-26 NOTE — PROGRESS NOTES
Service Date: 04/04/2019      PSYCHIATRY CLINIC PROGRESS NOTE      The patient returns for medication management and supportive therapy.      Interim History   The patient is not drinking alone and there has been no alcohol in her apartment for two months. She has an appointment with Mary Ellen Gann for a second opinion. She is tired. She only works eight hours per week.        RECENT SYMPTOMS   Depression:   The patient endorses depressed mood, anhedonia, low energy (uses a CPAP), hypersomnia on the weekends, and having gained back all the weight she lost from her surgery.      Elevated mood:   The patient denies symptoms of maryann.      Psychosis:   The patient denies symptoms of psychosis.      Panic Attacks:   The patient denies panic attacks.      Anxiety:   The patient endorses anxiety 75% of the time, excessive worries, hair pulling, and nervousness.       Trauma-related:   The patient denies symptoms related to trauma.       ADVERSE EFFECTS:   1. Restless leg      MEDICAL CONCERNS:   1. Obesity   2. Obstructive sleep apnea      SUBSTANCE USE:   The patient endorses alcohol use, but see interim history.       SOCIAL/FAMILY HISTORY:   The patient is taking 16 credits this year at school, studying international relations with a minor in Sinhala.       MEDICAL/SURGICAL HISTORY:   See EMR medical problems list.      MEDICAL REVIEW OF SYSTEMS:   A comprehensive review of systems was performed and is negative other than as noted in the HPI.      ALLERGY:   None new.      CURRENT MEDICATIONS:   See EMR med sections. She is not on Tpamax 100 mg for weight loss.      VITALS:   See rooming note.      MENTAL STATUS EXAM:   On mental status examination, the patient was alert, well groomed, and cooperative with the interviewer. Speech was normal rate and flow. Movements, gait, and station were normal. Mood was mildly down with a smiling affect. Thought processes were logical and devoid of suicidal ideation or psychosis. Insight  and judgment were fair. Patient was oriented x4 with adequate attention and concentration, intact memory, and good fund of knowledge.       Labs and Data:         DIAGNOSIS AND ASSESSMENT:   1. MDD, recurrent and mild   2. ADD      PLAN:   1. Medications:   a. Increase Wellbutrin XL to 450 mg   2. Therapy: Individual therapy   3. RTC in two months   4. Other:   5. Crisis numbers: provided routinely in AVS.      Treatment Risk Statement: The patient understands the risks, benefits, adverse effects, and alternatives. Agrees to treatment with the capacity to do so. No medical contraindications to treatment. Agrees to call clinic for any problems. The patient understands to call 911 or come to the nearest ED is life-threatening or urgent symptoms present.         MARVEL ZAVALA MD             D: 2019   T: 2019   MT: PRABHA      Name:     ERICK ORELLANA   MRN:      [id number]        Account:      [id number]   :      1990           Service Date: 2019      Document: [id number]

## 2019-05-02 ENCOUNTER — TELEPHONE (OUTPATIENT)
Dept: PSYCHIATRY | Facility: CLINIC | Age: 29
End: 2019-05-02

## 2019-05-02 NOTE — TELEPHONE ENCOUNTER
"Writer received incoming phone call from pt requesting this writer or provider to send documentation to Rimma Shepard with the [employer] to allow for accommodations at school. Please see the following email sent by the pt:        \"Hi Dr. Robledo,    I'm taking a class at the  this summer and will need documentation for ADD/ADHD, anxiety and depression.  In the past, I've needed to be able to vocalize during tests, extended time, a private testing room and extensions on assignments.    Could you or Delia please send documentation to  Rmima ([email or web address] [email or web address]) or their fax ( [phone] (FAX).)?    If you or Delia could please respond to this confirming you've sent it, that would be great.    Thank you so much,    Lexus Cody\"  "

## 2019-05-02 NOTE — TELEPHONE ENCOUNTER
Per pt, she needs the documentation today. Email was originally sent to provider on 4/26. Writer and fellow RN, Heather Truong were able to receive verbal approval over the phone to send documentation to Rimma. HOMERO completed, labeled, copied and placed in scanning.     To avoid further delay, writer printed documentation from 1/17/19 and 7/31/17, as requested in the email. Faxed to Rimma Shepard at [phone]. Called pt and LVM notifing her of this.

## 2019-05-06 ENCOUNTER — TELEPHONE (OUTPATIENT)
Dept: PSYCHIATRY | Facility: CLINIC | Age: 29
End: 2019-05-06

## 2019-05-06 NOTE — LETTER
"May 7, 2019      RE: Lexus Uribe  [address]  [address]  [place] [address]         To Whom it May Concern,    Lexus Uribe is under my care at the [facility].  I am in the process of adjusting her medications to treat her medical condition. Please allow an \"incomplete\" for her assignments, as she is not currently on the ideal medication regimen. Unfortunately, this has directly affected her academic performance.    If you have any additional questions regarding this letter, please call the clinic at the number listed above.      Sincerely,      Bret Robledo MD  "

## 2019-05-06 NOTE — TELEPHONE ENCOUNTER
-Writer returned call to Lexus, she is requesting a letter to help her request an incomplete from school.  She needs this letter by Friday.  Patient is not seeing Dr. Gann until 5/13/19.  Writer suggested that she call Dr. Robledo's nurse and request that he write the letter, as patient has not been seen by Dr. Gann yet.  She agreed to this and will reach out to Dr. Robledo's nurse.

## 2019-05-06 NOTE — TELEPHONE ENCOUNTER
"Received incoming phone call from pt. The pt reports she has been wrapping up this semester. Overall, she was doing fairly well, until the very end when things \"spun out of control.\" The pt said she's going to petition for an incomplete assignment, as she has run out of time. She went on to say that she feels her medications are not ideal and if they were, this would have been avoided. Shortly after, she apologized and said she realizes this is paritally her fault as well.     The pt would like a letter from the provider requesting an \"incomplete\" for her assignments, as they are changing her medications and trying to find the best medication regimen to treat her diagnoses.     Writer agreed to discuss this with the provider. Will then draft letter if he approves. Letter can be faxed to [phone].   "

## 2019-05-06 NOTE — TELEPHONE ENCOUNTER
[facility] Call Center    Phone Message    May a detailed message be left on voicemail: yes    Reason for Call: Other: Patient would like a call back to discuss     Action Taken: Other: Azra Gloria

## 2019-05-07 NOTE — TELEPHONE ENCOUNTER
Bret Robledo MD Pratt, Laura, RN   Caller: Unspecified (Yesterday,  1:31 PM)             Yes          Writer drafted, printed, and placed letter in provider's folder for review and signature.

## 2019-05-13 ENCOUNTER — OFFICE VISIT (OUTPATIENT)
Dept: PSYCHIATRY | Facility: CLINIC | Age: 29
End: 2019-05-13
Payer: COMMERCIAL

## 2019-05-13 DIAGNOSIS — F33.1 MAJOR DEPRESSIVE DISORDER, RECURRENT EPISODE, MODERATE (H): Primary | ICD-10-CM

## 2019-05-13 RX ORDER — TOPIRAMATE 100 MG/1
TABLET, FILM COATED ORAL
Refills: 1 | COMMUNITY
Start: 2019-04-24 | End: 2019-07-08

## 2019-05-13 ASSESSMENT — PAIN SCALES - GENERAL: PAINLEVEL: NO PAIN (0)

## 2019-05-13 ASSESSMENT — PATIENT HEALTH QUESTIONNAIRE - PHQ9: SUM OF ALL RESPONSES TO PHQ QUESTIONS 1-9: 16
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
## 2019-05-15 DIAGNOSIS — F33.2 MAJOR DEPRESSIVE DISORDER, RECURRENT, SEVERE WITHOUT PSYCHOTIC FEATURES (H): ICD-10-CM

## 2019-05-17 NOTE — TELEPHONE ENCOUNTER
Medication requested:  mg caps  'NAC for hair pulling'    Last refilled: 2/18/19  Qty: 60      Last seen: 5/13/19  RTC: not noted  Cancel: 0  No-show: 0  Next appt: 8/1/19    Refill decision:   Refilled for 30 days per protocol.

## 2019-05-23 ENCOUNTER — OFFICE VISIT (OUTPATIENT)
Dept: PSYCHIATRY | Facility: CLINIC | Age: 29
End: 2019-05-23
Payer: COMMERCIAL

## 2019-05-23 DIAGNOSIS — F50.9 EATING DISORDER: Primary | ICD-10-CM

## 2019-05-31 ENCOUNTER — OFFICE VISIT (OUTPATIENT)
Dept: PSYCHIATRY | Facility: CLINIC | Age: 29
End: 2019-05-31
Payer: COMMERCIAL

## 2019-05-31 DIAGNOSIS — F50.9 EATING DISORDER: Primary | ICD-10-CM

## 2019-05-31 NOTE — PROGRESS NOTES
"Diagnostic Evaluation (Psychology)  Cognitive-Behavioral Psychotherapy (CBT)        Name: Lexus Uribe  MRN#: [id number]  Age: 28 year old  YOB: 1990  Date of Evaluation: May 23, 2019  Duration: 55 minutes (starting time = 10:05 AM; stopping time = 11:00 AM)      Chief Complaint:  Major depression, recurrent, severe      Referral:  Ms. Uribe was referred by Dr. Mary Ellen Gann ([facility]) for an evaluation of eating disorder and mood disorder symptoms. Ms. Uribe also sees Dr. Bret Ramesh in the Department of Psychiatry for medication management and Dr. Genoveva Ahuja at [facility] for individual therapy following bariatric surgery (Dr. Ahuja referred Ms. Uribe to Dr. Gann).      History of Present Illness:  Ms. Uribe reports the onset of psychiatric symptoms in childhood which were characterized by \"extreme temper tantrums\" in which she would become angry, upset, and behaviorally dysregulated, including aggressive behavior towards her sister. She reports that she was first diagnosed with depression at age 10 as well as being diagnosed with ADHD and anxiety. Ms. Uribe reports that her psychiatric symptoms and treatment during childhood and adolescence was \"tumultous\" as she was tried on a number of different medications and saw several therapists.    Ms. Uribe reports that her \"happiest\" time during which she was free from depression was when she was a senior in high school. At that time, she reports feeling engaged in school, sports, choir, and friendships. She felt supported by teachers and that her mother was \"distracted\" by her sister's depression and was less focused on her. This period of remission was followed by a recurrence of symptoms when she went to college in [place]. She reports feeling \"overwhelmed and couldn't function\" because of depression and anxiety related to the unfamiliarity of college. She left mid-semester and moved back home, where her depression persisted " "because she \"felt like a failure.\" In 2009, she had sinus surgery,  attended [employer] school, and was able to move out on her own. During that time, she also attended an inpatient MI/CD program (although she was not using drugs or alcohol at that time) which she found very helpful. In 2012, she was working in [employer] and started to see Dr. Ramesh for medication. Ms. Uribe reports that her depression was most sever in 2017. She attended the intensive program at [facility] because of increasingly suicidality, although she had no plan or intent.    Ms. Uribe states that her depression has also been severe during these past two years. 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 She reports that she \"self-sabotaged\" her position in the company, felt unworthy and anxiety, and gained 80 lbs. After she was fired and was not working, she \"fell apart.\" However, she then returned to community college where she was able to complete a math course. She now attends college at [employer] and will graduate in another year. She plans to study internal relations in [place] next spring.    Ms. Uribe had gastric bypass surgery (sleeve gastrectomy) in February, 2018. She reports a long-standing pattern of overeating, some of which meets criteria for binge eating (consumption of large amounts of food with a sense of loss of control) and other episodes are primarily emotion-based that she describes as \"self-soothing\" emotional eating. She reports that she currently exercises (30 minutes of cardio) on a regular basis. Ms. Uribe describes that she is \"slipping\" with her food choices since the surgery. She reports a history of purging in during childhood but not currently. Ms. Uribe reports that her mood and self-evaluation are strongly influenced by her weight and shape. Ms. Uribe attended [facility] for treatment of binge eating disorder in the past.    Ms. Uribe has a history of alcohol misuse " "(see Dr. Gann' summary, 5/13/19) at three times in her life. During those periods she was drinking as much as 1.75L of hard alcohol each day. This drinking pattern occurred most recently in January, 2019. She then cut back to 7 drinks per week and is not drinking currently.    Current Symptoms:  Persistent dysphoria, sleep disturbance (hypersomnia), anxiety,  ruminative thoughts, concentration impairment, low motivation, low energy, binge eating episodes, preoccupation with eating, and overvaluation of shape/weight. She denies suicidal ideation (reports none since 2016).      Psychosocial History:  Ms. Uribe was born [place] raised in the [place] of [place]. She has one sister who has been diagnosed with bipolar disorder. She reports that her mother is focused on \"saving\" her, and that Ms. Uribe has been working towards being more independent. Ms. Uribe has a fewfriends (on-line, primarily) and is looking forward to working and volunteering this summer once her college semester is finished. She currently lives alone with her two cats, to whom she is very attached.     Mental Status Exam:  Appearance: appropriate  Attitude: cooperative  Behavior: normal  Eye Contact: normal  Speech: normal  Mood: anxious  Affect: mood congruent  Thought Process: clear  Suicidal Ideation: denies past or current suicidal ideation or psychotic symptoms  Hallucinations: none      Medications:  Current Outpatient Medications   Medication     acetylcysteine () 600 MG CAPS capsule     ARIPiprazole (ABILIFY) 10 MG tablet     buPROPion (WELLBUTRIN XL) 150 MG 24 hr tablet     dextroamphetamine (DEXTROSTAT) 10 MG tablet     FLUoxetine (PROZAC) 40 MG capsule     gabapentin (NEURONTIN) 100 MG capsule     lamoTRIgine (LAMICTAL) 200 MG tablet     lisdexamfetamine (VYVANSE) 70 MG capsule     losartan (COZAAR) 25 MG tablet     LOSARTAN POTASSIUM PO     OMEPRAZOLE PO     omeprazole-sodium bicarbonate (ZEGERID)  MG per capsule     " Ranitidine HCl (ZANTAC PO)     topiramate (TOPAMAX) 100 MG tablet     No current facility-administered medications for this visit.      Diagnosis:  Major depression, recurrent, severe, without psychotic symptoms F33.2  Eating disorder (current binge eating disorder, past bulimia nervosa) F50.81  ADHD, F90.0  Alcohol abuse, currently in early remission, F10.11     Recommendation/Plan:  We discussed Ms. Uribe's current goals to reduce overeating/binge eating episodes and agreed to meet for several follow-up CBT sessions. Provided self-monitoring homework and agreed to schedule a follow up session in one week.      Signed:  Anayeli Andrade, Ph.D., L.P.    Department of Psychiatry

## 2019-06-09 DIAGNOSIS — F33.2 MAJOR DEPRESSIVE DISORDER, RECURRENT, SEVERE WITHOUT PSYCHOTIC FEATURES (H): ICD-10-CM

## 2019-06-11 NOTE — TELEPHONE ENCOUNTER
Medication requested:  MG CAPS  Last refilled: 5/17/19  Qty: 60      Last seen: 4/4/19  RTC: 2 MONTHS  Cancel: 0  No-show: 0  Next appt: 8/1/19    Refill decision:   Refilled for 30 days per protocol.

## 2019-06-13 NOTE — PROGRESS NOTES
Behavioral Activation Psychotherapy Progress Note       [facility]  [address], [address]  [place] [address]       Name: Lexus Uribe  MRN#: [id number]  Age: 28 year old  YOB: 1990  Date: May 31, 2019  Duration: 55 minutes (starting time = 1:05 PM; stopping time = 2:00 PM)      Content of Psychotherapy Session:  Discussed self-monitoring homework and eating patterns, particularly behaviors in response to cognitions and beliefs. Reviewed developmental origins as well as precipitants of eating behavior and links to emotion. Discussed CBT strategies including behavioral discomfirmation and patterning of meals/snacks to prevent overeating.     Current Symptoms  Intermittent dysphoria, self-criticism, guilt, dysphoria, anxiety, worry, ruminative thoughts, binge eating, and overvaluation of shape/weight. Ms. Uribe denies suicidal ideation.      Mental Status Exam:  Appearance: appropriate  Behavior: normal  Eye contact: normal  Speech: normal  Mood: euthymic  Affect: mood congruent  Thought Process: clear  Suicidal Ideation: denies  Hallucinations: none     Diagnosis  Major depression, recurrent, severe, without psychotic symptoms F33.2  Eating disorder (current binge eating disorder, past bulimia nervosa) F50.81  ADHD, F90.0  Alcohol abuse, currently in early remission, F10.11     Plan  Continue CBT sessions with a focus binge eating and overvaluation of shape/weight as well as depression.     Signed:  Anayeli Andrade, Ph.D.    Department of Psychiatry

## 2019-06-16 DIAGNOSIS — F33.1 MAJOR DEPRESSIVE DISORDER, RECURRENT EPISODE, MODERATE (H): ICD-10-CM

## 2019-06-17 ENCOUNTER — TELEPHONE (OUTPATIENT)
Dept: PSYCHIATRY | Facility: CLINIC | Age: 29
End: 2019-06-17

## 2019-06-17 NOTE — TELEPHONE ENCOUNTER
Prior Authorization Retail Medication Request  RENEWAL  Medication/Dose: lisdexamfetamine (VYVANSE) 70 MG capsule- Take 2 capsules (140 mg) by mouth 2 times daily  ICD code (if different than what is on RX):  F90.0 (Attention deficit hyperactivity disorder (ADHD), predominantly inattentive type)  Previously Tried and Failed:  Please see previous PA approval from encounter on 5/25/18  Rationale:      Insurance Name:  [employer]  Insurance ID:  [id number]      Pharmacy Information (if different than what is on RX)  Name:    Phone:

## 2019-06-17 NOTE — TELEPHONE ENCOUNTER
Patient called to see if there is an update regarding the prior authorization. She stated she has two days left of the medication. Patient would like a phone call with an update ASAP. She can be reached at [phone].

## 2019-06-18 NOTE — TELEPHONE ENCOUNTER
Patient returned a call from Delia. Patient is hoping the process can be expedited because she is almost out of the medication. If needed, she can be reached at [phone]. A detailed message is ok.

## 2019-06-18 NOTE — TELEPHONE ENCOUNTER
Jaki Hernandez [facility] Psychiat[facility] Cc: Delia Wilson, RN   Phone Number: [phone]             Pt needs prior auth on vivance     Requests delia gives her a call about it as well at [phone]          Writer called pt. No answer. LVM providing update. Appears another nurse started the renewal yesterday afternoon. Will notify PA team that pt is requesting this is completed urgently.

## 2019-06-18 NOTE — TELEPHONE ENCOUNTER
Central Prior Authorization Team   Phone: [phone]    PA Initiation    Medication: lisdexamfetamine (VYVANSE) 70 MG capsule  Insurance Company: [employer] - Phone [phone] Fax [phone]  Pharmacy Filling the Rx: [facility] [address] [place] [address] AT [facility]  Filling Pharmacy Phone: [phone]  Filling Pharmacy Fax:    Start Date: 6/18/2019      approved

## 2019-06-18 NOTE — TELEPHONE ENCOUNTER
Prior Authorization Approval    Authorization Effective Date: 5/19/2019  Authorization Expiration Date: 6/17/2020  Medication: lisdexamfetamine (VYVANSE) 70 MG capsule - approved  Approved Dose/Quantity:   Reference #: [id number]   Insurance Company: [employer] - Phone [phone] Fax [phone]  Expected CoPay: n/a     CoPay Card Available:      Foundation Assistance Needed:    Which Pharmacy is filling the prescription (Not needed for infusion/clinic administered): [facility] [address] [place] [address] AT [facility]  Pharmacy Notified: Yes  Patient Notified: Yes

## 2019-06-20 RX ORDER — BUPROPION HYDROCHLORIDE 150 MG/1
450 TABLET ORAL EVERY MORNING
Qty: 270 TABLET | Refills: 0 | Status: SHIPPED | OUTPATIENT
Start: 2019-06-20 | End: 2019-08-01

## 2019-06-20 NOTE — TELEPHONE ENCOUNTER
"Azra Gloria RN Pratt, Laura, RN   Caller: Unspecified (4 days ago,  3:23 PM)             Dr. Sanjuanita Lester is not managing her medications, and did not alert anything and referred her back to Dr. Robledo to manage things are [place].  Can you take care of this refill? Or do you want me to handle it?     Melissa          Last seen: 4/4/19 (Venkat) and 5/13/19 (Sanjuanita)  RTC: Per Dr. Gann' note, \"on 5/23/19 for appointment with Dr. Andrade, after than PRN  Next appointment with Dr. Robledo is 8/1/19\"  Cancel: none  No-show: none  Next appt: 8/1/19     Medication requested: buPROPion (WELLBUTRIN XL) 150 MG 24 hr tablet  Directions: Take 3 tablets (450 mg) by mouth every morning   Qty: 270  Last refilled: approximately 4/4/19 per med tab      Medication refill approved per refill protocol  90 d/s with 0 refills sent to pt's preferred pharmacy (insurance prefers 90 d/s).  "

## 2019-06-24 DIAGNOSIS — F33.2 MAJOR DEPRESSIVE DISORDER, RECURRENT, SEVERE WITHOUT PSYCHOTIC FEATURES (H): ICD-10-CM

## 2019-06-24 DIAGNOSIS — F33.2 SEVERE EPISODE OF RECURRENT MAJOR DEPRESSIVE DISORDER, WITHOUT PSYCHOTIC FEATURES (H): ICD-10-CM

## 2019-06-24 RX ORDER — ARIPIPRAZOLE 10 MG/1
TABLET ORAL
Qty: 30 TABLET | Refills: 0 | Status: CANCELLED | OUTPATIENT
Start: 2019-06-24

## 2019-06-26 RX ORDER — LAMOTRIGINE 200 MG/1
TABLET ORAL
Qty: 60 TABLET | Refills: 0 | OUTPATIENT
Start: 2019-06-26

## 2019-06-26 RX ORDER — ACETYLCYSTEINE 600 MG
CAPSULE ORAL
Qty: 60 CAPSULE | Refills: 0 | OUTPATIENT
Start: 2019-06-26

## 2019-06-26 NOTE — TELEPHONE ENCOUNTER
Duplicate: Refills denied  lamictal 200 mg tab: last Rx  4-11-19 for 60 tab w/3 RF   : Last RX 6-11-19 for 60 tab w/1 RF  Next Clinic visit: 8-1-19

## 2019-06-27 ENCOUNTER — OFFICE VISIT (OUTPATIENT)
Dept: PSYCHIATRY | Facility: CLINIC | Age: 29
End: 2019-06-27
Payer: COMMERCIAL

## 2019-06-27 DIAGNOSIS — F50.819 BINGE EATING DISORDER: Primary | ICD-10-CM

## 2019-06-27 RX ORDER — LAMOTRIGINE 200 MG/1
400 TABLET ORAL DAILY
Qty: 60 TABLET | Refills: 0 | Status: SHIPPED | OUTPATIENT
Start: 2019-06-27 | End: 2019-07-03

## 2019-06-27 RX ORDER — ARIPIPRAZOLE 10 MG/1
10 TABLET ORAL DAILY
Qty: 30 TABLET | Refills: 1 | Status: SHIPPED | OUTPATIENT
Start: 2019-06-27 | End: 2019-07-08

## 2019-06-27 NOTE — TELEPHONE ENCOUNTER
Rox Thomas RN Pratt, Laura, RN   Caller: Unspecified (3 days ago, 12:57 AM)             Walter Lin,   The abilify is not mentioned in Dr. Ca last Clinic note.   Last written Rx 1-17-19 for 90 tabs w/0 RF.  Last pharm disp 6-6-19 for 30 tabs.   Thanks,          Writer called pt to confirm if she's still taking Abilify or not. Would also like to confirm other medications. LVM requesting a c/b.

## 2019-06-27 NOTE — TELEPHONE ENCOUNTER
Called the pt's preferred pharmacy and confirmed that she has no refills remaining of lamotrigine 200 mg as well. This was last filled on 6/6. Writer sent 30 d/s with 0 refills to get pt to 8/1 appt.

## 2019-06-27 NOTE — PROGRESS NOTES
Behavioral Activation Psychotherapy Progress Note       [facility]  [address], [address]  [place] [address]       Name: Lexus Uribe  MRN#: [id number]  Age: 28 year old  YOB: 1990  Date: June 27, 2019  Duration: 25 minutes (Patient arrived late: starting time = 8:35 AM; stopping time = 9:00 AM)      Content of Psychotherapy Session:  Reviewed changes in eating patterns as well as precipitants of overeating. Agreed to self-monitor eating using her cell phone and to set her phone alarm as a reminder to eat every 3-4 hours. Discussed new job as well as experiences with weight stigma.     Current Symptoms  Intermittent dysphoria, self-criticism, guilt, dysphoria, anxiety, worry, ruminative thoughts, binge eating, and overvaluation of shape/weight. Ms. Uribe denies suicidal ideation.      Mental Status Exam:  Appearance: appropriate  Behavior: normal  Eye contact: normal  Speech: normal  Mood: euthymic  Affect: mood congruent  Thought Process: clear  Suicidal Ideation: denies  Hallucinations: none     Diagnosis  Major depression, recurrent, severe, without psychotic symptoms F33.2  Eating disorder (current binge eating disorder, past bulimia nervosa) F50.81  ADHD, F90.0  Alcohol abuse, currently in early remission, F10.11     Plan  Continue CBT sessions with a focus binge eating and overvaluation of shape/weight as well as depression.     Signed:  Anayeli Andrade, Ph.D.    Department of Psychiatry

## 2019-06-27 NOTE — TELEPHONE ENCOUNTER
Received return phone call from the pt. She confirmed that she is still taking Abilify 10 mg daily and has not missed any days of the medication. Writer agreed to refill the medication for 30 d/s with 1 additional refill to get pt to 8/1 appt.

## 2019-07-03 DIAGNOSIS — F33.2 SEVERE EPISODE OF RECURRENT MAJOR DEPRESSIVE DISORDER, WITHOUT PSYCHOTIC FEATURES (H): ICD-10-CM

## 2019-07-05 RX ORDER — LAMOTRIGINE 200 MG/1
400 TABLET ORAL DAILY
Qty: 60 TABLET | Refills: 0 | Status: SHIPPED | OUTPATIENT
Start: 2019-07-05 | End: 2019-09-21

## 2019-07-05 NOTE — TELEPHONE ENCOUNTER
Medication requested: lamoTRIgine (LAMICTAL) 200 MG tablet  Last refilled: 6/27/19  Qty: 60      Last seen: 4/4/19  RTC: 2 months  Cancel: 0  No-show: 0  Next appt: 8/1/19    Refill decision:   Refilled for 30 days per protocol.

## 2019-07-08 ENCOUNTER — OFFICE VISIT (OUTPATIENT)
Dept: PSYCHIATRY | Facility: CLINIC | Age: 29
End: 2019-07-08
Payer: COMMERCIAL

## 2019-07-08 DIAGNOSIS — F33.2 MAJOR DEPRESSIVE DISORDER, RECURRENT, SEVERE WITHOUT PSYCHOTIC FEATURES (H): ICD-10-CM

## 2019-07-08 DIAGNOSIS — R63.5 WEIGHT GAIN: Primary | ICD-10-CM

## 2019-07-08 RX ORDER — ARIPIPRAZOLE 5 MG/1
5 TABLET ORAL DAILY
Qty: 30 TABLET | Refills: 1 | Status: SHIPPED | OUTPATIENT
Start: 2019-07-08 | End: 2019-08-01

## 2019-07-08 NOTE — PATIENT INSTRUCTIONS
- decrease Abilify to 5mg daily  - start metformin 500mg daily with food for a week, THEN increase to 500mg twice daily with food  - speak with Anayeli about the timeline expectations regarding therapy  - continue to follow-up with Dr. Robledo  - if depression worsens, we can consider TMS
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
## 2019-07-12 ENCOUNTER — OFFICE VISIT (OUTPATIENT)
Dept: PSYCHIATRY | Facility: CLINIC | Age: 29
End: 2019-07-12
Payer: COMMERCIAL

## 2019-07-12 DIAGNOSIS — F33.2 MAJOR DEPRESSIVE DISORDER, RECURRENT, SEVERE WITHOUT PSYCHOTIC FEATURES (H): Primary | ICD-10-CM

## 2019-07-17 DIAGNOSIS — F33.1 MODERATE EPISODE OF RECURRENT MAJOR DEPRESSIVE DISORDER (H): ICD-10-CM

## 2019-07-18 NOTE — PROGRESS NOTES
Behavioral Activation Psychotherapy Progress Note       [facility]  [address], [address]  [place] [address]       Name: Lexus Uribe  MRN#: [id number]  Age: 28 year old  YOB: 1990  Date: July 12, 2019  Duration: 40 minutes (Patient arrived late: starting time = 12:20 PM; stopping time = 1:00 PM)      Content of Psychotherapy Session:  Reviewed interpersonal and cognitive precipitants of worsening depression, particularly in the context of work. Discussed effective skill use in communication and self-regulation and noted that these challenges did not lead to binge eating. Reviewed developmental origins of emotion patterns and links to trauma. Reviewed eating patterns and discussed specific behavioral goals.     Current Symptoms  Intermittent dysphoria, self-criticism, guilt, dysphoria, anxiety, worry, ruminative thoughts, and overvaluation of shape/weight. Ms. Uribe denies suicidal ideation.      Mental Status Exam:  Appearance: appropriate  Behavior: normal  Eye contact: normal  Speech: normal  Mood: anxious, tearful  Affect: mood congruent  Thought Process: clear  Suicidal Ideation: denies  Hallucinations: none     Diagnosis  Major depression, recurrent, severe, without psychotic symptoms F33.2  Eating disorder (current binge eating disorder, past bulimia nervosa) F50.81  ADHD, F90.0  Alcohol abuse, currently in early remission, F10.11     Plan  Continue CBT sessions with a focus binge eating and overvaluation of shape/weight as well as depression.     Signed:  Anayeli Andrade, Ph.D.    Department of Psychiatry

## 2019-07-19 RX ORDER — FLUOXETINE 40 MG/1
CAPSULE ORAL
Qty: 180 CAPSULE | Refills: 0 | OUTPATIENT
Start: 2019-07-19

## 2019-07-25 ENCOUNTER — OFFICE VISIT (OUTPATIENT)
Dept: PSYCHIATRY | Facility: CLINIC | Age: 29
End: 2019-07-25
Payer: COMMERCIAL

## 2019-07-25 DIAGNOSIS — F33.2 MAJOR DEPRESSIVE DISORDER, RECURRENT, SEVERE WITHOUT PSYCHOTIC FEATURES (H): Primary | ICD-10-CM

## 2019-07-25 NOTE — TELEPHONE ENCOUNTER
Received incoming phone call from the pt requesting a refill of fluoxetine 40 mg caps, take 2 capsules daily. She had been without the medication for approximately 2-3 days and then received an emergency supply, which she is out of now. Pt is requesting a refill ASAP. Reviewed pt's chart and there is no documentation that she is still taking this medication. Will discuss with the provider.

## 2019-07-26 RX ORDER — FLUOXETINE 40 MG/1
80 CAPSULE ORAL DAILY
Qty: 60 CAPSULE | Refills: 0 | Status: SHIPPED | OUTPATIENT
Start: 2019-07-26 | End: 2019-08-01

## 2019-07-26 NOTE — TELEPHONE ENCOUNTER
Bret Robledo MD Labossiere, Laura, RN   Caller: Unspecified (1 week ago)             OK to fill        Writer filled medication for 30 d/s per provider's approval.

## 2019-07-29 ENCOUNTER — TELEPHONE (OUTPATIENT)
Dept: PSYCHIATRY | Facility: CLINIC | Age: 29
End: 2019-07-29

## 2019-07-29 DIAGNOSIS — F33.2 MAJOR DEPRESSIVE DISORDER, RECURRENT, SEVERE WITHOUT PSYCHOTIC FEATURES (H): ICD-10-CM

## 2019-07-29 NOTE — LETTER
August 8, 2019      RE: Lexus Uribe  [address]  [place] [address]         To Whom it May Concern,    This request is in response to the denial of coverage for acetylcysteine () 600 MG CAPS capsule; take 1 capsule (600 mg) by mouth 2 times daily, #60 for a 30 day supply.     Lexus has been under my care at the [facility] since February of 2015. 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 Since 8th grade, Lexus has struggled with symptoms related to her mental health diagnoses. She has tried and failed the following medications related to her depressive and compulsive symptoms:    -clonidine (tried prior to 1st appt here on 2/23/15),   -venlafaxine (tried prior to 1st appt here on 2/23/15)  -sertraline (tried prior to 1st appt here on 2/23/15)    She's currently on a medication regimen that includes NAC, bupropion, Vyvanse, lamotrigine, aripiprazole, Dextrostat, and fluoxetine. NAC was started in January of 2019 for a relapse in alcohol use and an increase in pulling related to her trichotillomania diagnosis. Since starting this medication, Lexus's mental state has been very stable. She has avoided using alcohol and has noticed a significant decrease in hair pulling. Discontinuing NAC would likely lead to a relapse in using alcohol and a return in increased hair pulling.    If this medication is denied, Lexus will be at risk for decompensation and hospitalization, along with a possible increase in self harm behaviors, such as hair pulling. We are requesting an expedited review of this medication, as Lexus's current medication regimen has been effective. Please contact our clinic if you have additional questions regarding this request. Thank you for your time.    Sincerely,      Bret Robledo MD

## 2019-07-29 NOTE — TELEPHONE ENCOUNTER
Writer received incoming call from patient [phone]. She is requesting a refill for acetylcysteine () 600 MG CAPS capsule. Writer agreed to reach out to the pharmacy to confirm a refill on file. She also requested this writer to complete a PA so that it can go through the insurance. She reports paying out of pocket last month. Writer agreed to investigate with pharmacy.     Placed a call to pharmacy at [phone] and confirmed patient has one refill on file. Patient has been paying $18.99 out of pocket. Per pharmacist, submitting a PA is the only option to confirm if insurance will pay for a mediation OTC. Writer agreed with the plan.     Routed to nurse partner Delia TRAN RN

## 2019-07-29 NOTE — TELEPHONE ENCOUNTER
Prior Authorization Retail Medication Request    Medication/Dose: acetylcysteine () 600 MG CAPS capsule- Take 1 capsule (600 mg) by mouth 2 times daily - Oral  ICD code (if different than what is on RX):  Major depressive disorder, recurrent, severe without psychotic features (H) [F33.2]   Previously Tried and Failed:  Aripiprazole, bupropion, lamotrigine, Dextrostat, Cymbalta, Gabapentin and Topamax   Rationale:  Patient was started on NAC on 2/18/19 for trichotillomania. She has been paying out of pocket and is unable to continue due to other financial responsibilities.    Insurance Name:  Not provided   Insurance ID:  Not provided       Pharmacy Information (if different than what is on RX)  Name:  Same   Phone:  Same

## 2019-07-29 NOTE — TELEPHONE ENCOUNTER
"[facility] Call Center    Phone Message    May a detailed message be left on voicemail: yes    Reason for Call: Form or Letter   Type or form/letter needing completion: Letter for medical necessity for NAC medication  Provider: Bret Robledo MD  Date form needed: ASAP    Patient went to pharmacy to refill medication but was told the letter is \"closed\". She needs a new form or have it opened again.      Action Taken: Message routed to:  Other: Nursing pool  "

## 2019-08-01 ENCOUNTER — OFFICE VISIT (OUTPATIENT)
Dept: PSYCHIATRY | Facility: CLINIC | Age: 29
End: 2019-08-01
Attending: PSYCHIATRY & NEUROLOGY
Payer: COMMERCIAL

## 2019-08-01 DIAGNOSIS — F33.2 MAJOR DEPRESSIVE DISORDER, RECURRENT, SEVERE WITHOUT PSYCHOTIC FEATURES (H): ICD-10-CM

## 2019-08-01 DIAGNOSIS — F90.0 ATTENTION DEFICIT HYPERACTIVITY DISORDER (ADHD), PREDOMINANTLY INATTENTIVE TYPE: ICD-10-CM

## 2019-08-01 DIAGNOSIS — F33.1 MAJOR DEPRESSIVE DISORDER, RECURRENT EPISODE, MODERATE (H): ICD-10-CM

## 2019-08-01 DIAGNOSIS — F33.1 MODERATE EPISODE OF RECURRENT MAJOR DEPRESSIVE DISORDER (H): ICD-10-CM

## 2019-08-01 RX ORDER — DEXTROAMPHETAMINE SULFATE 10 MG/1
10 TABLET ORAL
Qty: 60 TABLET | Refills: 0 | Status: SHIPPED | OUTPATIENT
Start: 2019-08-01 | End: 2019-08-07

## 2019-08-01 RX ORDER — ARIPIPRAZOLE 2 MG/1
2 TABLET ORAL DAILY
Qty: 30 TABLET | Refills: 3 | Status: SHIPPED | OUTPATIENT
Start: 2019-08-01 | End: 2019-11-25

## 2019-08-01 RX ORDER — LISDEXAMFETAMINE DIMESYLATE 70 MG/1
140 CAPSULE ORAL 2 TIMES DAILY
Qty: 120 CAPSULE | Refills: 0 | Status: SHIPPED | OUTPATIENT
Start: 2019-08-01 | End: 2019-08-07

## 2019-08-01 RX ORDER — DEXTROAMPHETAMINE SULFATE 10 MG/1
10 TABLET ORAL 2 TIMES DAILY
Qty: 60 TABLET | Refills: 0 | Status: SHIPPED | OUTPATIENT
Start: 2019-10-01 | End: 2019-08-07

## 2019-08-01 RX ORDER — LISDEXAMFETAMINE DIMESYLATE 70 MG/1
140 CAPSULE ORAL EVERY MORNING
Qty: 60 CAPSULE | Refills: 0 | Status: SHIPPED | OUTPATIENT
Start: 2019-09-01 | End: 2019-08-07

## 2019-08-01 RX ORDER — FLUOXETINE 40 MG/1
80 CAPSULE ORAL DAILY
Qty: 60 CAPSULE | Refills: 3 | Status: SHIPPED | OUTPATIENT
Start: 2019-08-01 | End: 2019-11-23

## 2019-08-01 RX ORDER — LISDEXAMFETAMINE DIMESYLATE 70 MG/1
140 CAPSULE ORAL EVERY MORNING
Qty: 60 CAPSULE | Refills: 0 | Status: SHIPPED | OUTPATIENT
Start: 2019-10-01 | End: 2019-08-07

## 2019-08-01 RX ORDER — BUPROPION HYDROCHLORIDE 150 MG/1
450 TABLET ORAL EVERY MORNING
Qty: 90 TABLET | Refills: 3 | Status: SHIPPED | OUTPATIENT
Start: 2019-08-01 | End: 2019-12-26

## 2019-08-01 RX ORDER — DEXTROAMPHETAMINE SULFATE 10 MG/1
10 TABLET ORAL
Qty: 60 TABLET | Refills: 0 | Status: SHIPPED | OUTPATIENT
Start: 2019-09-01 | End: 2019-08-07

## 2019-08-01 NOTE — PATIENT INSTRUCTIONS
Thank you for coming to the PSYCHIATRY CLINIC.    Lab Testing:  If you had lab testing today and your results are reassuring or normal they will be mailed to you or sent through [employer] within 7 days.   If the lab tests need quick action we will call you with the results.  The phone number we will call with results is # [phone] (home) . If this is not the best number please call our clinic and change the number.    Medication Refills:  If you need any refills please call your pharmacy and they will contact us. Our fax number for refills is [phone]. Please allow three business for refill processing.   If you need to  your refill at a new pharmacy, please contact the new pharmacy directly. The new pharmacy will help you get your medications transferred.     Scheduling:  If you have any concerns about today's visit or wish to schedule another appointment please call our office during normal business hours [phone] (8-5:00 M-F)    Contact Us:  Please call [phone] during business hours (8-5:00 M-F).  If after clinic hours, or on the weekend, please call  [phone].    Financial Assistance [phone]  [facility] Billing [phone]  [place] Billing Office, [facility]: [phone]  [facility] Billing [phone]  Medical Records [phone]      MENTAL HEALTH CRISIS NUMBERS:  [facility]:   [facility] - [phone]   Crisis Residence [facility] - [phone]   Walk-In Counseling [facility] [phone]   COPE 24/7 [facility] Mobile Team for Adults - [[phone]]; Child - [[phone]]        [facility]:   [facility] - [phone]   Walk-in counseling [facility] - [phone]   Walk-in counseling [facility] - [phone]   Crisis Residence [facility] - [phone]   Urgent Care Adult Mental Health:   --Drop-in, 24/7 crisis line, and [facility] Mobile Team  [[phone]]    CRISIS TEXT LINE: Text [phone] from anywhere, anytime, any crisis 24/7;    OR SEE [email or web address].org     Poison Control Center - [phone]    CHILD: [facility] needs assessment team - [phone]     [facility] - [phone]; or [facility] Lif[facility] - [phone]    If you have a medical emergency please call 911or go to the nearest ER.                    _____________________________________________    Again thank you for choosing PSYCHIATRY CLINIC and please let us know how we can best partner with you to improve you and your family's health.  You may be receiving a survey in the mail regarding this appointment. We would love to have your feedback, both positive and negative, so please fill out the survey and return it using the provided envelope. The survey is done by an external company, so your answers are anonymous.

## 2019-08-02 ENCOUNTER — TELEPHONE (OUTPATIENT)
Dept: PSYCHIATRY | Facility: CLINIC | Age: 29
End: 2019-08-02

## 2019-08-02 DIAGNOSIS — F90.0 ATTENTION DEFICIT HYPERACTIVITY DISORDER (ADHD), PREDOMINANTLY INATTENTIVE TYPE: ICD-10-CM

## 2019-08-02 RX ORDER — ACETYLCYSTEINE 600 MG
CAPSULE ORAL
Qty: 60 CAPSULE | Refills: 0 | OUTPATIENT
Start: 2019-08-02

## 2019-08-02 ASSESSMENT — PATIENT HEALTH QUESTIONNAIRE - PHQ9: SUM OF ALL RESPONSES TO PHQ QUESTIONS 1-9: 12

## 2019-08-02 NOTE — TELEPHONE ENCOUNTER
----- Message from Kannan Guzman RN sent at 8/2/2019  3:14 PM CDT -----  Regarding: FW: Mail Medication  Contact: [phone]      ----- Message -----  From: Sanjay Vivi  Sent: 8/2/2019   7:22 AM  To: Delia Osorio RN  Subject: Mail Medication                                  Hello,    This patient left a vm on the clinic phone stating the would like her prescriptions mailed to her since she left them here at the clinic.      Thank You:)    Romelia Grace   Patient Representative   [facility]  [phone]      Writer prepped pt's scripts for mailing to the pt at the 4PM mailbox.  Vyvanse 70 mg, start dates 8/1, 9/1, 10/1.  Dextrostat 10 mg, start dates 8/1, 9/1, 10/1.

## 2019-08-06 NOTE — TELEPHONE ENCOUNTER
PRIOR AUTHORIZATION DENIED    Medication: acetylcysteine () 600 MG CAPS capsule- Take 1 capsule (600 mg) by mouth 2 times daily - Oral    Denial Date: 8/6/2019    Denial Rational: Not covered under the patients plan    Appeal Information:

## 2019-08-06 NOTE — TELEPHONE ENCOUNTER
PA Initiation    Medication: acetylcysteine () 600 MG CAPS capsule- Take 1 capsule (600 mg) by mouth 2 times daily - Oral  Insurance Company: [employer] - Phone [phone] Fax [phone]  Pharmacy Filling the Rx: [facility] #[id number] - [address] AT [facility]  Filling Pharmacy Phone:    Filling Pharmacy Fax:    Start Date: 8/6/2019

## 2019-08-06 NOTE — TELEPHONE ENCOUNTER
Bret Robledo MD Labossiere, Laura, ROLANDA             Can we write a letter to her insurance to try to get them to pay for it?     Thanks        Notified provider that PA has been sent to the PA team.

## 2019-08-07 RX ORDER — DEXTROAMPHETAMINE SULFATE 10 MG/1
10 TABLET ORAL 2 TIMES DAILY
Qty: 60 TABLET | Refills: 0 | Status: SHIPPED | OUTPATIENT
Start: 2019-10-02 | End: 2019-10-21

## 2019-08-07 RX ORDER — DEXTROAMPHETAMINE SULFATE 10 MG/1
10 TABLET ORAL
Qty: 60 TABLET | Refills: 0 | Status: SHIPPED | OUTPATIENT
Start: 2019-08-07 | End: 2019-10-21

## 2019-08-07 RX ORDER — LISDEXAMFETAMINE DIMESYLATE 70 MG/1
140 CAPSULE ORAL 2 TIMES DAILY
Qty: 120 CAPSULE | Refills: 0 | Status: SHIPPED | OUTPATIENT
Start: 2019-08-07 | End: 2019-09-26

## 2019-08-07 RX ORDER — DEXTROAMPHETAMINE SULFATE 10 MG/1
10 TABLET ORAL
Qty: 60 TABLET | Refills: 0 | Status: SHIPPED | OUTPATIENT
Start: 2019-09-04 | End: 2019-10-21

## 2019-08-07 RX ORDER — LISDEXAMFETAMINE DIMESYLATE 70 MG/1
140 CAPSULE ORAL EVERY MORNING
Qty: 60 CAPSULE | Refills: 0 | Status: SHIPPED | OUTPATIENT
Start: 2019-10-02 | End: 2019-09-26

## 2019-08-07 RX ORDER — LISDEXAMFETAMINE DIMESYLATE 70 MG/1
140 CAPSULE ORAL EVERY MORNING
Qty: 60 CAPSULE | Refills: 0 | Status: SHIPPED | OUTPATIENT
Start: 2019-09-04 | End: 2019-09-26

## 2019-08-07 NOTE — TELEPHONE ENCOUNTER
Writer received incoming phone call from the pt. She said she accidentally threw away both the Vyvanse and Dextrostat scripts, as she thought it was a survey from the clinic. Reviewed the  and it appears she has not filled these. Writer agreed to resend the prescriptions.    Vyvanse scripts printed with start dates of: 8/7, 9/4, 10/2  Dextrostat scripts printed with start dates of: 8/7, 9/4, 10/2

## 2019-08-08 NOTE — TELEPHONE ENCOUNTER
Received incoming phone call from Nam with [employer]. He informed this writer that they will have a decision within the next 72 hours.

## 2019-08-08 NOTE — TELEPHONE ENCOUNTER
Medication Appeal Initiation    We have initiated an appeal for the requested medication:  Medication: acetylcysteine () 600 MG CAPS capsule- Take 1 capsule (600 mg) by mouth 2 times daily - Oral  Appeal Start Date:  8/8/2019  Insurance Company: [employer] - Phone [phone] Fax [phone]  Comments:

## 2019-08-09 NOTE — TELEPHONE ENCOUNTER
Received incoming phone call from Nam with [employer]. After reviewing the appeal, they have decided to uphold their decision and deny coverage of the medication as it's an OTC med. Will inform the provider.

## 2019-08-09 NOTE — TELEPHONE ENCOUNTER
Bret Robledo MD Labossiere, Laura, RN   Caller: Unspecified (1 week ago)             There is nothing to take it's place.        Called pt. No answer. LVM notifying her that the PA was denied. Informed her that there's a possibility of using a coupon to lower the price.

## 2019-08-15 DIAGNOSIS — F33.2 MAJOR DEPRESSIVE DISORDER, RECURRENT, SEVERE WITHOUT PSYCHOTIC FEATURES (H): ICD-10-CM

## 2019-08-19 RX ORDER — ARIPIPRAZOLE 10 MG/1
TABLET ORAL
Qty: 90 TABLET | Refills: 0 | Status: SHIPPED | OUTPATIENT
Start: 2019-08-19 | End: 2019-11-26 | Stop reason: DRUGHIGH

## 2019-08-19 NOTE — TELEPHONE ENCOUNTER
Writer reviewed pt's chart. Appears provider filled Abilify 2 mg on 8/1 (pt's last office visit). Note is unsigned. Request routed to provider to confirm if pt is still receiving 10 mg tab as well.

## 2019-08-19 NOTE — PROGRESS NOTES
Service Date: 08/01/2019      PSYCHIATRY CLINIC PROGRESS NOTE      The patient returns for medication management and supportive therapy.      Interim History:      Since the last visit, working full time at [employer]. School starts in September. 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 Having a poor time keeping up hygiene especially oral. Had 2nd opinion with Dr. Gann.       RECENT SYMPTOMS:      Depression:    Suicidal ideation: Minimal, passive   depressed mood: When not busy   anhedonia: No. Tries to write but, too tired   low energy: After work   insomnia: No   appetite change: Excess, gained 40 pounds   excessive guilt: Feels like has not done anything in her life      Elevated Mood: No manic symptoms        Psychosis: No       Panic Attacks: No      Anxiety:     excessive worry: Yes   social anxiety: Some, like to isolate   nervous: When thinks about the future      Trauma-Related Symptoms: No       ADVERSE EFFECTS: Restless, cramping in foot       MEDICAL CONCERNS: None new, hypertension       SUBSTANCE USE:    Alcohol: 1oz alcohol several nights per week      SOCIAL/FAMILY HISTORY: Will be studying abroad in [place] otherwise, unchanged      MEDICAL/SURGICAL HISTORY:  See EMR medical problems list.      MEDICAL REVIEW OF SYSTEMS:  A comprehensive review of systems was performed and is negative other than as noted in the HPI.      ALLERGIES: None new       CURRENT MEDICATIONS:  See EMR med sections.      VITALS:  See rooming note.      MENTAL STATUS EXAM:     Alertness: yes   Appearance: well groomed   Behavior/Demeanor: cooperative   Speech: nl rate and flow   Psychomotor: unremarkable    Mood: reactive   Affect: congruent   Thought Process/Associations: logical   Thought content: no SI   Perception: no psychosis    Insight: good   Judgement: good   Cognition:   Oriented: x4   Attention span: good   Concentration: good   Recent memory: intact   Remote memory: intact   Fund of knowledge: good      Gait and  station: Normal      Labs and Data: PHQ9=12        DIAGNOSIS AND ASSESSMENT: MDD-recurrent and in remission, ADD, Morbid obesity.       PLAN:   1. Medications: No changes     2. Therapy: Anayeli Andrade, PhD   3. RTC: 3 months     4. Crisis numbers:  Provided routinely in AVS.      Treatment Risk Statement: The patient understands the risks, benefits, adverse effects, and alternatives. Agrees to treatment with the capacity to do so. No medical contraindications to treatment. Agrees to call clinic for any problems. The patient understands to call 911 or come to the nearest ED is life-threatening or urgent symptoms present.         MARVEL ZAVALA MD             D: 2019   T: 2019   MT: JONNY      Name:     ERICK ORELLANA   MRN:      [id number]        Account:      [id number]   :      1990           Service Date: 2019      Document: [id number]

## 2019-09-01 DIAGNOSIS — F33.2 MAJOR DEPRESSIVE DISORDER, RECURRENT, SEVERE WITHOUT PSYCHOTIC FEATURES (H): ICD-10-CM

## 2019-09-06 NOTE — TELEPHONE ENCOUNTER
Medication requested:  SUMATRIPTAN 50MG TABLETS  Last refilled: Not on medication list or history.  Refill decision:    > to provider  Medication requested:  ARIPIPRAZOLE 10MG TABLETS TAKE 1 TABLET BY MOUTH EVERY DAY  Last refilled: 8/1/2019  Qty: 90     Last seen: 8/1/2019  RTC: 3 months  Cancel: 0  No-show: 0  Next appt: 12/26/2019

## 2019-09-09 RX ORDER — SUMATRIPTAN 50 MG/1
TABLET, FILM COATED ORAL
Qty: 30 TABLET | Refills: 0 | Status: SHIPPED | OUTPATIENT
Start: 2019-09-09 | End: 2019-11-30

## 2019-09-09 RX ORDER — ARIPIPRAZOLE 10 MG/1
TABLET ORAL
Qty: 30 TABLET | Refills: 0 | Status: SHIPPED | OUTPATIENT
Start: 2019-09-09 | End: 2019-10-01

## 2019-09-09 NOTE — TELEPHONE ENCOUNTER
Writer reviewed pt's chart further. Appears provider refilled Abilify 10 mg tabs on 8/19 for 90 d/s. On 7/8/19, Dr. Gann decreased Abilify dose to 5 mg. Confirmed that sumatriptan is not on med list.     Called [facility] and was informed they never received the Abilify 10 mg Rx. Also, staff confirms that Dr. Robledo filled sumatriptan in January. Will discuss with the provider.

## 2019-09-21 DIAGNOSIS — F33.2 SEVERE EPISODE OF RECURRENT MAJOR DEPRESSIVE DISORDER, WITHOUT PSYCHOTIC FEATURES (H): ICD-10-CM

## 2019-09-24 RX ORDER — LAMOTRIGINE 200 MG/1
400 TABLET ORAL DAILY
Qty: 60 TABLET | Refills: 2 | Status: SHIPPED | OUTPATIENT
Start: 2019-09-24 | End: 2019-11-30

## 2019-09-24 NOTE — TELEPHONE ENCOUNTER
Medication requested: lamoTRIgine (LAMICTAL) 200 MG tablet  Last refilled: 9/1/19  Qty: 60      Last seen: 8/1/19  RTC: 3 months  Cancel: 0  No-show: 0  Next appt: 12/26/19    Refill decision:   Refilled for 30 days per protocol.

## 2019-09-26 DIAGNOSIS — F90.0 ATTENTION DEFICIT HYPERACTIVITY DISORDER (ADHD), PREDOMINANTLY INATTENTIVE TYPE: ICD-10-CM

## 2019-09-26 RX ORDER — LISDEXAMFETAMINE DIMESYLATE 70 MG/1
140 CAPSULE ORAL EVERY MORNING
Qty: 60 CAPSULE | Refills: 0 | Status: SHIPPED | OUTPATIENT
Start: 2019-10-24 | End: 2019-09-26

## 2019-09-26 RX ORDER — LISDEXAMFETAMINE DIMESYLATE 70 MG/1
140 CAPSULE ORAL
Qty: 120 CAPSULE | Refills: 0 | Status: SHIPPED | OUTPATIENT
Start: 2019-10-24 | End: 2019-12-26

## 2019-09-26 RX ORDER — LISDEXAMFETAMINE DIMESYLATE 70 MG/1
140 CAPSULE ORAL
Qty: 120 CAPSULE | Refills: 0 | Status: SHIPPED | OUTPATIENT
Start: 2019-09-26 | End: 2019-10-21

## 2019-09-26 NOTE — TELEPHONE ENCOUNTER
Violette Gary, [facility]  Delia Osorio, ROLANDA; Bret Robledo MD   Cc: Kathy Santana, [facility]   Caller: Unspecified (Today, 11:45 AM)             Hi Delia,     If she told her insurance she is going to be out of the country during that time and her insurance still won't approve >30 day supplies, I don't know that there are many other great options. I am not sure this would be a PAable issue?     I did look into patient assistance programs which might get her a 90 day supply if she qualifies. She would have to fill out an application at the link below and Dr. Robledo would have to sign as well. Kathy also thought of having a family member mail it to her each month, but of course that comes with potential issues too.     https://[email or web address].org/brand-drug/name/Vyvanse     Let us know if we can help in any other way.     Thanks,     Violette

## 2019-09-26 NOTE — TELEPHONE ENCOUNTER
Writer received incoming phone call from the pt requesting new Vyvanse prescriptions. Per the pt, she takes four 70 mg capsules per day vs two capsules, which was ordered for September and October. Pt is asking that new scripts are sent to the pharmacy reflecting the accurate dose. She asked that this is e-prescribed to [facility] on [address]. Writer agreed to discuss this with the provider first.     The pt also informed this writer that she will be leaving for [place] from January until June. She called insurance to see if she can be given more than 30 days of medication at a time for her trip. Insurance told her they will not allow more than 30 days worth of meds at a time. She is wondering what can be done to override this. Agreed to discuss this with pharmacy, as writer is uncertain. Prior authorizations may be necessary at the time of her trip.

## 2019-09-26 NOTE — TELEPHONE ENCOUNTER
Called pharmacy and spoke with pharmacist, Sudhir. He discontinued September's paper script and script for 10/24 with wrong directions.     New Rx for October routed to the provider.

## 2019-09-26 NOTE — TELEPHONE ENCOUNTER
Reviewed pt's chart. Provider ordered medications, although October Rx is still incorrect. Will call pharmacy and cancel this Rx.    Called pt and provided update that September Rx is correct and October Rx needs to be adjusted. She voiced understanding. Pt brought previous September Rx to pharmacy and has October Rx. Asked that she shred October Rx, which she agreed to do.     Relayed the Pharm D's message regarding excess medication. Explained that further investigation will be needed, but writer will keep her updated throughout the process. She agreed with this plan (reminder set to resolve issue with excess medication).

## 2019-10-01 DIAGNOSIS — F33.2 MAJOR DEPRESSIVE DISORDER, RECURRENT, SEVERE WITHOUT PSYCHOTIC FEATURES (H): ICD-10-CM

## 2019-10-01 DIAGNOSIS — R63.5 WEIGHT GAIN: ICD-10-CM

## 2019-10-03 RX ORDER — ARIPIPRAZOLE 10 MG/1
10 TABLET ORAL DAILY
Qty: 30 TABLET | Refills: 2 | Status: SHIPPED | OUTPATIENT
Start: 2019-10-03 | End: 2019-11-26 | Stop reason: DRUGHIGH

## 2019-10-03 NOTE — TELEPHONE ENCOUNTER
Medication requested:    mg caps  Abilify 10 mg tab  Last refilled:   9/1/19 9/9/19  Qty:   60  30      Last seen: 8/1/19  RTC: 3 months  Cancel: 0  No-show: 0  Next appt: 12/26/19    Refill decision:   Refilled for 30 days per protocol.

## 2019-10-03 NOTE — TELEPHONE ENCOUNTER
Medication requested: Metformin 500 mg tab  Last refilled: 9/1/19  Qty: 60      Last seen: 8/1/19  RTC: 3 months  Cancel: 0  No-show: 0  Next appt: 12/26/19    Refill decision:   Refilled for 30 days per protocol.

## 2019-10-21 DIAGNOSIS — F90.0 ATTENTION DEFICIT HYPERACTIVITY DISORDER (ADHD), PREDOMINANTLY INATTENTIVE TYPE: ICD-10-CM

## 2019-10-21 RX ORDER — DEXTROAMPHETAMINE SULFATE 10 MG/1
10 TABLET ORAL
Qty: 60 TABLET | Refills: 0 | Status: SHIPPED | OUTPATIENT
Start: 2019-12-16 | End: 2019-12-26

## 2019-10-21 RX ORDER — DEXTROAMPHETAMINE SULFATE 10 MG/1
10 TABLET ORAL
Qty: 60 TABLET | Refills: 0 | Status: SHIPPED | OUTPATIENT
Start: 2019-11-18 | End: 2020-01-28

## 2019-10-21 RX ORDER — LISDEXAMFETAMINE DIMESYLATE 70 MG/1
140 CAPSULE ORAL
Qty: 120 CAPSULE | Refills: 0 | Status: SHIPPED | OUTPATIENT
Start: 2019-12-19 | End: 2019-12-26

## 2019-10-21 RX ORDER — LISDEXAMFETAMINE DIMESYLATE 70 MG/1
140 CAPSULE ORAL
Qty: 120 CAPSULE | Refills: 0 | Status: SHIPPED | OUTPATIENT
Start: 2019-11-21 | End: 2020-02-12

## 2019-10-21 RX ORDER — DEXTROAMPHETAMINE SULFATE 10 MG/1
10 TABLET ORAL 2 TIMES DAILY
Qty: 60 TABLET | Refills: 0 | Status: SHIPPED | OUTPATIENT
Start: 2019-10-21 | End: 2020-01-28

## 2019-10-21 NOTE — TELEPHONE ENCOUNTER
Last seen: 8/1/19  RTC: 3 months  Cancel: none  No-show: none  Next appt: 12/26/19     Medication requested: lisdexamfetamine (VYVANSE) 70 MG capsule  Directions: Take 2 capsules (140 mg) by mouth 2 times daily  Qty: 120  Last refilled: 9/26/19, 8/20/19, and 7/17/19 per MN      Medication requested: dextroamphetamine (DEXTROSTAT) 10 MG tablet  Directions: Take 1 tablet (10 mg) by mouth 2 times daily   Qty: 60  Last refilled: 9/22/19, 8/20/19, and 7/17/19 per MN      -Reviewed pt's chart and it appears she has one moreVyvanse refill available at the pharmacy     -Multiple prescriptions pended and routed to the provider for review/signature.     Dextrostat start dates: 10/21, 11/18, and 12/16    Vyvanse start dates: 11/21 and 12/19

## 2019-10-21 NOTE — TELEPHONE ENCOUNTER
[facility] Call Center    Phone Message    May a detailed message be left on voicemail: yes    Reason for Call: Medication Refill Request    Has the patient contacted the pharmacy for the refill? Yes   Name of medication being requested: vyvanse 70mg and dextroamphetamine 10mg  Provider who prescribed the medication: Bret Robledo MD  Pharmacy:  [facility] #[id number] [address] AT [facility]  Date medication is needed: ASAP        Action Taken: Message routed to:  Other: Nursing pool

## 2019-10-22 ENCOUNTER — TELEPHONE (OUTPATIENT)
Dept: PSYCHIATRY | Facility: CLINIC | Age: 29
End: 2019-10-22

## 2019-10-22 NOTE — TELEPHONE ENCOUNTER
3 pages of forms was received from [facility], requesting [employer] Forms. The original copies were put in Dr. Robledo's folder and a note was routed to return completed forms to writers folder.Nataliia Dumont LPN

## 2019-10-23 ENCOUNTER — TELEPHONE (OUTPATIENT)
Dept: PSYCHIATRY | Facility: CLINIC | Age: 29
End: 2019-10-23

## 2019-10-23 NOTE — TELEPHONE ENCOUNTER
Reviewed pt's chart. Appears forms were received yesterday and are in Dr. Robledo's folder for completion.

## 2019-10-23 NOTE — TELEPHONE ENCOUNTER
[facility] Call Center    Phone Message    May a detailed message be left on voicemail: yes    Reason for Call: Other: Pt stated she was calling to speak with Delia about some forms that sent over by her PCP.     Action Taken: Other: [facility] Psych Pool

## 2019-10-23 NOTE — TELEPHONE ENCOUNTER
Writer received incoming phone call from the pt stating the pharmacy only gave her a prescription for #60 with directions of taking two caps every morning. Pt takes 4 caps per day and needs #120. The pt said the pharmacy will try running a new Rx with the increased dose and qty tomorrow. Agreed to connect with pharmacy tomorrow (reminder set).

## 2019-10-24 NOTE — TELEPHONE ENCOUNTER
Writer called [facility] pharmacy and spoke with pharmacist, Zen. He recommends that pt continue to take the two caps twice daily with current prescription. Zen asks that she call the pharmacy when running low and they will do an override to give her the correct Rx.    Called pt and LVM with the above information. Requested a c/b if she has additional questions.

## 2019-10-24 NOTE — TELEPHONE ENCOUNTER
10/24/2019, completed forms were returned to this writer and faxed to [facility] [employer] at [phone]. The original copies were sent to scanning and copies are held in Psych until scanning is complete.Nataliia Dumont LPN

## 2019-10-31 DIAGNOSIS — F33.1 MODERATE EPISODE OF RECURRENT MAJOR DEPRESSIVE DISORDER (H): ICD-10-CM

## 2019-11-01 RX ORDER — FLUOXETINE 40 MG/1
CAPSULE ORAL
Qty: 60 CAPSULE | Refills: 0 | OUTPATIENT
Start: 2019-11-01

## 2019-11-05 ENCOUNTER — HEALTH MAINTENANCE LETTER (OUTPATIENT)
Age: 29
End: 2019-11-05

## 2019-11-07 ENCOUNTER — OFFICE VISIT (OUTPATIENT)
Dept: URGENT CARE | Facility: URGENT CARE | Age: 29
End: 2019-11-07
Payer: COMMERCIAL

## 2019-11-07 VITALS
TEMPERATURE: 98.3 F | WEIGHT: 293 LBS | SYSTOLIC BLOOD PRESSURE: 137 MMHG | BODY MASS INDEX: 41.95 KG/M2 | HEIGHT: 70 IN | DIASTOLIC BLOOD PRESSURE: 82 MMHG | HEART RATE: 94 BPM

## 2019-11-07 DIAGNOSIS — K04.7 DENTAL ABSCESS: Primary | ICD-10-CM

## 2019-11-07 PROCEDURE — 99203 OFFICE O/P NEW LOW 30 MIN: CPT | Performed by: FAMILY MEDICINE

## 2019-11-07 RX ORDER — AMOXICILLIN 875 MG
875 TABLET ORAL 2 TIMES DAILY
Qty: 20 TABLET | Refills: 0 | Status: SHIPPED | OUTPATIENT
Start: 2019-11-07

## 2019-11-07 ASSESSMENT — MIFFLIN-ST. JEOR: SCORE: 2442.27

## 2019-11-07 NOTE — LETTER
[facility]  [address] [facility] [address] [address]  [phone]      November 7, 2019    RE:  Lexus Uribe                                                                                                                                                       [address]   [place] [address]            To whom it may concern:    Lexus Uribe is under my professional care for Dental abscess.            Sincerely,        Silvia Velazquez MD    [facility]

## 2019-11-08 NOTE — PROGRESS NOTES
SUBJECTIVE:  Chief Complaint   Patient presents with     Urgent Care     Dental Pain     c/o tooth pain and gum pain for 2 days     Lexus Uribe is a 29 year old female with a chief complaint of dental pain .  Onset of symptoms was 2 day(s) ago.  Has pain at lingual side of right lower 2nd molar-  Has had pus drainage from the gingiva at the root level.  She has attempted to arrange emergency dental care yes- in 2 days has evaluation  Course of illness: gradual onset, still present and worsening.    Severity:  moderate.  Current and Associated symptoms:  none  Treatment measures tried include None tried  She has had her wisdom teeth removed previously     Past Medical History:   Diagnosis Date     Depressive disorder      Patient Active Problem List   Diagnosis     Depressive state       ALLERGIES:  Augmentin      MEDs  acetylcysteine () 600 MG CAPS capsule, Take 1 capsule (600 mg) by mouth 2 times daily  ARIPiprazole (ABILIFY) 10 MG tablet, Take 1 tablet (10 mg) by mouth daily  ARIPiprazole (ABILIFY) 10 MG tablet, TAKE 1 TABLET BY MOUTH EVERY DAY  ARIPiprazole (ABILIFY) 2 MG tablet, Take 1 tablet (2 mg) by mouth daily  buPROPion (WELLBUTRIN XL) 150 MG 24 hr tablet, Take 3 tablets (450 mg) by mouth every morning  [START ON 12/16/2019] dextroamphetamine (DEXTROSTAT) 10 MG tablet, Take 1 tablet (10 mg) by mouth 2 times daily  [START ON 11/18/2019] dextroamphetamine (DEXTROSTAT) 10 MG tablet, Take 1 tablet (10 mg) by mouth 2 times daily  dextroamphetamine (DEXTROSTAT) 10 MG tablet, Take 1 tablet (10 mg) by mouth 2 times daily  FLUoxetine (PROZAC) 40 MG capsule, Take 2 capsules (80 mg) by mouth daily  lamoTRIgine (LAMICTAL) 200 MG tablet, Take 2 tablets (400 mg) by mouth daily  [START ON 12/19/2019] lisdexamfetamine (VYVANSE) 70 MG capsule, Take 2 capsules (140 mg) by mouth 2 times daily  [START ON 11/21/2019] lisdexamfetamine (VYVANSE) 70 MG capsule, Take 2 capsules (140 mg) by mouth 2 times  "daily  lisdexamfetamine (VYVANSE) 70 MG capsule, Take 2 capsules (140 mg) by mouth 2 times daily  losartan (COZAAR) 25 MG tablet, Take 1 tablet (25 mg) by mouth daily  LOSARTAN POTASSIUM PO, Take 25 mg by mouth daily  metFORMIN (GLUCOPHAGE) 500 MG tablet, Take 1 tablet (500 mg) by mouth 2 times daily (with meals)  OMEPRAZOLE PO, Take 40 mg by mouth every morning  omeprazole-sodium bicarbonate (ZEGERID)  MG per capsule, 1 capsule  Ranitidine HCl (ZANTAC PO), Take 300 mg by mouth At Bedtime  SUMAtriptan (IMITREX) 50 MG tablet, TAKE ONE TABLET BY MOUTH EVERY 2 HOURS AS NEEDED. MAX OF 200MG PER 24 HOURS.    No current facility-administered medications on file prior to visit.       Social History     Tobacco Use     Smoking status: Never Smoker     Smokeless tobacco: Never Used     Tobacco comment: smokes socially    Substance Use Topics     Alcohol use: Yes     Alcohol/week: 0.0 standard drinks       Family History   Problem Relation Age of Onset     Depression Maternal Grandmother      Anxiety Disorder Maternal Grandmother      Depression Maternal Grandfather      Anxiety Disorder Maternal Grandfather      Bipolar Disorder Sister          ROS:  CONSTITUTIONAL:NEGATIVE for fever, chills,   INTEGUMENTARY/SKIN: NEGATIVE for worrisome rashes,  or lesions  EYES: NEGATIVE for vision changes or irritation  RESP:NEGATIVE for significant cough or SOB  GI: NEGATIVE for nausea, abdominal pain,  or change in bowel habits       OBJECTIVE:   /82   Pulse 94   Temp 98.3  F (36.8  C) (Oral)   Ht 1.778 m (5' 10\")   Wt (!) 164.2 kg (362 lb)   Breastfeeding? No   BMI 51.94 kg/m    Patient indicates that the following tooth is causing the pain symptoms:  right   lower 2nd molar   No Caries of the painful tooth noted   With  purulent drainage noted: lingual side at the level of the depth of the root  Pain with percussion over the affected tooth    Little Swelling of the gingiva and cheek adjacent to the affected tooth-  " Has open sore with purulent drainage 2 x 2mm:      GENERAL APPEARANCE: alert, moderate distress and cooperative  EYES: EOMI,  PERRL, conjunctiva clear  HENT: ear canals and TM's normal.  Nose normal.  Pharynx not erythematous   NECK: supple, non-tender to palpation, no adenopathy noted  RESP: lungs clear to auscultation - no rales, rhonchi or wheezes  CV: regular rates and rhythm, normal S1 S2, no murmur noted  ABDOMEN:  soft, nontender, no HSM or masses and bowel sounds normal  SKIN: no suspicious lesions or rashes       ASSESSMENT:  Dental abscess      - amoxicillin (AMOXIL) 875 MG tablet; Take 1 tablet (875 mg) by mouth 2 times daily        Symptomatic treatment    OTC analgesic (ibuprofen and or acetaminophen)  as needed.   Follow-up with primary dental  clinic for definitive therapy.  Also use teething gel for pain relief

## 2019-11-23 ENCOUNTER — OFFICE VISIT (OUTPATIENT)
Dept: URGENT CARE | Facility: URGENT CARE | Age: 29
End: 2019-11-23
Payer: COMMERCIAL

## 2019-11-23 VITALS
OXYGEN SATURATION: 99 % | DIASTOLIC BLOOD PRESSURE: 88 MMHG | WEIGHT: 293 LBS | SYSTOLIC BLOOD PRESSURE: 132 MMHG | HEIGHT: 70 IN | BODY MASS INDEX: 41.95 KG/M2 | HEART RATE: 88 BPM | TEMPERATURE: 99.1 F

## 2019-11-23 DIAGNOSIS — K12.1 ORAL ULCERATION: Primary | ICD-10-CM

## 2019-11-23 DIAGNOSIS — F33.1 MODERATE EPISODE OF RECURRENT MAJOR DEPRESSIVE DISORDER (H): ICD-10-CM

## 2019-11-23 PROBLEM — E66.01 MORBID OBESITY (H): Status: ACTIVE | Noted: 2019-11-23

## 2019-11-23 PROCEDURE — 99213 OFFICE O/P EST LOW 20 MIN: CPT | Performed by: FAMILY MEDICINE

## 2019-11-23 RX ORDER — TRIAMCINOLONE ACETONIDE 0.1 %
PASTE (GRAM) DENTAL
Qty: 5 G | Refills: 0 | Status: SHIPPED | OUTPATIENT
Start: 2019-11-23

## 2019-11-23 ASSESSMENT — MIFFLIN-ST. JEOR: SCORE: 2442.27
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
## 2019-11-25 DIAGNOSIS — F33.2 MAJOR DEPRESSIVE DISORDER, RECURRENT, SEVERE WITHOUT PSYCHOTIC FEATURES (H): ICD-10-CM

## 2019-11-25 RX ORDER — FLUOXETINE 40 MG/1
CAPSULE ORAL
Qty: 60 CAPSULE | Refills: 0 | Status: SHIPPED | OUTPATIENT
Start: 2019-11-25 | End: 2019-12-26

## 2019-11-25 NOTE — TELEPHONE ENCOUNTER
Medication requested:FLUoxetine (PROZAC) 40 MG   Last refilled: 8/1/19  Qty: 60: 3      Last seen: 8/1/19  RTC: 3 MOS  Next appt:  12/26/19    Refill decision: Refilled for 30 days per protocol.  Medications: No changes

## 2019-11-26 RX ORDER — ARIPIPRAZOLE 2 MG/1
2 TABLET ORAL DAILY
Qty: 30 TABLET | Refills: 0 | Status: SHIPPED | OUTPATIENT
Start: 2019-11-26 | End: 2019-12-26

## 2019-11-30 DIAGNOSIS — F33.2 SEVERE EPISODE OF RECURRENT MAJOR DEPRESSIVE DISORDER, WITHOUT PSYCHOTIC FEATURES (H): ICD-10-CM

## 2019-11-30 DIAGNOSIS — F33.2 MAJOR DEPRESSIVE DISORDER, RECURRENT, SEVERE WITHOUT PSYCHOTIC FEATURES (H): ICD-10-CM

## 2019-12-01 RX ORDER — SUMATRIPTAN 50 MG/1
50 TABLET, FILM COATED ORAL
Qty: 30 TABLET | Refills: 0 | Status: SHIPPED | OUTPATIENT
Start: 2019-12-01

## 2019-12-01 RX ORDER — LAMOTRIGINE 200 MG/1
400 TABLET ORAL DAILY
Qty: 60 TABLET | Refills: 0 | Status: SHIPPED | OUTPATIENT
Start: 2019-12-01 | End: 2019-12-26

## 2019-12-02 ENCOUNTER — TELEPHONE (OUTPATIENT)
Dept: PSYCHIATRY | Facility: CLINIC | Age: 29
End: 2019-12-02

## 2019-12-02 NOTE — TELEPHONE ENCOUNTER
Medication requested:   Lamictal 200 mg tab  Imitrex 50 mg tab    Last refilled: 10/23/19  Qty: 30      Last seen: 8/1/19  RTC: 3 months  Cancel: 0  No-show: 0  Next appt: 12/26/19    Refill decision:   Refilled for 30 days per protocol.

## 2019-12-02 NOTE — TELEPHONE ENCOUNTER
SUBJECTIVE:   Sanam Morin is a 28 year old female who presents to clinic today for the following health issues:      Concern - check lump on her buttocks  Onset: 4 days    Description:   lump    Intensity: mild    Progression of Symptoms:  improving    Accompanying Signs & Symptoms:  none    Previous history of similar problem:   none    Precipitating factors:   Worsened by: sitting    Alleviating factors:  Improved by: taking the pressure off her buttocks.    Therapies Tried and outcome: nothing    Patient had an area of swelling and tenderness to the top pf her butt crack last week, she denies any drainage from the area, she has not had fevers or chills, she has not had any injury to the area. No other associated symptoms.     -------------------------------------    Problem list and histories reviewed & adjusted, as indicated.  Additional history: as documented    BP Readings from Last 3 Encounters:   09/26/17 100/58   09/22/17 108/66   07/18/17 126/80    Wt Readings from Last 3 Encounters:   09/26/17 162 lb 14.4 oz (73.9 kg)   07/18/17 156 lb 8 oz (71 kg)   06/19/17 157 lb 8 oz (71.4 kg)        Reviewed and updated as needed this visit by clinical staffTobacco  Allergies  Med Hx  Surg Hx  Fam Hx  Soc Hx      Reviewed and updated as needed this visit by Provider         ROS:  Constitutional, HEENT, cardiovascular, pulmonary, gi and gu systems are negative, except as otherwise noted.      OBJECTIVE:   /58 (BP Location: Left arm, Patient Position: Chair, Cuff Size: Adult Regular)  Pulse 68  Temp 97.4  F (36.3  C) (Oral)  Resp 12  Wt 162 lb 14.4 oz (73.9 kg)  LMP 09/04/2017  SpO2 100%  BMI 28.31 kg/m2  Body mass index is 28.31 kg/(m^2).  GENERAL: healthy, alert and no distress  RESP: lungs clear to auscultation - no rales, rhonchi or wheezes  CV: regular rates and rhythm, peripheral pulses strong and no peripheral edema  SKIN: there is no palpable mass to the area, there is a small brown  [facility] Call Center    Phone Message    May a detailed message be left on voicemail: yes    Reason for Call: Medication Refill Request    Has the patient contacted the pharmacy for the refill? Yes   Name of medication being requested: vyvanse 70mg, dextroamphetamine 10mg  Provider who prescribed the medication: Bret Robledo MD  Pharmacy:  [facility] #[id number] [address] AT [facility]  Date medication is needed: ASAP - prior to appt at end of December        Action Taken: Message routed to:  Other: Nursing pool   area but no redness or other evidence of infection or acute cyst    Diagnostic Test Results:  none     ASSESSMENT/PLAN:       ICD-10-CM    1. Pain in the coccyx M53.3 GENERAL SURG ADULT REFERRAL   2. Cyst near coccyx L05.91 GENERAL SURG ADULT REFERRAL       I discussed pilonidal cyst with patient and if any increased swelling or pain would have her see surgery for further evaluation and treatment.   See Patient Instructions    Silvana Salcedo PA-C  [facility]

## 2019-12-02 NOTE — TELEPHONE ENCOUNTER
Reviewed pt's chart and MN . Confirmed that she has not filled November scripts for Vyvanse or Adderall.    Called pt and LVM asking her to call the pharmacy to refill her medications.

## 2019-12-03 ENCOUNTER — TELEPHONE (OUTPATIENT)
Dept: PSYCHIATRY | Facility: CLINIC | Age: 29
End: 2019-12-03

## 2019-12-03 NOTE — TELEPHONE ENCOUNTER
Bret Robledo MD Labossiere, Laura, RN   Caller: Unspecified (Today,  2:05 PM)             Yes, if she can work out the insurance details.      Called the pt to discuss this further. Informed her that the provider will provide 4-6 months worth of medication. Per the pt, the pharmacy is also on board. She will be meeting with a health  on Thursday to discuss this further and hopefully apply for private insurance. Writer is still uncertain about state regulations regarding stimulants, so will check with pharmacist in clinic and [facility] pharmacy if needed. Will then check in with patient later this week.

## 2019-12-03 NOTE — TELEPHONE ENCOUNTER
[facility] Call Center    Phone Message    May a detailed message be left on voicemail: yes    Reason for Call: Other: The pt stated that she is planning on going abroad for [place]6 months. With her current insurance, she cannot obtain more than a 30-day supply of medications. She plans to purchase private insurance, and she is wondering, when she does this, would Dr. Robledo be willing to write her 4-6 months worth of all of her prescriptions?     Action Taken: Message routed to:  Other: Delia Patel RNCC

## 2019-12-04 NOTE — TELEPHONE ENCOUNTER
Violette Gary, [facility]  Delia Osorio, RN   Caller: Unspecified (Yesterday,  2:05 PM)             Walter Lin,     I double checked with retail pharmacy on this. You are right - law limits retail pharmacies to dispensing a max of 90 days of CII medications at a time, which includes Adderall and Vyvanse. Retail said there is no way around this.     Violette        Writer called Matt, pharmacist with [facility] pharmacy. He confirmed that there is nothing further they can do to provide the pt with more than 90 days of Vyvanse or Dextrostat. He suggests having a family member  the medication and mail it to her.     Called the pt and relayed Violette's message. She voiced understanding, but was also quite frustrated. She asked if this writer would be willing to write a letter to the state. Agreed to look into other options first. Pt said she will be studying abroad and Vyvanse is the only medication that really keeps her functioning. She's still resistant to mailing the medication and is asking if the provider would be able to try a dose change or prescribe Adderall for the last three months to keep her covered. Agreed to discuss this with the provider.

## 2019-12-05 NOTE — TELEPHONE ENCOUNTER
Bret Robledo MD Labossiere, Laura, RN   Caller: Unspecified (2 days ago,  2:05 PM)             I will speak about it with her on 12/26.

## 2019-12-05 NOTE — TELEPHONE ENCOUNTER
Bret Robledo MD Labossiere, Laura, RN   Caller: Unspecified (2 days ago,  2:05 PM)             She could try reducing the Vyvanse to once per day, but we will still write it for twice daily.  That should give her 6 months worth.      Writer called pt. No answer. LVM requesting a c/b to discuss the provider's recommendation above.

## 2019-12-09 ENCOUNTER — TELEPHONE (OUTPATIENT)
Dept: PSYCHIATRY | Facility: CLINIC | Age: 29
End: 2019-12-09

## 2019-12-09 NOTE — TELEPHONE ENCOUNTER
"-Writer received this email from the pt as well:    \"Hi Dr. Robledo,    I submitted this to your office October 11th and, as my study abroad program has not received it, it appears to have been overlooked.  I only need you to fill out pages 7-8.  They are requesting it by end of business day tomorrow.     Thank you,\"    -Reminder set to print these pages and fax to SIT tomorrow morning.   "

## 2019-12-09 NOTE — TELEPHONE ENCOUNTER
Maurilio Bowles Laura, RN   Phone Number: [phone]             Patient called in today regarding studying abroad documentation regarding her disability. She stated she has several differing forms being compiled, although this individual one was began in October? She is requesting it by tomorrow. Unsure of timeline obviously.        Nataliia Dumont completed forms. Called pt and LVM with this information. Requested a c/b if she has any further questions or concerns.

## 2019-12-09 NOTE — LETTER
December 9, 2019      RE: Lexus Uribe  [address]  [place] [address]         To Whom it May Concern,    Lexus Uribe has been under my care at the [facility] since February of 2015. 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 No further testing was completed at the [facility], but she continues to exhibit signs and symptoms of this diagnosis. This includes, lack of concentration and focus, disorganization, impulsivity, and lack of energy. With that being said, Lexus has been compliant with my treatment recommendations. I am requesting the following accommodations for her diagnosis of ADHD:    -Use of laptop for in class notes  -Use of laptop for word processing essays  -Quiet testing area with reduced visual and auditory distractions  -Permission to record lecture  -Note taking support  -Double time on tests    Not allowing accommodations could lead to poor academic performance, worsening mental health, and overall decompensation.     If you have any questions regarding this letter, please call the clinic at the number listed above.    Sincerely,      Bret Robledo MD

## 2019-12-09 NOTE — TELEPHONE ENCOUNTER
Bret Robledo MD Labossiere, Laura, RN   Caller: Unspecified (Today,  9:27 AM)             You can go ahead and write it.     Thanks,   Bret

## 2019-12-09 NOTE — TELEPHONE ENCOUNTER
Writer received return phone call from the pt. She informed this writer the forms should have been faxed to [facility] in [place] vs [facility]. Agreed to fax these to the accurate location tomorrow morning and will then call the pt with an update (reminder set). She voiced understanding.

## 2019-12-09 NOTE — TELEPHONE ENCOUNTER
"-Writer received the following request from the pt via email:      \"Hi Dr. Robledo,    Would you please provide documentation for ADD/ADHD and mental health accommodations?  I'm requesting:    -Use of laptop for in class notes  -Use of laptop for word processing essays  -Quiet testing area with reduced visual and auditory distractions  -Permission to record lecture  -Note taking support  -Double time on tests    This is what they would like for documentation:  Documentation  The amount and type of documentation required will depend on the individual situation and specific accommodation requests. Documentation submitted to Disability Services should be current and provided by a qualified professional. The documentation must be written on that professional s letterhead, personally signed by the evaluator, and include the following:    Functional impact of the stated disability on one or more major life activities;  Specific diagnosis of the disability;  A comprehensive history of the disability and the use of accommodations, if applicable;  Specific evidence of the disability, including any diagnostic tests or other evaluations performed and results, clinical observations, and/or other relevant evidence of the disability;  Recommendations from the evaluator for accommodations, including academic adjustments or auxiliary aids.\"      -Message forwarded to the provider for approval.  "

## 2019-12-10 NOTE — TELEPHONE ENCOUNTER
-Letter printed and placed in provider's folder for review and signature.     -SIT forms faxed to [phone] per pt's request. Copy held at writer's desk.

## 2019-12-10 NOTE — TELEPHONE ENCOUNTER
Called pt and provided update. Once letter is signed, she would like this emailed to [email or web address] and [email or web address]

## 2019-12-23 DIAGNOSIS — F33.1 MODERATE EPISODE OF RECURRENT MAJOR DEPRESSIVE DISORDER (H): ICD-10-CM

## 2019-12-26 ENCOUNTER — OFFICE VISIT (OUTPATIENT)
Dept: PSYCHIATRY | Facility: CLINIC | Age: 29
End: 2019-12-26
Attending: PSYCHIATRY & NEUROLOGY
Payer: COMMERCIAL

## 2019-12-26 DIAGNOSIS — F33.1 MAJOR DEPRESSIVE DISORDER, RECURRENT EPISODE, MODERATE (H): ICD-10-CM

## 2019-12-26 DIAGNOSIS — F33.1 MODERATE EPISODE OF RECURRENT MAJOR DEPRESSIVE DISORDER (H): ICD-10-CM

## 2019-12-26 DIAGNOSIS — R63.5 WEIGHT GAIN: ICD-10-CM

## 2019-12-26 DIAGNOSIS — F33.2 SEVERE EPISODE OF RECURRENT MAJOR DEPRESSIVE DISORDER, WITHOUT PSYCHOTIC FEATURES (H): ICD-10-CM

## 2019-12-26 DIAGNOSIS — F33.2 MAJOR DEPRESSIVE DISORDER, RECURRENT, SEVERE WITHOUT PSYCHOTIC FEATURES (H): ICD-10-CM

## 2019-12-26 DIAGNOSIS — F90.0 ATTENTION DEFICIT HYPERACTIVITY DISORDER (ADHD), PREDOMINANTLY INATTENTIVE TYPE: ICD-10-CM

## 2019-12-26 RX ORDER — LAMOTRIGINE 200 MG/1
400 TABLET ORAL DAILY
Qty: 240 TABLET | Refills: 0 | Status: SHIPPED | OUTPATIENT
Start: 2019-12-26 | End: 2020-01-28

## 2019-12-26 RX ORDER — LISDEXAMFETAMINE DIMESYLATE 70 MG/1
350 CAPSULE ORAL EVERY MORNING
Qty: 450 CAPSULE | Refills: 0 | Status: SHIPPED | OUTPATIENT
Start: 2019-12-26 | End: 2020-02-12

## 2019-12-26 RX ORDER — BUPROPION HYDROCHLORIDE 150 MG/1
450 TABLET ORAL EVERY MORNING
Qty: 90 TABLET | Refills: 0 | Status: SHIPPED | OUTPATIENT
Start: 2019-12-26 | End: 2019-12-26

## 2019-12-26 RX ORDER — BUPROPION HYDROCHLORIDE 150 MG/1
450 TABLET ORAL EVERY MORNING
Qty: 120 TABLET | Refills: 0 | Status: SHIPPED | OUTPATIENT
Start: 2019-12-26 | End: 2020-01-07

## 2019-12-26 RX ORDER — LAMOTRIGINE 200 MG/1
400 TABLET ORAL DAILY
Qty: 60 TABLET | Refills: 0 | Status: SHIPPED | OUTPATIENT
Start: 2019-12-26 | End: 2019-12-26

## 2019-12-26 RX ORDER — ARIPIPRAZOLE 2 MG/1
2 TABLET ORAL DAILY
Qty: 30 TABLET | Refills: 0 | Status: SHIPPED | OUTPATIENT
Start: 2019-12-26 | End: 2019-12-26

## 2019-12-26 RX ORDER — LISDEXAMFETAMINE DIMESYLATE 70 MG/1
350 CAPSULE ORAL EVERY MORNING
Qty: 480 CAPSULE | Refills: 0 | Status: SHIPPED | OUTPATIENT
Start: 2019-12-26 | End: 2019-12-26

## 2019-12-26 RX ORDER — FLUOXETINE 40 MG/1
CAPSULE ORAL
Qty: 60 CAPSULE | Refills: 0 | Status: SHIPPED | OUTPATIENT
Start: 2019-12-26 | End: 2019-12-26

## 2019-12-26 RX ORDER — DEXTROAMPHETAMINE SULFATE 10 MG/1
10 TABLET ORAL
Qty: 60 TABLET | Refills: 0 | Status: SHIPPED | OUTPATIENT
Start: 2019-12-26 | End: 2019-12-26

## 2019-12-26 RX ORDER — FLUOXETINE 40 MG/1
CAPSULE ORAL
Qty: 240 CAPSULE | Refills: 0 | Status: SHIPPED | OUTPATIENT
Start: 2019-12-26 | End: 2020-01-28

## 2019-12-26 RX ORDER — ARIPIPRAZOLE 2 MG/1
2 TABLET ORAL DAILY
Qty: 120 TABLET | Refills: 0 | Status: SHIPPED | OUTPATIENT
Start: 2019-12-26 | End: 2020-01-22

## 2019-12-26 RX ORDER — DEXTROAMPHETAMINE SULFATE 10 MG/1
10 TABLET ORAL 3 TIMES DAILY
Qty: 270 TABLET | Refills: 0 | Status: SHIPPED | OUTPATIENT
Start: 2019-12-26 | End: 2020-01-28

## 2019-12-26 RX ORDER — LISDEXAMFETAMINE DIMESYLATE 70 MG/1
140 CAPSULE ORAL
Qty: 120 CAPSULE | Refills: 0 | Status: SHIPPED | OUTPATIENT
Start: 2019-12-26 | End: 2020-01-27

## 2019-12-26 NOTE — TELEPHONE ENCOUNTER
Medication requested: FLUoxetine (PROZAC) 40 MG capsule    Last seen: 8/1/19  RTC: 3 months  Cancel: 0  No-show: 0  Next appt: 12/26/19    Refill decision: Patient being seen today. Message sent to provider regarding need for refill.

## 2019-12-27 RX ORDER — FLUOXETINE 40 MG/1
CAPSULE ORAL
Qty: 60 CAPSULE | Refills: 0 | Status: SHIPPED | OUTPATIENT
Start: 2019-12-27 | End: 2020-01-28

## 2019-12-27 NOTE — TELEPHONE ENCOUNTER
Last seen: 12/26/19  RTC:   Cancel: none  No-show: none  Next appt: 6/11/20    Incoming refill from Pharmacy via Interface    Medication requested: Metformin 500 mg   Directions: Take 1 tablet by mouth 2 times daily with meals   Qty: 60  Last refilled: 10/3/19 with 2 refills     Routed to provider, due to last note unsigned

## 2019-12-29 RX ORDER — ACETYLCYSTEINE 600 MG
CAPSULE ORAL
Qty: 60 CAPSULE | Refills: 2 | OUTPATIENT
Start: 2019-12-29

## 2020-01-06 DIAGNOSIS — F33.2 MAJOR DEPRESSIVE DISORDER, RECURRENT, SEVERE WITHOUT PSYCHOTIC FEATURES (H): ICD-10-CM

## 2020-01-07 DIAGNOSIS — F33.1 MAJOR DEPRESSIVE DISORDER, RECURRENT EPISODE, MODERATE (H): ICD-10-CM

## 2020-01-07 RX ORDER — BUPROPION HYDROCHLORIDE 150 MG/1
450 TABLET ORAL EVERY MORNING
Qty: 270 TABLET | Refills: 0 | Status: SHIPPED | OUTPATIENT
Start: 2020-01-07 | End: 2020-01-28

## 2020-01-07 NOTE — TELEPHONE ENCOUNTER
Last Seen 12/26/19    RTC Last note unsigned    Cancel 0    No-Show 0    Next Appt 6/11/20    Incoming Refill From [facility] via fax    Medication Requested buPROPion (WELLBUTRIN XL) 150 MG 24 hr tablet     Directions Take 3 tablets (450 mg) by mouth every morning    Qty 270    Last Refill 12/26/19 quantity of 120    Medication Refill Pended Per Refill Protocol    Lyric Mcgrath LPN

## 2020-01-09 ENCOUNTER — TELEPHONE (OUTPATIENT)
Dept: PSYCHIATRY | Facility: CLINIC | Age: 30
End: 2020-01-09

## 2020-01-09 DIAGNOSIS — F33.2 MAJOR DEPRESSIVE DISORDER, RECURRENT, SEVERE WITHOUT PSYCHOTIC FEATURES (H): ICD-10-CM

## 2020-01-09 RX ORDER — ACETYLCYSTEINE 600 MG
CAPSULE ORAL
Qty: 60 CAPSULE | OUTPATIENT
Start: 2020-01-09

## 2020-01-09 NOTE — TELEPHONE ENCOUNTER
Writer called pt to gather more information. She informed this writer that her new Vyvanse script for 350 mg per day needs a qty override. She asked that writer contact Michelle with [facility] at [phone] to inquire more information, as to what needs to be done. Lexus is concerned there may be issues with the dextroamphetamine as well.    Writer called Michelle. No answer. LVM requesting a c/b.     Pt will be leaving the country on 1/30, but is hoping to get this resolved by 1/15 if possible, as there's other things she needs to complete before leaving the country.

## 2020-01-09 NOTE — TELEPHONE ENCOUNTER
Muhumed, Yasmin Labossiere, Laura, RN             Terri Lin,     Just got a call from this pt. She said she wanted to talk to you about her medication, didn't specify what exactly but wants you to give her a call. She is Dr. Robledo pt and I added her number below.       [phone]     Thank you     Slime

## 2020-01-09 NOTE — TELEPHONE ENCOUNTER
Medication requested: NAC 600MG CAPSULES acetylcysteine  Take 1 capsule (600 mg) by mouth 2 times daily   Last refilled: 12/26/2019  Qty: 240/0  [id number] - [address] AT [facility]    Last seen: 12/26/2019 Note incomplete from this date.  RTC:    Cancel: 0  No-show: 0  Next appt: 6/11/20     Denied: duplicate request.

## 2020-01-10 DIAGNOSIS — F90.0 ATTENTION DEFICIT HYPERACTIVITY DISORDER (ADHD), PREDOMINANTLY INATTENTIVE TYPE: ICD-10-CM

## 2020-01-10 NOTE — TELEPHONE ENCOUNTER
Writer received notification from Cover My Meds that PA request was cancelled due to no coverage with this plan or duplicate request. Current PA on file for 4 caps per day vs 5 caps per day. Will call [employer] at the number in the previous message.

## 2020-01-10 NOTE — TELEPHONE ENCOUNTER
Called [employer] and spoke with representative who informed this writer that qty limit override form will need to be completed. While on the phone with this writer, she faxed form to the clinic. Confirmed that writer can complete this for new qty limit override, as this was completed previously for 4 caps per day. Will have to complete when back in clinic on 1/14 (reminder set).

## 2020-01-10 NOTE — TELEPHONE ENCOUNTER
Writer was notified by pt and pharmacy that Vyvanse 70 mg capsules, Take 5 capsules (350 mg) by mouth every morning, #450 requires a PA.    Called the pharmacy and gathered the following insurance information:    [employer]  BIN: [id number]  PCN: [facility]  ID: [id number]  Group #: [id number]  Phone: [phone]    PA started via Cover My Meds on 1/10/20 at approximately 8:30 am. Called Michelle with [employer] to notify her that PA was initiated (see 1/9 encounter).

## 2020-01-10 NOTE — LETTER
2020    RE: Lexus Uribe   [address]  [place] [address]  : 1990       To Whom it May Concern,    This request is in response to the denial of lisdexamfetamine (VYVANSE) 70 MG capsule; Take 5 capsules (350 mg) by mouth every morning, #450 for 90 d/s. We kindly ask that you reconsider your choice to deny this medication.    Lexus Urbie has been under my care at the [facility] since prior to EMR. The first office visit note on record is from 2015. As you can see, Lexus was taking a high dose of Vyvanse at that time. This was used to address her diagnoses of ADHD and binge eating disorder. Unfortunately, her symptoms have remained and have even worsened at times, meaning a slight increase in her daily dose is needed. Please see the attached office visit notes for additional information.     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 Throughout trial and error, we have found that her current medication regimen has been the most effective in treating all of her mental health diagnoses. Discontinuing this now would certainly lead to decompensation and possible hospitalization.    Of note, Lexus will be leaving the country at the end of this month to study abroad for approximately five to six months. While in school, patient has noticed she struggles to concentrate and focus, which is why we have decided to increase her dose. This will allow for coverage while she is studying abroad.    Lexus has been a reliable patient, as she always follows my treatment and medication recommendations. I trust that she will take this high dose appropriately and again, feel it is needed to manage her ADHD and binge eating disorder while she's out of the country. Denying this medication now would lead to  decompensation, possible hospitalization and would risk her opportunity to study abroad.    If you have additional questions regarding this request, please call the number above and ask for my nurse, Delia Patel RN      Sincerely,      Bret Robledo MD

## 2020-01-13 RX ORDER — ACETYLCYSTEINE 600 MG
CAPSULE ORAL
Qty: 60 CAPSULE | OUTPATIENT
Start: 2020-01-13

## 2020-01-14 NOTE — TELEPHONE ENCOUNTER
Writer received qty limit override form. Writer completed this and faxed to [employer] at [phone] and marked as urgent.     Called Michelle with [employer] at [phone]. No answer. LVM with an update.

## 2020-01-15 ENCOUNTER — TELEPHONE (OUTPATIENT)
Dept: PSYCHIATRY | Facility: CLINIC | Age: 30
End: 2020-01-15

## 2020-01-15 NOTE — TELEPHONE ENCOUNTER
Writer was informed that this medication can be bought OTC. Pt could buy bottle of #250 for approximately $17.    Called pt. No answer. LVM with the above information. Agreed to start PA in the meantime.

## 2020-01-15 NOTE — TELEPHONE ENCOUNTER
Lavinia Swanson Laura, RN   Phone Number: [phone]             Lexus, would like a call back. Her pharmacy rejected her acetylcysteine () 600 MG CAPS capsule.     Thanks!

## 2020-01-15 NOTE — TELEPHONE ENCOUNTER
Central Prior Authorization Team   Phone: [phone]      PA Initiation    Medication: acetylcysteine () 600 MG CAPS capsule  Insurance Company: [employer] - Phone [phone] Fax [phone]  Pharmacy Filling the Rx: [id number] [address] AT [facility]  Filling Pharmacy Phone: [phone]  Filling Pharmacy Fax:    Start Date: 1/15/2020

## 2020-01-15 NOTE — TELEPHONE ENCOUNTER
Prior Authorization Retail Medication Request  URGENT REQUEST  Medication/Dose: acetylcysteine () 600 MG CAPS capsule  ICD code (if different than what is on RX):  Major depressive disorder, recurrent, severe without psychotic features (H) [F33.2]   Previously Tried and Failed:  n/a  Rationale:  Patient has a diagnosis of MDD and has struggled with trichotillomania. Of note, the off label use of acetylcysteine is trichotillomania and skin picking. She started NAC many years ago to treat this and has found it very effective. Pt will be leaving the country to study abroad and needs a prescription so her symptoms can continue to be well managed. Discontinuing it now during a stressful transition would certainly lead to an increase in hair pulling and overall decompensation.     [facility]  BIN: [id number]  PCN: [facility]  ID: [id number]  Group #: [id number]  Phone: [phone]    Pharmacy Information (if different than what is on RX)  Name:    Phone:

## 2020-01-15 NOTE — TELEPHONE ENCOUNTER
[facility] Call Center    Phone Message    May a detailed message be left on voicemail: yes    Reason for Call: Medication Question or concern regarding medication   Prescription Clarification  Name of Medication: NAC  Prescribing Provider: Bret Robledo   Pharmacy: Other - [facility]   What on the order needs clarification?     Patient called to report plan to obtain NAC through [facility].        Action Taken: Message routed to:  Other: Delia Patel RNCC

## 2020-01-16 NOTE — TELEPHONE ENCOUNTER
PRIOR AUTHORIZATION DENIED    Medication: acetylcysteine () 600 MG CAPS capsule-DENIED    Denial Date: 1/15/2020    Denial Rational:           Appeal Information:

## 2020-01-16 NOTE — TELEPHONE ENCOUNTER
Bret Robledo MD  You 3 minutes ago (1:27 PM)      Could give her an Rx for regular release amphetamine that she could take with the Vyvanse       Writer discussed situation with Kannan Guzman RN as well. He suggests trying a mail order pharmacy, as he has dealt with a similar situation in the past. These will both be back up plans if appeal is denied.

## 2020-01-16 NOTE — TELEPHONE ENCOUNTER
45 pages of documents faxed to [facility] at [facility] at [phone], as this was the instructions on the phone. This will be held at writer's desk until response is received.

## 2020-01-16 NOTE — TELEPHONE ENCOUNTER
Called Michelle looking for an update. No answer.     Called [employer] at the number in the original message and spoke with representative, Rebekah. Was informed that qty limit override was denied on 1/15/20. Next step would like be an appeal. She asked that writer call [facility] at [phone].    Writer called [facility] at the number above and spoke with representative, Moody. She confirmed the qty limit override was denied and asked that writer complete an appeal. If this is submitted urgently, it will take approximately 72 hours to receive a response. Form needs to be completed, along with a letter of medical necessity and documents.     Called pt and LVM with an update.

## 2020-01-16 NOTE — TELEPHONE ENCOUNTER
"Received return phone call from the pt. She is asking if the provider has a \"plan B and a plan C\" if the appeal is denied. Agreed to reach out to him and see what his thoughts are. Will also try and think of other options.   "

## 2020-01-17 ENCOUNTER — TELEPHONE (OUTPATIENT)
Dept: PSYCHIATRY | Facility: CLINIC | Age: 30
End: 2020-01-17

## 2020-01-17 NOTE — TELEPHONE ENCOUNTER
[facility] Call Center    Phone Message    May a detailed message be left on voicemail: yes    Reason for Call: Other: Pt got a call from [facility] stated the appeal that was suppose to be urgent has been moved to a normal status with the ins company. She wanted know if you were aware of this and if there is anything that can be done.     Action Taken: Other: Delia Patel

## 2020-01-17 NOTE — PROGRESS NOTES
Service Date: 12/26/2019      CLINIC PROGRESS NOTE      PSYCHIATRY    The patient returns for medication management and supportive therapy.      Interim History:      Since the last visit, semester over, but very stressful. Was working and going to school but it was too stressful, so quit working. Will be going to [email or web address]. Infrequent alcohol. NAC helps her resist alcohol.       RECENT SYMPTOMS:      Depression:    Suicidal ideation: No   depressed mood: Situational   anhedonia: Some   low energy: Yes   hypersomnia: Yes   appetite change: Gained 12 pounds   feeling worthless: Feels like a burden on parents      Elevated Mood: No manic symptoms        Psychosis: No        Panic Attacks: A small one        Anxiety:     excessive worry: Some      Trauma-Related Symptoms: No        ADVERSE EFFECTS: None       MEDICAL CONCERNS: S/P bariatric surgery        SUBSTANCE USE:   alcohol: Cutting back       SOCIAL/FAMILY HISTORY: Unchanged        MEDICAL/SURGICAL HISTORY:  See EMR medical problems list.      MEDICAL REVIEW OF SYSTEMS:  A comprehensive review of systems was performed and is negative other than as noted in the HPI.      ALLERGIES: None new       CURRENT MEDICATIONS:  See EMR med sections.      VITALS:  See rooming note.      MENTAL STATUS EXAM:     Alertness: yes   Appearance: morbidly obese   Behavior/Demeanor: cooperative   Speech: nl rate and flow   Psychomotor: normal    Mood: reactive    Affect: congruent    Thought Process/Associations: logical   Thought content: no SI   Perception: no psychosis    Insight: fair    Judgement: fair   Cognition:   Oriented: x4   Attention span: good   Concentration: good   Recent memory: intact   Remote memory: intact   Fund of knowledge: good       Gait and station: Normal       DIAGNOSIS AND ASSESSMENT: MDD, recurrent, in partial remission, alcohol use disorder, ADD.        PLAN:   1. Medications: No changes     2. Therapy: None current   3. RTC: 3 months     4. Crisis  numbers: Provided routinely in AVS.      Treatment Risk Statement: The patient understands the risks, benefits, adverse effects, and alternatives. Agrees to treatment with the capacity to do so. No medical contraindications to treatment. Agrees to call clinic for any problems. The patient understands to call 911 or come to the nearest ED is life-threatening or urgent symptoms present.         MARVEL ZAVALA MD             D: 2020   T: 2020   MT: JONNY      Name:     ERICK ORELLANA   MRN:      [id number]        Account:      [id number]   :      1990           Service Date: 2019      Document: [id number]

## 2020-01-17 NOTE — TELEPHONE ENCOUNTER
Writer received the following pt call in separate encounter:    [facility] Call Center    Phone Message    May a detailed message be left on voicemail: yes    Reason for Call: Other: Pt got a call from [facility] stated the appeal that was suppose to be urgent has been moved to a normal status with the [employer] company. She wanted know if you were aware of this and if there is anything that can be done.

## 2020-01-17 NOTE — TELEPHONE ENCOUNTER
Received incoming phone call from Michelle with [employer]. Provided update on the Vyvanse. She voiced understanding and asked that writer call the other member back and notify her that Michelle is working on the vacation override.    Writer did so. The member reviewing the appeal is Yaima. Left another message for her with the above information and requested a c/b.

## 2020-01-17 NOTE — TELEPHONE ENCOUNTER
Writer called pt back to gather more information. She informed this writer that [facility] changed the request from urgent/expedited to standard or 30 days. She asked for this writer's guidance.    Called [employer] at [phone]. Spoke with representative. After much discussion and explanation, the rep was able to change the request back to urgent, but said a response will likely be received next week. Asked for this in writing, but she refused to do so and said clinic will only receive a letter when the med is approved or denied. Asked to speak with her supervisor. Sat on hold for approximately 15 mins. The rep then answered and said she's unable to transfer this writer to her supervisor, but suggested this writer call the person directly who is working on the appeal request. She provided a number of [phone].    Writer called the number above. No answer. LVM with pt's information and relayed the urgency of this request. Left direct phone number and asked for c/b ASAP. Also provided clinic number, as writer will be out of the clinic on Monday, 1/20 and Tuesday, 1/21.    Called pt and provided update. She voiced understanding. Notified her that writer will be out of clinic early next week.

## 2020-01-20 DIAGNOSIS — F33.2 MAJOR DEPRESSIVE DISORDER, RECURRENT, SEVERE WITHOUT PSYCHOTIC FEATURES (H): ICD-10-CM

## 2020-01-21 NOTE — TELEPHONE ENCOUNTER
ARIPiprazole (ABILIFY) 2 MG    Last refilled: 12/26/19   Qty: 120    Last seen: 12/26/19  RTC:3 MOS    Cancel: 0  No-show: 0  Next appt: 6/11/20   Refill pended and routed to the provider for review/determination due to     Pt outside of RTC timeframe  PENDING APPT + 3 MOS over      RF PENDING

## 2020-01-22 RX ORDER — ARIPIPRAZOLE 2 MG/1
TABLET ORAL
Qty: 30 TABLET | Refills: 2 | Status: SHIPPED | OUTPATIENT
Start: 2020-01-22 | End: 2020-01-28

## 2020-01-23 NOTE — TELEPHONE ENCOUNTER
received incoming phone call from the pt. She is looking for an update on the appeal. Informed her that writer has not heard anything, but agreed to reach out to Yaima for an update. Pt has called Michelle multiple times with no response.    Called Yaima. No answer. LVM requesting a c/b ASAP. Also called Rosibel at [phone]. No answer. LVM requesting a c/b with an update ASAP.     Once  has an update, will call the pt. She is concerned this will not be approved and a PA will be needed for 4 caps a day as well.

## 2020-01-23 NOTE — TELEPHONE ENCOUNTER
Received return phone call from Rosibel who is covering for Yaima until she returns on Tuesday, 1/28. She informed this writer that the appeal was denied. She thinks the next step should be a peer to peer review and asked that writer provides her with a few days/times that will work for Dr. Robledo. Writer can call Rosibel with this information before Tuesday. If it's after Tuesday, writer should call Yaima.    Appeal reference #: [id number]

## 2020-01-27 ENCOUNTER — TELEPHONE (OUTPATIENT)
Dept: PSYCHIATRY | Facility: CLINIC | Age: 30
End: 2020-01-27

## 2020-01-27 DIAGNOSIS — F33.2 SEVERE EPISODE OF RECURRENT MAJOR DEPRESSIVE DISORDER, WITHOUT PSYCHOTIC FEATURES (H): ICD-10-CM

## 2020-01-27 RX ORDER — DEXTROAMPHETAMINE SACCHARATE, AMPHETAMINE ASPARTATE, DEXTROAMPHETAMINE SULFATE AND AMPHETAMINE SULFATE 5; 5; 5; 5 MG/1; MG/1; MG/1; MG/1
20 TABLET ORAL 2 TIMES DAILY
Qty: 60 TABLET | Refills: 0 | Status: SHIPPED | OUTPATIENT
Start: 2020-01-27 | End: 2020-01-29

## 2020-01-27 RX ORDER — DEXTROAMPHETAMINE SACCHARATE, AMPHETAMINE ASPARTATE, DEXTROAMPHETAMINE SULFATE AND AMPHETAMINE SULFATE 5; 5; 5; 5 MG/1; MG/1; MG/1; MG/1
20 TABLET ORAL 2 TIMES DAILY
Qty: 180 TABLET | Refills: 0 | Status: CANCELLED | OUTPATIENT
Start: 2020-01-27

## 2020-01-27 RX ORDER — LISDEXAMFETAMINE DIMESYLATE 70 MG/1
140 CAPSULE ORAL
Qty: 360 CAPSULE | Refills: 0 | Status: CANCELLED | OUTPATIENT
Start: 2020-01-27

## 2020-01-27 RX ORDER — LISDEXAMFETAMINE DIMESYLATE 70 MG/1
140 CAPSULE ORAL
Qty: 120 CAPSULE | Refills: 0 | Status: SHIPPED | OUTPATIENT
Start: 2020-01-27 | End: 2020-01-29

## 2020-01-27 NOTE — TELEPHONE ENCOUNTER
[facility] Call Center    Phone Message    May a detailed message be left on voicemail: yes    Reason for Call: Medication Refill Request    Has the patient contacted the pharmacy for the refill? Yes   Name of medication being requested: vyvanse 70mg, dextroamphetamine 10mg, aripiprazole 2mg, fluoxetine 40mg  Provider who prescribed the medication: Bret Robledo MD  Pharmacy:  [facility] #[id number] [address] AT [facility]  Date medication is needed: 1/28/20    Patient is going on vacation on 1/30/20 and requested vacation override for the medications listed above. Pharmacy called to request approval from Dr. Robledo. Caller from pharmacy said these were the only medications the patient mentioned but that there might be others that are needed.      Action Taken: Message routed to: Nursing pool

## 2020-01-27 NOTE — TELEPHONE ENCOUNTER
Bret Robledo MD Labossiere, Laura, RN   Caller: Unspecified (2 weeks ago)             Wednesday at 11:30 to 1:30.  I have a meeting at 1:30        Writer called Rosibel, as Yaima is out of office. No answer. LVM with reference number and time for peer to peer review. Requested a c/b to further coordinate this.

## 2020-01-27 NOTE — TELEPHONE ENCOUNTER
Writer reviewed pt's chart. Appears prescriptions were locally printed and were written only for 30 d/s. Will need to try 90 d/s prescriptions.

## 2020-01-27 NOTE — TELEPHONE ENCOUNTER
Bret Robledo MD  You 56 minutes ago (11:58 AM)      Have them page me.  I should be available then.        Writer called Rosibel back and provided clinic phone number. Informed her that provider will be paged at that time. If providers are unable to connect, asked that the provider or Rosibel call this writer.

## 2020-01-27 NOTE — TELEPHONE ENCOUNTER
Writer called the pharmacy to gather more information. Was informed by the pharmacist that the pt called requesting fills of the medications and that the vacation override should be approved.    When running the listed prescriptions, plus others under the patient's insurance, they are not being approved, as it's too early to fill. The pharmacist called insurance and was told it should be approved and good to go, but when she tried for a second time, it was still denied.    Agreed to call insurance to gather more information.    Initially called Michelle, primary  at [facility] who was working on the vacation override. No answer. LVM requesting a c/b.    Then called [facility] at general number. Connected with representative, Sofía. She will escalate this to her supervisors and have them call this writer directly. Reference #: [id number].    Called pt and LVM with an update.

## 2020-01-27 NOTE — TELEPHONE ENCOUNTER
Received return phone call from Rosibel. She received this writer's message and has since transferred the case to herself to set up the peer to peer.    She will provide the 1/29 date and time, but cannot guarantee that they will call on this day and at this time.     Per Rosibel, provider cannot call the provider at [facility]- they have to call the clinic.    Rosibel needs contact information for Dr. Robledo. If they are unable to reach him, she needs additional information, as to who the doctor can contact and gather more information from. Agreed to reach out to the provider and will then call her back with additional information.

## 2020-01-27 NOTE — TELEPHONE ENCOUNTER
Writer received incoming phone call from the pt asking to discontinue the Vyvanse 5 capsules a day and try 4 capsules a day, plus Adderall, as suggested by the provider. Pt somewhat upset on the phone as this process has taken over two months. Explained the reasoning behind the timeliness of her request and agreed to reach out to the provider about adjusting the prescription.

## 2020-01-27 NOTE — TELEPHONE ENCOUNTER
Bret Robledo MD Labossiere, Laura, RN   Caller: Unspecified (2 weeks ago)             We can try.  No easy solution.       Will ask that provider enter orders for Vyvanse 140 mg BID and Adderall so writer can start PA.

## 2020-01-28 ENCOUNTER — TELEPHONE (OUTPATIENT)
Dept: PSYCHIATRY | Facility: CLINIC | Age: 30
End: 2020-01-28

## 2020-01-28 DIAGNOSIS — F33.1 MAJOR DEPRESSIVE DISORDER, RECURRENT EPISODE, MODERATE (H): ICD-10-CM

## 2020-01-28 DIAGNOSIS — F33.2 MAJOR DEPRESSIVE DISORDER, RECURRENT, SEVERE WITHOUT PSYCHOTIC FEATURES (H): ICD-10-CM

## 2020-01-28 DIAGNOSIS — F33.2 SEVERE EPISODE OF RECURRENT MAJOR DEPRESSIVE DISORDER, WITHOUT PSYCHOTIC FEATURES (H): ICD-10-CM

## 2020-01-28 DIAGNOSIS — F33.1 MODERATE EPISODE OF RECURRENT MAJOR DEPRESSIVE DISORDER (H): ICD-10-CM

## 2020-01-28 DIAGNOSIS — F90.0 ATTENTION DEFICIT HYPERACTIVITY DISORDER (ADHD), PREDOMINANTLY INATTENTIVE TYPE: ICD-10-CM

## 2020-01-28 RX ORDER — BUPROPION HYDROCHLORIDE 300 MG/1
300 TABLET ORAL EVERY MORNING
Qty: 120 TABLET | Refills: 0 | Status: SHIPPED | OUTPATIENT
Start: 2020-01-28 | End: 2020-01-30

## 2020-01-28 RX ORDER — DEXTROAMPHETAMINE SULFATE 10 MG/1
10 TABLET ORAL 3 TIMES DAILY
Qty: 180 TABLET | Refills: 0 | Status: SHIPPED | OUTPATIENT
Start: 2020-01-28 | End: 2020-07-23

## 2020-01-28 RX ORDER — BUPROPION HYDROCHLORIDE 150 MG/1
150 TABLET ORAL EVERY MORNING
Qty: 120 TABLET | Refills: 0 | Status: SHIPPED | OUTPATIENT
Start: 2020-01-28 | End: 2020-01-30

## 2020-01-28 RX ORDER — FLUOXETINE 40 MG/1
CAPSULE ORAL
Qty: 240 CAPSULE | Refills: 0 | Status: SHIPPED | OUTPATIENT
Start: 2020-01-28 | End: 2020-01-30

## 2020-01-28 RX ORDER — ARIPIPRAZOLE 2 MG/1
2 TABLET ORAL DAILY
Qty: 120 TABLET | Refills: 0 | Status: SHIPPED | OUTPATIENT
Start: 2020-01-28 | End: 2020-01-30

## 2020-01-28 RX ORDER — LAMOTRIGINE 200 MG/1
400 TABLET ORAL DAILY
Qty: 240 TABLET | Refills: 0 | Status: SHIPPED | OUTPATIENT
Start: 2020-01-28 | End: 2020-01-30

## 2020-01-28 NOTE — TELEPHONE ENCOUNTER
Patient is returning call from nurse.  Patient can be reached at [phone].  It is ok to leave a detailed message.      Message routed to AFIA Davis

## 2020-01-28 NOTE — TELEPHONE ENCOUNTER
[facility] Call Center    Phone Message    May a detailed message be left on voicemail: yes    Reason for Call: Other: The pt called and asked to speak with Delia. She was unavailable at the time of the call. The pt requested a call back as soon as possible to discuss medication, and asked the writer to relay the following: Please do not cancel the appeal for 5 Vyyvanse per day.     Action Taken: Message routed to:  Other: [facility] Davis

## 2020-01-28 NOTE — TELEPHONE ENCOUNTER
Called the patient to receive an update. She called [employer] and sat on hold for 1 hour and 30 mins waiting to speak with Alexei. Per the pt, everything should be good to go with the medications, besides Vyvanse.     Called the pharmacy and was informed Abilify 2 mg and the bupropion (both dosages) are not going through for 120 days.     Called the pt back. She said it's fine that Abilify and bupropion are not going through for 120 days and believes everything should be resolved. No further action needed regarding psychiatric meds, except Vyvanse.

## 2020-01-28 NOTE — TELEPHONE ENCOUNTER
Writer called pt back to gather more information. She informed this writer that she called [employer] yesterday and spoke to representative, Alexei who was extremely helpful. She does not have Alexei's contact information, unfortunately.    At this time, the pt said the pharmacy needs new prescriptions for bupropion 150 mg + 300 mg, fluoxetine 40 mg, and Abilify 2 mg, Lamictal 200 mg- all for 120 days. Per Alexei, the pharmacy should be able to process the vacation override. Agreed to send these and call the pharmacy back.    Regarding Vyvanse 70 mg capsules, she's asking that Dr. Robledo continue with the peer to peer review for 5 capsules a day. If the provider does not approve this, then he should mention that she should be approved for at least 4 capsules a day, as she has been on this dose for 12 years (this information was provided by Alexie). Agreed to relay this to Dr. Robledo to prepare for tomorrow's peer to peer review.

## 2020-01-28 NOTE — TELEPHONE ENCOUNTER
Received incoming phone call from Kailee at [phone]. She is calling to confirm scheduled time for peer to peer review. Relayed that provider is still planning on tomorrow, 1/29 from 11:30 am to 1:30 pm. If provider is unable to do this for any reason, she will call with an update. The provider with [employer] will call the clinic tomorrow during that time frame.

## 2020-01-28 NOTE — TELEPHONE ENCOUNTER
Called pt to gather more information. She informed this writer that pharmacy needs new Rx for dextroamphetamine 10 mg tabs for a 60 d/s. Once this is sent to the pharmacy, Alexei with [employer] will call the pharmacy with her tech team and make sure the prescription goes through insurance. Agreed to reach out to the provider to send this electronically. Another provider may need to sign due to some issues with e-prescribing.

## 2020-01-28 NOTE — TELEPHONE ENCOUNTER
Called Yaima who is working on the appeal for Vyvanse. No answer LVM requesting a c/b about multiple medication concerns.    Called the pt and provided an update. She agreed to call [employer] herself and speak with member services, as writer was only able to connect with provider services.

## 2020-01-28 NOTE — TELEPHONE ENCOUNTER
Writer received 2 signed scripts for the below medications from provider. Both scripts [facility] to [id number] [address] AT [facility].      Disp Refills Start End DAVID   lisdexamfetamine (VYVANSE) 70 MG capsule 120 capsule 0 1/27/2020  No   Sig - Route: Take 2 capsules (140 mg) by mouth 2 times daily - Oral   Class: Local Print   Earliest Fill Date: 1/27/2020   Order: [id number]     amphetamine-dextroamphetamine (ADDERALL) 20 MG tablet 60 tablet 0 1/27/2020  --   Sig - Route: Take 1 tablet (20 mg) by mouth 2 times daily - Oral   Class: Local Print   Earliest Fill Date: 1/27/2020   Order: [id number]

## 2020-01-28 NOTE — TELEPHONE ENCOUNTER
"Called the pharmacy and spoke with Sudhir, pharmacist. He informed this writer that insurance is only allowing 93 d/s.     Called the pt and notified her of this. She asked that writer call [facility] at [phone]. Called [facility] and connected with representative, April who started vacation override process all over again. Explained that this should have been processed already and now two people (Michelle and Alexei) told us the vacation override of 120 days has been approved. Asked to speak to her supervisor.     April unable to connect writer with her supervisor, as the supervisor is \"still on a call.\" Continued to explain that vacation override should be complete. The only thing she can do at this time is send this information to her \"research team\" to look further into the approval of 120 days. Per April, someone from the team will c/b this afternoon.   "

## 2020-01-29 RX ORDER — LISDEXAMFETAMINE DIMESYLATE 70 MG/1
140 CAPSULE ORAL
Qty: 360 CAPSULE | Refills: 0 | Status: SHIPPED | OUTPATIENT
Start: 2020-01-29 | End: 2020-02-12

## 2020-01-29 RX ORDER — DEXTROAMPHETAMINE SACCHARATE, AMPHETAMINE ASPARTATE, DEXTROAMPHETAMINE SULFATE AND AMPHETAMINE SULFATE 5; 5; 5; 5 MG/1; MG/1; MG/1; MG/1
20 TABLET ORAL 2 TIMES DAILY
Qty: 180 TABLET | Refills: 0 | Status: SHIPPED | OUTPATIENT
Start: 2020-01-29 | End: 2020-03-09

## 2020-01-29 RX ORDER — LAMOTRIGINE 200 MG/1
TABLET ORAL
Qty: 60 TABLET | OUTPATIENT
Start: 2020-01-29

## 2020-01-29 NOTE — TELEPHONE ENCOUNTER
After discussing with the provider, he would like to try Vyvanse 140 mg BID + Adderall 20 mg BID. He asked that Dr. Cadet send prescriptions, as he's unable to e-prescribe at this time.    Dr. Cadet sent prescriptions. Called [facility] and spoke with representative, MIRTHA. She asked that writer call [employer], as both requests are for a qty limit override.     Called [employer] at [phone] and spoke with representative, Ritu who was very helpful. She was able to pull patient up and prescriptions that were sent today. Per Ritu, 90 d/s of Adderall 20 mg BID is going through insurance and is completely covered. Vyvanse 140 mg BID will need a vacation override and said it's likely this will be approved. She asked that the pharmacy call to request this as well. They can call at the number above. Confirmed that 5 capsules a day would need a new prior authorization.    Called the pt's pharmacy and asked that they call [employer] at the number above. Encouraged the pharmacist to do this ASAP, as it is an urgent request. She agreed to do so in the next two hours. Will c/b at that time.

## 2020-01-29 NOTE — TELEPHONE ENCOUNTER
Janny Mccord Laura, RN             Walter Lin,     This patient called about her vyvanse and said she needs to talk to you before you go home today. She can be reached at [phone].     Thanks,   Janny      Called pt. She was informed the PA was denied. Called pharmacy and [employer] and confirmed this as well. Insurance will not cover more than one capsule a day and it will be denied even for a 30 d/s. 90 d/s of one cap per day is covered.    Called pt and provided update. She voiced understanding. After much discussion, pt decided that it would be easiest to have her family ship medication to her every month. She plans on switching back to state insurance tonight and will go to the pharmacy [facility] to  Vyvanse 70 mg. Pt asks that provider prescribe a higher dose of Adderall to provide coverage when she runs out of the Vyvanse. HOMERO/consent for patient's parents emailed to patient for her completion/signature.    Discussed pt's request with the providers. They will not increase the pt's Adderall dose. Will discuss this with her tomorrow.

## 2020-01-29 NOTE — TELEPHONE ENCOUNTER
Notified that peer to peer was completed and it was denied. Called pt and notified her of this. She is asking for some sort of plan. For example: Vyvanse 4 capsules per day, another stimulant to replace the Vyvanse, or other recommendations. Pt not willing to find a provider in [place] or have medications shipped to her. Agreed to discuss this with Dr. Robledo and Dr. Cadet directly and then someone will call her back with an update.

## 2020-01-30 ENCOUNTER — TELEPHONE (OUTPATIENT)
Dept: PSYCHIATRY | Facility: CLINIC | Age: 30
End: 2020-01-30

## 2020-01-30 DIAGNOSIS — R63.5 WEIGHT GAIN: ICD-10-CM

## 2020-01-30 DIAGNOSIS — F33.1 MAJOR DEPRESSIVE DISORDER, RECURRENT EPISODE, MODERATE (H): ICD-10-CM

## 2020-01-30 DIAGNOSIS — F33.2 SEVERE EPISODE OF RECURRENT MAJOR DEPRESSIVE DISORDER, WITHOUT PSYCHOTIC FEATURES (H): ICD-10-CM

## 2020-01-30 DIAGNOSIS — F33.2 MAJOR DEPRESSIVE DISORDER, RECURRENT, SEVERE WITHOUT PSYCHOTIC FEATURES (H): ICD-10-CM

## 2020-01-30 DIAGNOSIS — F33.1 MODERATE EPISODE OF RECURRENT MAJOR DEPRESSIVE DISORDER (H): ICD-10-CM

## 2020-01-30 RX ORDER — ARIPIPRAZOLE 2 MG/1
2 TABLET ORAL DAILY
Qty: 90 TABLET | Refills: 0 | Status: SHIPPED | OUTPATIENT
Start: 2020-01-30 | End: 2020-04-14

## 2020-01-30 RX ORDER — LAMOTRIGINE 200 MG/1
400 TABLET ORAL DAILY
Qty: 180 TABLET | Refills: 0 | Status: SHIPPED | OUTPATIENT
Start: 2020-01-30 | End: 2020-04-28

## 2020-01-30 RX ORDER — FLUOXETINE 40 MG/1
CAPSULE ORAL
Qty: 180 CAPSULE | Refills: 0 | Status: SHIPPED | OUTPATIENT
Start: 2020-01-30 | End: 2020-04-28

## 2020-01-30 RX ORDER — BUPROPION HYDROCHLORIDE 150 MG/1
150 TABLET ORAL EVERY MORNING
Qty: 90 TABLET | Refills: 0 | Status: SHIPPED | OUTPATIENT
Start: 2020-01-30 | End: 2020-04-14

## 2020-01-30 RX ORDER — BUPROPION HYDROCHLORIDE 300 MG/1
300 TABLET ORAL EVERY MORNING
Qty: 90 TABLET | Refills: 0 | Status: SHIPPED | OUTPATIENT
Start: 2020-01-30 | End: 2020-04-14

## 2020-01-30 NOTE — TELEPHONE ENCOUNTER
Connected with Dr. Robledo. He agreed to emailing the 30 d/s scripts to the patient. If this does not work, original scripts will need to be given to patient's family to bring to pharmacy and fill.     Scripts emailed to patient at [email or web address] and held at writer's desk in the medication folder.

## 2020-01-30 NOTE — TELEPHONE ENCOUNTER
[facility] Call Center    Phone Message    May a detailed message be left on voicemail: yes    Reason for Call: Medication Question or concern regarding medication   Prescription Clarification  Name of Medication: all meds  Prescribing Provider: Bret Robledo MD   What on the order needs clarification? Patient is about to board a flight to [facility] and is taking off around 10am. She has a 12 hour layover in [facility]. She is concerned that her medications will be an issue when she goes through customs and is requesting copies of her scripts so she can find a pharmacy in [place] to fill her meds. If possible, she is requesting the scripts be emailed to her at [email or web address].          Action Taken: Message routed to:  Other: Nursing pool

## 2020-01-30 NOTE — TELEPHONE ENCOUNTER
Received incoming phone call from the pt. She is requesting scripts for all of her other, non-controlled medications. Writer printed these scripts and placed in provider's folder for signature.

## 2020-02-11 DIAGNOSIS — F90.0 ATTENTION DEFICIT HYPERACTIVITY DISORDER (ADHD), PREDOMINANTLY INATTENTIVE TYPE: ICD-10-CM

## 2020-02-11 NOTE — TELEPHONE ENCOUNTER
From: Muhumed, Yasmin   Sent: 2/10/2020  10:22 AM CST   To: ROLANDA Faulkner,     This is a pt of Dr. Baldwin. Pt has a question about her medication and would like you to call her dad, Gino @ [phone], to talk more about it.     Thank you,     Slime       From: Muhumed, Yasmin   Sent: 2/7/2020  11:11 AM CST   To: ROLANDA Faulkner,     Just got a call from pt mother, she said to call her back when you can, didn't say what it was regarding.     Thanks,     Slime Uribe [phone]

## 2020-02-11 NOTE — TELEPHONE ENCOUNTER
Patient father, Juan, called looking to speak to Delia. Provided writer with some details about what their calls have been about (although he noted that he was fairly sure she knew what was going on)    Patient is in the country of [place], and was unable to get her full ADHD medication prescription. They were working with Dr. Robledo to get and Adderall script to them so they can send that to [place] until they can get the Vyvanse script figured out.    Juan requesting a call back when Delia is back in clinic to provide any additional info/see what else is needed from them. OK to leave voicemail

## 2020-02-12 NOTE — TELEPHONE ENCOUNTER
Violette Gary, [facility]  Delia Osorio RN; Bret Robledo MD   Cc: Kathy Santana [facility]   Caller: Unspecified (Yesterday,  1:34 PM)             Hi all,     This is a tricky case. There really isn't good information on dose equivalencies between methylphenidates (Concerta/Ritalin) and Vyvanse either. Adderall would be more similar to Vyvanse than methylphenidate, but if we have to choose a methylphenidate, Concerta may provide better all day coverage.     Let me know if you have questions or would like to discuss.     Thanks,     Violette        Will confirm how provider would like to proceed.

## 2020-02-12 NOTE — TELEPHONE ENCOUNTER
Writer called Juan to gather more information. He informed this writer that pt is requesting Adderall to bridge the few days/week without Vyvanse while she's waiting for it to be shipped to [place]. Writer agreed to discuss with dad and explained there is no evidence/documentation regarding equivalent doses of Adderall and Vyvanse and provider may not want to prescribe high dose of Adderall. Dad voiced understanding.    Juan also mentioned pt would be willing to try Ritalin or Concerta, which is available in [place] if the provider does not want to prescribe Adderall.     Lastly, dad was wondering when family can  the Vyvanse. Tried reviewing [place] , but it was not working at time of search. Called [facility] and was told last refill was on 1/29/20 for 30 d/s.     Agreed to discuss all of the above with the provider and pharm D and will then call dad back with a plan. Juan voiced understanding.

## 2020-02-12 NOTE — TELEPHONE ENCOUNTER
[facility] Call Center    Phone Message    May a detailed message be left on voicemail: yes     Reason for Call: Other: Pt's father Juan called to speak Delia regarding coordinating her medications while out of the country.      Action Taken: Other: [facility] Psych Pool    Travel Screening: Not Applicable

## 2020-02-12 NOTE — TELEPHONE ENCOUNTER
Bret Robleod MD Labossiere, Laura, RN   Caller: Unspecified (Yesterday,  1:34 PM)             Yes and yes.       Provider approved of additional Vyvanse refills. Pended three months worth and routed to provider for signature. Start dates 2/26/20, 3/25/20, and 4/22/20.     Will reach out to pharm D for guidance regarding Adderall per provider's request.

## 2020-02-14 NOTE — TELEPHONE ENCOUNTER
Bret Robledo MD Labossiere, Laura, RN   Caller: Unspecified (3 days ago,  1:34 PM)             Lets use the 25 mg tabs        Writer pended Adderall ER/XR 25 mg tabs for provider to sign, along with Vyvanse scripts.

## 2020-02-14 NOTE — TELEPHONE ENCOUNTER
Called pt's father, Juan and provided an update. Informed him that provider has chosen Adderall, but the team is still working on finding an appropriate dose. Also informed dad that provider agreed to refill additional Vyvanse scripts and the soonest they can  another script is 2/26.     Agreed to call Juan once Vyvanse script is sent to the pharmacy and once the provider and pharm D decide on a dose for the Adderall. Dad voiced understanding and will update the pt as well.

## 2020-02-14 NOTE — TELEPHONE ENCOUNTER
Bret Robledo MD Labossiere, Laura, RN   Caller: Unspecified (3 days ago,  1:34 PM)             Lets get a script of Adderal 50 mg bid        This will be a script for Adderall 30 mg and Adderall 20 mg. Will confirm if this is how provider would like to proceed.

## 2020-02-14 NOTE — TELEPHONE ENCOUNTER
Though specific equivalencies between Vyvanse and Adderall are not defined by the FDA, I was able to find three references that report the same information for a rough equivalency, as follows.    Vyvanse conversion calculation is reported to be 0.2948 to dextroamphetamine.  Adderall is 3:1 dextroamphetamine:amphetamine (75% dextroamphetamine)    Per chart review, pt takes Vyvanse 140mg BID (total 280mg daily)  Vyvanse 280mg * 0.2948 = 82.5mg dextroamphetamine  Dextroamphetamine 82.5mg/0.75 (dextro:amph ratio) = Adderall 110mg daily    Https://www.Compare And Sharecap.org/uploads/3/2/5/0/1818714/stimulant_equivalency.pdf  Https://psychopharmacopeia.com/stimulant_conversion.php  Https://www.acponline.org/system/files/documents/about_acp/chapters/co/15mtg/schneck2.pdf    Kathy Santana, PharmD  Medication Therapy Management Pharmacist  [facility]  Phone: [phone]

## 2020-02-14 NOTE — TELEPHONE ENCOUNTER
Patient father, Juan, calling hoping for an update. Writer unable to connect him with anyone in clinic, but informed him that a message would be sent informing his daughter's care team that he had called.

## 2020-02-17 ENCOUNTER — TELEPHONE (OUTPATIENT)
Dept: PSYCHIATRY | Facility: CLINIC | Age: 30
End: 2020-02-17

## 2020-02-17 RX ORDER — LISDEXAMFETAMINE DIMESYLATE 70 MG/1
140 CAPSULE ORAL 2 TIMES DAILY
Qty: 120 CAPSULE | Refills: 0 | Status: SHIPPED | OUTPATIENT
Start: 2020-02-26 | End: 2020-04-27

## 2020-02-17 RX ORDER — LISDEXAMFETAMINE DIMESYLATE 70 MG/1
140 CAPSULE ORAL
Qty: 120 CAPSULE | Refills: 0 | Status: SHIPPED | OUTPATIENT
Start: 2020-03-25 | End: 2020-04-27

## 2020-02-17 RX ORDER — LISDEXAMFETAMINE DIMESYLATE 70 MG/1
140 CAPSULE ORAL
Qty: 120 CAPSULE | Refills: 0 | Status: SHIPPED | OUTPATIENT
Start: 2020-04-22 | End: 2020-04-27

## 2020-02-17 RX ORDER — DEXTROAMPHETAMINE SACCHARATE, AMPHETAMINE ASPARTATE MONOHYDRATE, DEXTROAMPHETAMINE SULFATE AND AMPHETAMINE SULFATE 6.25; 6.25; 6.25; 6.25 MG/1; MG/1; MG/1; MG/1
50 CAPSULE, EXTENDED RELEASE ORAL 2 TIMES DAILY
Qty: 120 CAPSULE | Refills: 0 | Status: SHIPPED | OUTPATIENT
Start: 2020-02-17 | End: 2020-02-19

## 2020-02-17 NOTE — TELEPHONE ENCOUNTER
[facility] Call Center    Phone Message    May a detailed message be left on voicemail: yes     Reason for Call: Call Back    DadChristian, called looking for an update on the prescription issue they've been working on.      Action Taken: Message routed to:  Other: nursing pool    Travel Screening: Not Applicable

## 2020-02-19 RX ORDER — DEXTROAMPHETAMINE SACCHARATE, AMPHETAMINE ASPARTATE MONOHYDRATE, DEXTROAMPHETAMINE SULFATE AND AMPHETAMINE SULFATE 6.25; 6.25; 6.25; 6.25 MG/1; MG/1; MG/1; MG/1
50 CAPSULE, EXTENDED RELEASE ORAL 2 TIMES DAILY
Qty: 60 CAPSULE | Refills: 0 | Status: SHIPPED | OUTPATIENT
Start: 2020-03-17 | End: 2020-03-09

## 2020-02-19 NOTE — TELEPHONE ENCOUNTER
Received incoming phone call from Christian, pt's father. He went to the pharmacy yesterday and was able to fill #60 of Adderall, not the full #120. The pharmacist also told him he won't be able to refill #60 until one month later. Although, he believes this will be enough to keep the pt covered during the gap of Vyvanse refills. Christian is asking that writer send one additional refill for #60 with start date of 3/17. Writer agreed with this plan.    Order pended and routed to the provider. Will notify dad once this is signed.

## 2020-02-19 NOTE — TELEPHONE ENCOUNTER
Provider signed prescription. Called pt's father and LVM notifying him of this. No further action needed.

## 2020-02-22 DIAGNOSIS — F33.2 SEVERE EPISODE OF RECURRENT MAJOR DEPRESSIVE DISORDER, WITHOUT PSYCHOTIC FEATURES (H): ICD-10-CM

## 2020-02-22 DIAGNOSIS — F33.2 MAJOR DEPRESSIVE DISORDER, RECURRENT, SEVERE WITHOUT PSYCHOTIC FEATURES (H): ICD-10-CM

## 2020-02-22 DIAGNOSIS — F33.1 MODERATE EPISODE OF RECURRENT MAJOR DEPRESSIVE DISORDER (H): ICD-10-CM

## 2020-02-22 DIAGNOSIS — F33.1 MAJOR DEPRESSIVE DISORDER, RECURRENT EPISODE, MODERATE (H): ICD-10-CM

## 2020-02-24 RX ORDER — LAMOTRIGINE 200 MG/1
TABLET ORAL
Qty: 240 TABLET | OUTPATIENT
Start: 2020-02-24

## 2020-02-24 RX ORDER — BUPROPION HYDROCHLORIDE 150 MG/1
TABLET ORAL
Qty: 120 TABLET | OUTPATIENT
Start: 2020-02-24

## 2020-02-24 RX ORDER — ACETYLCYSTEINE 600 MG
CAPSULE ORAL
Qty: 60 CAPSULE | OUTPATIENT
Start: 2020-02-24

## 2020-02-24 RX ORDER — FLUOXETINE 40 MG/1
CAPSULE ORAL
Qty: 240 CAPSULE | OUTPATIENT
Start: 2020-02-24

## 2020-03-09 ENCOUNTER — TELEPHONE (OUTPATIENT)
Dept: PSYCHIATRY | Facility: CLINIC | Age: 30
End: 2020-03-09

## 2020-03-09 DIAGNOSIS — F90.0 ATTENTION DEFICIT HYPERACTIVITY DISORDER (ADHD), PREDOMINANTLY INATTENTIVE TYPE: Primary | ICD-10-CM

## 2020-03-09 DIAGNOSIS — F90.0 ATTENTION DEFICIT HYPERACTIVITY DISORDER (ADHD), PREDOMINANTLY INATTENTIVE TYPE: ICD-10-CM

## 2020-03-09 RX ORDER — LISDEXAMFETAMINE DIMESYLATE 70 MG/1
140 CAPSULE ORAL 2 TIMES DAILY
Qty: 120 CAPSULE | Refills: 0 | Status: SHIPPED | OUTPATIENT
Start: 2020-04-09 | End: 2020-04-27

## 2020-03-09 RX ORDER — LISDEXAMFETAMINE DIMESYLATE 70 MG/1
140 CAPSULE ORAL 2 TIMES DAILY
Qty: 30 CAPSULE | Refills: 0 | Status: SHIPPED | OUTPATIENT
Start: 2020-04-09 | End: 2020-04-27

## 2020-03-09 RX ORDER — LISDEXAMFETAMINE DIMESYLATE 70 MG/1
140 CAPSULE ORAL 2 TIMES DAILY
Qty: 30 CAPSULE | Refills: 0 | Status: SHIPPED | OUTPATIENT
Start: 2020-05-10 | End: 2020-04-27

## 2020-03-09 RX ORDER — DEXTROAMPHETAMINE SACCHARATE, AMPHETAMINE ASPARTATE MONOHYDRATE, DEXTROAMPHETAMINE SULFATE AND AMPHETAMINE SULFATE 6.25; 6.25; 6.25; 6.25 MG/1; MG/1; MG/1; MG/1
50 CAPSULE, EXTENDED RELEASE ORAL 2 TIMES DAILY
Qty: 120 CAPSULE | Refills: 0 | Status: SHIPPED | OUTPATIENT
Start: 2020-03-17 | End: 2020-03-09

## 2020-03-09 RX ORDER — DEXTROAMPHETAMINE SACCHARATE, AMPHETAMINE ASPARTATE MONOHYDRATE, DEXTROAMPHETAMINE SULFATE AND AMPHETAMINE SULFATE 6.25; 6.25; 6.25; 6.25 MG/1; MG/1; MG/1; MG/1
50 CAPSULE, EXTENDED RELEASE ORAL 2 TIMES DAILY
Qty: 120 CAPSULE | Refills: 0 | Status: SHIPPED | OUTPATIENT
Start: 2020-03-17 | End: 2020-10-22

## 2020-03-09 RX ORDER — DEXTROAMPHETAMINE SACCHARATE, AMPHETAMINE ASPARTATE, DEXTROAMPHETAMINE SULFATE AND AMPHETAMINE SULFATE 5; 5; 5; 5 MG/1; MG/1; MG/1; MG/1
20 TABLET ORAL 2 TIMES DAILY
Qty: 180 TABLET | Refills: 0 | Status: SHIPPED | OUTPATIENT
Start: 2020-03-09 | End: 2020-10-22

## 2020-03-09 RX ORDER — LISDEXAMFETAMINE DIMESYLATE 70 MG/1
140 CAPSULE ORAL
Qty: 120 CAPSULE | Refills: 0 | Status: SHIPPED | OUTPATIENT
Start: 2020-05-10 | End: 2020-04-27

## 2020-03-09 RX ORDER — LISDEXAMFETAMINE DIMESYLATE 70 MG/1
140 CAPSULE ORAL 2 TIMES DAILY
Qty: 120 CAPSULE | Refills: 0 | Status: SHIPPED | OUTPATIENT
Start: 2020-03-09 | End: 2020-07-23

## 2020-03-09 NOTE — LETTER
5/10/2020        To Whom it May Concern:        Lexus Uribe ( 1990) is a patient that is currently under my care.  It is imperative that any medications and printed documents, including lisdexamfetamine (VYVANSE) 70 MG capsule, for [facility]us that are currently being held be released to [facility].     Lexus's address is:     Lex[place] Uribe  C/o ,. [address]  [address] [place], [place] [id number] [place]              Sincerely,              Bret Robledo MD

## 2020-03-09 NOTE — LETTER
3/9/2020      To Whom it May Concern:      Lexus Uribe ( 1990) is a patient that is currently under my care.  It is imperative that any medications and printed documents, including lisdexamfetamine (VYVANSE) 70 MG capsule and amphetamine-dextroamphetamine 25 MG PO 24 hr capsule, for [facility] that are currently being held be released to [place].    Lex[facility]'s address is:    Lexus Uribe  C/o ,. Erend[address] Salas  [address] [place] [address]          Sincerely,          Bret Robledo MD

## 2020-03-09 NOTE — TELEPHONE ENCOUNTER
[facility] Call Center    Phone Message    May a detailed message be left on voicemail: yes     Reason for Call: Medication Question or concern regarding medication   Prescription Clarification  Name of Medication: Vyvanse  Prescribing Provider: Bret Robledo   What on the order needs clarification? Per patient's father, patient is currently out of the country on an extended stay.  Her medications have been held at [employer] for the past 3-4 weeks.  Dad is requesting copies of prescriptions and letter from Dr. Robledo so that patient's medications can be released from [employer].        Action Taken: Message routed to:  Other: Psychiatry Nurse Pool

## 2020-03-09 NOTE — LETTER
2020        To Whom it May Concern:        [facility] Uribe ( 1990) is a patient that is currently under my care.  It is imperative that any medications and printed documents, including lisdexamfetamine (VYVANSE) 70 MG capsule, for [facility] that are currently being held be released to [facility].     Lex[facility]'s address is:     Lexus Uribe  C[address] ,. [address]  [address] [place], [place] [id number] [place]              Sincerely,              Bret Robledo MD

## 2020-03-09 NOTE — TELEPHONE ENCOUNTER
Writer created letters for fill dates of Vyvanse and updated scripts, routed to provider for signing.

## 2020-03-26 DIAGNOSIS — F33.2 MAJOR DEPRESSIVE DISORDER, RECURRENT, SEVERE WITHOUT PSYCHOTIC FEATURES (H): ICD-10-CM

## 2020-03-26 RX ORDER — ARIPIPRAZOLE 2 MG/1
TABLET ORAL
Qty: 120 TABLET | OUTPATIENT
Start: 2020-03-26

## 2020-04-10 DIAGNOSIS — F33.2 MAJOR DEPRESSIVE DISORDER, RECURRENT, SEVERE WITHOUT PSYCHOTIC FEATURES (H): ICD-10-CM

## 2020-04-10 DIAGNOSIS — F33.1 MAJOR DEPRESSIVE DISORDER, RECURRENT EPISODE, MODERATE (H): ICD-10-CM

## 2020-04-14 RX ORDER — BUPROPION HYDROCHLORIDE 300 MG/1
300 TABLET ORAL EVERY MORNING
Qty: 60 TABLET | Refills: 0 | Status: SHIPPED | OUTPATIENT
Start: 2020-04-14 | End: 2020-08-11

## 2020-04-14 RX ORDER — BUPROPION HYDROCHLORIDE 150 MG/1
TABLET ORAL
Qty: 60 TABLET | Refills: 0 | Status: SHIPPED | OUTPATIENT
Start: 2020-04-14 | End: 2020-08-14

## 2020-04-14 RX ORDER — ARIPIPRAZOLE 2 MG/1
TABLET ORAL
Qty: 60 TABLET | Refills: 0 | Status: SHIPPED | OUTPATIENT
Start: 2020-04-14 | End: 2020-06-18

## 2020-04-14 RX ORDER — ARIPIPRAZOLE 10 MG/1
TABLET ORAL
Qty: 30 TABLET | Refills: 2 | OUTPATIENT
Start: 2020-04-14

## 2020-04-14 NOTE — TELEPHONE ENCOUNTER
ARIPiprazole (ABILIFY) 2 MG   Last refilled: 1/30/20  Qty: 90    buPROPion (WELLBUTRIN XL) 150 MG  & 300 MG   Last refilled   1/30/20  Qty: 90      Last seen:  12/26/19  RTC: 3  MOS  Cancel: 0  No-show: 0  Next appt: 6/11/20    Refill pended and routed to the provider for review/determination due to Pt outside of RTC timeframe   OK FOR RF UNTIL PENDING APPT? 2 MOS   *   Medications: No changes

## 2020-04-27 DIAGNOSIS — F90.0 ATTENTION DEFICIT HYPERACTIVITY DISORDER (ADHD), PREDOMINANTLY INATTENTIVE TYPE: ICD-10-CM

## 2020-04-27 DIAGNOSIS — F33.2 SEVERE EPISODE OF RECURRENT MAJOR DEPRESSIVE DISORDER, WITHOUT PSYCHOTIC FEATURES (H): ICD-10-CM

## 2020-04-27 DIAGNOSIS — F33.1 MODERATE EPISODE OF RECURRENT MAJOR DEPRESSIVE DISORDER (H): ICD-10-CM

## 2020-04-27 RX ORDER — LISDEXAMFETAMINE DIMESYLATE 70 MG/1
140 CAPSULE ORAL 2 TIMES DAILY
Qty: 120 CAPSULE | Refills: 0 | Status: SHIPPED | OUTPATIENT
Start: 2020-04-27 | End: 2020-07-23

## 2020-04-27 RX ORDER — LISDEXAMFETAMINE DIMESYLATE 70 MG/1
140 CAPSULE ORAL
Qty: 120 CAPSULE | Refills: 0 | Status: SHIPPED | OUTPATIENT
Start: 2020-05-25 | End: 2020-07-23

## 2020-04-27 NOTE — TELEPHONE ENCOUNTER
Called previous pharmacy, [facility] on [address]. Confirmed the only two Vyvanse scripts they have on file are from December and one with a start date of 4/22. Asked that both are cancelled/discontinued, as pt will be transferring pharmacies. [place] and parents are picking up all medications and mailing to patient.    Per [place] , Vyvanse last filled on 3/26/20, 2/28/20, and 1/29/20.     New orders pended for April and May and routed to provider for signature.

## 2020-04-27 NOTE — TELEPHONE ENCOUNTER
[facility] Call [facility]    Phone Message    May a detailed message be left on voicemail: yes     Reason for Call: Other: Pt stated she needs to change her pharmacy and needs a new prescription sent for her Vyvanse and Lisdexamfetamine. The new pharmacy is at [facility] on [address] in [facility]     Action Taken: Other: [facility]    Travel Screening: Not Applicable

## 2020-04-28 RX ORDER — FLUOXETINE 40 MG/1
80 CAPSULE ORAL DAILY
Qty: 60 CAPSULE | Refills: 1 | Status: SHIPPED | OUTPATIENT
Start: 2020-04-28 | End: 2020-06-18

## 2020-04-28 RX ORDER — LAMOTRIGINE 200 MG/1
400 TABLET ORAL DAILY
Qty: 60 TABLET | Refills: 1 | Status: SHIPPED | OUTPATIENT
Start: 2020-04-28 | End: 2020-06-18

## 2020-04-28 NOTE — TELEPHONE ENCOUNTER
Medication requested:   lamoTRIgine (LAMICTAL) 200 MG tablet         FLUoxetine (PROZAC) 40 MG capsule   Last refilled: 1/30/20  Qty: 180      Last seen: 12/26/19  RTC: 3 months  Cancel: 0  No-show: 0  Next appt: 6/11/20    Refill decision:   Refill pended and routed to the provider for review/determination due to   Pt outside of RTC timeframe.

## 2020-06-01 ENCOUNTER — TELEPHONE (OUTPATIENT)
Dept: PSYCHIATRY | Facility: CLINIC | Age: 30
End: 2020-06-01

## 2020-06-01 NOTE — TELEPHONE ENCOUNTER
Writer received incoming phone call from the pt requesting a referral to [place] psychiatric services. Pt said both she and the provider feel stuck with her current regimen and she's interested in meeting with a care team at [place] [facility] to be evaluated. She's requesting a referral from the [facility] team.     Pt provided the following diagnosis codes: F41.1 and F32.9.     Agreed to reach out to the provider and then start the referral process.

## 2020-06-01 NOTE — LETTER
Melody 15, 2020    RE: Lexus Uribe  : 1990  [address]  [place] [address]  Phone: [phone]  Member #: [id number]      To Whom it May Concern,    This request is in response to the denial of coverage for out of network services at [facility]. The request was denied, as the patient can receive similar services in network, and out of network services will only be covered if the service is considered an emergency. We kindly ask that you reconsider the decision to deny coverage of the requested service.    Lexus Uribe has been under my care at the [facility] for approximately seven to eight years. She has psychiatric diagnoses of Generalized Anxiety Disorder (F41.1) and Major Depressive Disorder (F32.9). Lexus continues to struggle with symptoms associated with these diagnoses, despite her extensive medication regimen. Some of her more prominent symptoms include, anhedonia, lack of energy, sleep disturbances, lack of concentration, mood lability, trichotillomania, heavy alcohol use, and suicidal ideation.     While being treated at the [facility], Lexus has tried and failed numerous medications. She takes high doses of stimulants and other psychiatric medications from many other drug classes. Unfortunately, her symptoms continue to be debilitating. It's my hope that referring her to the [facility], we can receive new recommendations or treatment options that she has not yet tried.     Of note, Lexus has been treated by many other psychiatrists at multiple locations. This includes:  Dr. Angelo Hernandez, Dr. Tami Hernandez, Dr. Fernanda Huggins, Dr. Bret Borja, and three psychiatrists during a stay in residential treatment in [place]. Unfortunately, Lexus is running out of options. She has not yet been seen at [facility], which is a very reputable health system. I'm concerned that denying her this  service will cause ongoing symptoms or lead to worsening symptoms.        Please take this into consideration, along with the attached medical records when reviewing our request to appeal your decision. If you have any further questions, you can contact the clinic at the number above.    Sincerely,      Kannan Guzman RN on behalf of Bret Robledo MD

## 2020-06-02 NOTE — TELEPHONE ENCOUNTER
[facility]. Confirmed that pt is already a pt of [facility] as well. Her patient number is [id number].     Pt is scheduled for 6/29 at 8:30 am to meet with a psychiatrist. Per the clinic, they can see all of the pt's records via Care Everywhere. Only remaining issue at this time is insurance. Pt's current plan will need a PA. Although, writer remembers patient mentioning this and that she will likely try to sort this out before her eval.     Called pt and LVM providing update with the above information. Requested a c/b if writer and provider need to send PA referral to current insurance.

## 2020-06-02 NOTE — TELEPHONE ENCOUNTER
Bret Robledo MD Labossiere, Laura, RN    Caller: Unspecified (Yesterday,  2:55 PM)               OK to refer.

## 2020-06-04 NOTE — TELEPHONE ENCOUNTER
"Called [employer] and received the following instructions to complete the referral form:    1. [email or web address]  2.Click on \"provider forms\"  3. Click on \"behavioral health\"  4. Click on \"In-Network benefit request form\"  5. Print form and complete.   6. Fax to [employer] at [phone]    Writer completed steps 1-5. Will scan and e-mail to staff in clinic to print completed form and fax to  at the number above.     Will ask that 2/23/15, 6/2816, 3/6/17, 4/12/18, 4/4/19, and 12/26/19 notes are attached as well.    [facility] phone: [phone].  "

## 2020-06-04 NOTE — TELEPHONE ENCOUNTER
Received return phone call from the pt. She confirmed that a referral to insurance is needed and apologized for the miscommunication. She provided the following phone number to contact for [employer] insurance: [phone].

## 2020-06-05 ENCOUNTER — TELEPHONE (OUTPATIENT)
Dept: PSYCHIATRY | Facility: CLINIC | Age: 30
End: 2020-06-05

## 2020-06-05 NOTE — TELEPHONE ENCOUNTER
On 6/5/2020 office visit notes from 2/23/15, 6/2816, 3/6/17, 4/12/18, 4/4/19, and 12/26/19 were faxed to [facility] at [phone], by Faina Bae CMA per fax cover sheet.  I sent the original to scanning, and held a copy in psychiatry until scanning is complete and routed this to Delia Bae CMA

## 2020-06-10 NOTE — TELEPHONE ENCOUNTER
Audrey Bae CMA Labossiere, Laura, RN            Previous Messages     ----- Message -----   From: Ratna Bowens   Sent: 6/10/2020   9:05 AM CDT   To: Audrey Bae CMA   Subject: Call from [facility],       Can you please call back Heather from [employer]. She's calling about a fax she received about network coverage requested by . The request has been denied and there should be a fax coming about it for it as well.     Denial #[id number]   Appeal - [phone]   Call back Heather w/ questions - [phone]       Ratna KHAN

## 2020-06-10 NOTE — TELEPHONE ENCOUNTER
Writer called Heather at the number provided. She confirmed the request was denied and asked that writer call [phone] to start the appeal process. She believes it could take up to 14 days for the appeal dept to process the pt's appeal request.    Called the above number and spoke with pt rep, Pamela. She's uncertain why Heather provided the customer service number, but was able to answer this writer's questions. She said there are no additional forms that need to be completed and instead, writer just needs to provide additional information and documentation, as to why the pt should be seen at [facility]. This information can be faxed to the appeals department at [phone].    Per Pamela, the initial referral was denied because the pt can receive care in network. Services would only be covered if they are considered an emergency, which it is not. Will discuss with the pt further, as an appeal will likely be denied as well.    Called pt. No answer. LVM with an update and requested a c/b. Information routed to provider as FYI.

## 2020-06-12 NOTE — TELEPHONE ENCOUNTER
Received return phone call from the pt. She would like to go forward with the appeal process. Gathered additional information that will be valuable to try and get the decision overturned. Pt provided the following information:    -Under Dr. Robledo's care for 7 years  -Past psychiatrists include:   Dr. Angelo Borja  -Residential stay in [place], where she had three different psychiatrists    Writer and pt had to terminate the phone call due to a bad connection. She asked that writer e-mail her instead so she can provide additional information. Writer sent e-mail to pt via psychiatry intake e-mail. Currently awaiting a response. Will start appeal letter in the meantime.

## 2020-06-15 NOTE — PROGRESS NOTES
----- Message from Brendan Cuellar MD sent at 6/15/2020  7:40 AM CDT -----  Please inform parent that lab work is stable/improved from previous.  Continue with current medications.   Behavioral Activation Psychotherapy Progress Note       [facility]  [address], [address]  [place] [address]       Name: Lexus Uribe  MRN#: [id number]  Age: 29 year old  YOB: 1990  Date: July 25, 2019  Duration: 40 minutes (Patient arrived late: starting time = 8:30 AM; stopping time = 9:10 AM)      Content of Psychotherapy Session:  Discussed improvements in mood and interpersonal context at work. Focused on precipitants of overeating and provided psychoeducation about the complexity of weight/eating in the context of her self-blame. Reviewed precipitants of eating and options including structured weight loss program, CBT with behavioral experiments, and surgery (which she does not want to repeat). Agreed to collect data and pursue structured weight loss program down the line with clarity about psychological precipitants of overeating.      Current Symptoms  Intermittent dysphoria, self-criticism, guilt, dysphoria, anxiety, worry, ruminative thoughts, and overvaluation of shape/weight; intermittent overeating (no binge eating). Ms. Uribe denies suicidal ideation.      Mental Status Exam:  Appearance: appropriate  Behavior: normal  Eye contact: normal  Speech: normal  Mood: anxious, tearful  Affect: mood congruent  Thought process: clear  Suicidal ideation: denies  Hallucinations: none     Diagnosis  Major depression, recurrent, severe, without psychotic symptoms F33.2  Eating disorder (current binge eating disorder, past bulimia nervosa) F50.81  ADHD, F90.0  Alcohol abuse, currently in early remission, F10.11     Plan  Continue CBT sessions with a focus binge eating and overvaluation of shape/weight as well as depression.     Signed:  Anayeli Andrade, Ph.D.    Department of Psychiatry

## 2020-06-17 DIAGNOSIS — F33.1 MODERATE EPISODE OF RECURRENT MAJOR DEPRESSIVE DISORDER (H): ICD-10-CM

## 2020-06-18 DIAGNOSIS — F33.2 SEVERE EPISODE OF RECURRENT MAJOR DEPRESSIVE DISORDER, WITHOUT PSYCHOTIC FEATURES (H): ICD-10-CM

## 2020-06-18 DIAGNOSIS — F33.2 MAJOR DEPRESSIVE DISORDER, RECURRENT, SEVERE WITHOUT PSYCHOTIC FEATURES (H): ICD-10-CM

## 2020-06-18 NOTE — TELEPHONE ENCOUNTER
Medication requested:    FLUOXETINE 40MG CAPSULES   Last refilled:   5/26/20  Qty: 60       Last seen: 12/26/20  RTC: 3 months  Cancel: 1  No-show: 0  Next appt: 0     Refill decision:   Refill pended and routed to the provider for review/determination due to   Cancel x 1  Pt outside of RTC timeframe.  Scheduling has been notified to contact the pt for appointment.

## 2020-06-18 NOTE — TELEPHONE ENCOUNTER
Medication requested:   ARIPiprazole (ABILIFY) 2 MG tablet     lamoTRIgine (LAMICTAL) 200 MG tablet   Last refilled:   4/27/20 5/25/20  Qty: 60      Last seen: 12/26/20  RTC: 3 months  Cancel: 1  No-show: 0  Next appt: 0    Refill decision:   Refill pended and routed to the provider for review/determination due to   Cancel x 1  Pt outside of RTC timeframe.  Scheduling has been notified to contact the pt for appointment.

## 2020-06-19 RX ORDER — ARIPIPRAZOLE 2 MG/1
2 TABLET ORAL DAILY
Qty: 30 TABLET | Refills: 0 | Status: SHIPPED | OUTPATIENT
Start: 2020-06-19 | End: 2020-07-17

## 2020-06-19 RX ORDER — FLUOXETINE 40 MG/1
80 CAPSULE ORAL DAILY
Qty: 60 CAPSULE | Refills: 0 | Status: SHIPPED | OUTPATIENT
Start: 2020-06-19 | End: 2020-07-17

## 2020-06-19 RX ORDER — LAMOTRIGINE 200 MG/1
400 TABLET ORAL DAILY
Qty: 60 TABLET | Refills: 0 | Status: SHIPPED | OUTPATIENT
Start: 2020-06-19 | End: 2020-07-17

## 2020-06-22 NOTE — TELEPHONE ENCOUNTER
Provider did not sign letter. Fellow RNCC, Kannan Guzman will sign letter on behalf of provider tomorrow. Edits made to letter reflecting this.    Once letter is signed, it can be faxed to [employer] Appeals Dept at [phone], along with office visit notes.

## 2020-06-23 NOTE — TELEPHONE ENCOUNTER
Writer faxed letter and office visit notes from 2/23/15, 6/28/16, 3/6/17, 4/12/18, 4/4/19, and 12/26/19 to [employer] Appeal Dept at [phone].

## 2020-06-24 ENCOUNTER — TELEPHONE (OUTPATIENT)
Dept: PSYCHIATRY | Facility: CLINIC | Age: 30
End: 2020-06-24

## 2020-06-24 NOTE — TELEPHONE ENCOUNTER
[facility] Call Center    Phone Message    May a detailed message be left on voicemail: yes     Reason for Call: Other: Elton from [employer] was following up about letter sent on behalf of . She wanted to make sure there wasn' t any additional information that should be provided before they have it reviewed. Ok to [facility] if she's not available.     Action Taken: Other: [facility]    Travel Screening: Not Applicable

## 2020-06-24 NOTE — TELEPHONE ENCOUNTER
Called Elton. No answer. LVM informing her that no further information needs to be sent and she should start the review process.

## 2020-06-25 NOTE — TELEPHONE ENCOUNTER
Received incoming phone call from Elton with [employer]. She said her supervisor with [employer] reviewed the referral appeal and approved the pt for a one time evaluation. For ongoing treatment, the pt will need to return to in network.     Called pt and LVM notifying her of this.

## 2020-06-25 NOTE — TELEPHONE ENCOUNTER
received incoming phone call from pt. She received 's VM and was very appreciative. Pt called [facility] and was told they don't have access to Dr. Robledo's records, although  was told otherwise.    Called [facility] again and confirmed they can see Dr. Robledo's office visit notes in Care Everywhere.     Called pt and LVM notifying her of this.

## 2020-06-29 ENCOUNTER — TRANSFERRED RECORDS (OUTPATIENT)
Dept: HEALTH INFORMATION MANAGEMENT | Facility: CLINIC | Age: 30
End: 2020-06-29

## 2020-07-01 ENCOUNTER — TELEPHONE (OUTPATIENT)
Dept: PSYCHIATRY | Facility: CLINIC | Age: 30
End: 2020-07-01

## 2020-07-01 NOTE — TELEPHONE ENCOUNTER
Prior Authorization Retail Medication Request  RENEWAL  Medication/Dose: lisdexamfetamine (VYVANSE) 70 MG capsule   ICD code (if different than what is on RX): Attention deficit hyperactivity disorder (ADHD), predominantly inattentive type [F90.0]   Previously Tried and Failed:  See 6/17/19 encounter for previous approval  Rationale:  See 6/17/19 encounter for previous approval    Insurance Name:  [employer]   Insurance ID:  [id number]     Pharmacy Information (if different than what is on RX)  Name:    Phone:

## 2020-07-01 NOTE — TELEPHONE ENCOUNTER
Writer called the pt's pharmacy to gather more information. Confirmed that PA renewal is needed, as previous PA  on 20.

## 2020-07-01 NOTE — TELEPHONE ENCOUNTER
[facility] Call Center    Phone Message    May a detailed message be left on voicemail: yes     Reason for Call: Medication Question or concern regarding medication     Name of Medication: Vyvanse   Prescribing Provider: Bret Robledo   Pharmacy:    [id number] - [place], [place] - [address] AT [facility] [address] [place] [address]     What on the order needs clarification? Patient was informed by pharmacy that insurance will not cover her prescription.  She is not sure if this means a Prior Authorization is needed.     Patient states she is out of medication.    Action Taken: Message routed to:  Other: Psychiatry Nurse Pool    Travel Screening: Not Applicable

## 2020-07-01 NOTE — TELEPHONE ENCOUNTER
Central Prior Authorization Team   Phone: [phone]      PA Initiation    Medication: lisdexamfetamine (VYVANSE) 70 MG capsule   Insurance Company: [employer] - Phone [phone] Fax [phone]  Pharmacy Filling the Rx: [facility] #[id number] - [place] - [address] AT [facility] OF [place]  Filling Pharmacy Phone: [phone]  Filling Pharmacy Fax:    Start Date: 7/1/2020

## 2020-07-02 NOTE — TELEPHONE ENCOUNTER
Pt called to check the status of this prior authorization. Okay to lvm.    Message routed to: AFIA Davis

## 2020-07-03 NOTE — TELEPHONE ENCOUNTER
Prior Authorization Approval    Authorization Effective Date: 6/2/2020  Authorization Expiration Date: 7/2/2021  Medication: lisdexamfetamine (VYVANSE) 70 MG capsule- APPROVED  Approved Dose/Quantity:  Reference #:     Insurance Company: [employer] - Phone [phone] Fax [phone]  Expected CoPay:       CoPay Card Available:      Foundation Assistance Needed:    Which Pharmacy is filling the prescription (Not needed for infusion/clinic administered): [id number] - [place], [place] - [address] AT [facility] [place]  Pharmacy Notified: Yes  Patient Notified: No

## 2020-07-13 ENCOUNTER — TELEPHONE (OUTPATIENT)
Dept: PSYCHIATRY | Facility: CLINIC | Age: 30
End: 2020-07-13

## 2020-07-13 NOTE — TELEPHONE ENCOUNTER
On 7/13/2020, an appointment coverage approval for a second opinion at [facility] was received form [employer]. Writer sent the form to scanning on 7/13/2020. JUDY Mesa, EMT

## 2020-07-14 ENCOUNTER — TELEPHONE (OUTPATIENT)
Dept: PSYCHIATRY | Facility: CLINIC | Age: 30
End: 2020-07-14

## 2020-07-14 NOTE — TELEPHONE ENCOUNTER
RE: Eval Report from [facility]   Received: Today   Message Contents   Audrey Bae, Delia Foy RN; Bret Robledo MD; Zen Mesa EMT    Phone Number: [phone]               No   nothing yet    Previous Messages     ----- Message -----   From: Delia Osorio RN   Sent: 7/13/2020   5:36 PM CDT   To: Bret Robledo MD, Audrey Bae CMA, *   Subject: FW: Eval Report from [facility]                         Hi All,     Just wondering if anyone has seen this eval come through via fax?     -Delia   ----- Message -----   From: Lavinia Swanson   Sent: 7/13/2020   3:44 PM CDT   To: Delia Osorio RN   Subject: Eval Report from [facility]                             Lexus, would like to know if Dr. Robledo, received the Eval Report from the [facility]. This is all the info given.     Thanks!           Called pt and LVM notifying her that eval has not been received.

## 2020-07-15 DIAGNOSIS — F33.2 MAJOR DEPRESSIVE DISORDER, RECURRENT, SEVERE WITHOUT PSYCHOTIC FEATURES (H): ICD-10-CM

## 2020-07-15 DIAGNOSIS — F33.1 MODERATE EPISODE OF RECURRENT MAJOR DEPRESSIVE DISORDER (H): ICD-10-CM

## 2020-07-15 DIAGNOSIS — F33.2 SEVERE EPISODE OF RECURRENT MAJOR DEPRESSIVE DISORDER, WITHOUT PSYCHOTIC FEATURES (H): ICD-10-CM

## 2020-07-17 ENCOUNTER — TELEPHONE (OUTPATIENT)
Dept: PSYCHIATRY | Facility: CLINIC | Age: 30
End: 2020-07-17

## 2020-07-17 NOTE — TELEPHONE ENCOUNTER
Medication requested:   ARIPiprazole (ABILIFY) 2 MG tablet     lamoTRIgine (LAMICTAL) 200 MG tablet   FLUOXETINE 40MG CAPSULES   Last refilled:   6/19/20  Qty:   30  60  60     Last seen: 12/26/20  RTC: 3 months  Cancel: 1  No-show: 0  Next appt: 7/23/20     Refill decision:   Refill pended and routed to the provider for review/determination due to   Cancel x 1  Pt outside of RTC timeframe.

## 2020-07-17 NOTE — TELEPHONE ENCOUNTER
Writer received phone call from pt requesting an update on the [facility] office visit notes. Informed her these have not been received, but agreed to reach out to clinic staff to see if they have been faxed to clinic. Pt agreed with this plan.    Later received another phone call from the pt who will have the [facility] team e-mail the notes to this writer. Writer agreed with this plan.

## 2020-07-18 RX ORDER — ARIPIPRAZOLE 2 MG/1
2 TABLET ORAL DAILY
Qty: 30 TABLET | Refills: 0 | Status: SHIPPED | OUTPATIENT
Start: 2020-07-18 | End: 2020-08-14

## 2020-07-18 RX ORDER — LAMOTRIGINE 200 MG/1
400 TABLET ORAL DAILY
Qty: 60 TABLET | Refills: 0 | Status: SHIPPED | OUTPATIENT
Start: 2020-07-18 | End: 2020-08-27

## 2020-07-18 RX ORDER — FLUOXETINE 40 MG/1
80 CAPSULE ORAL DAILY
Qty: 60 CAPSULE | Refills: 0 | Status: SHIPPED | OUTPATIENT
Start: 2020-07-18 | End: 2020-08-27

## 2020-07-20 NOTE — TELEPHONE ENCOUNTER
received e-mail from [place] with an office visit from [place]. Per the sender, the entire eval will need to be send at a later time. Will forward e-mail to fellow staff members to print and scan and place in scanning and Dr. Robledo's folder for review.

## 2020-07-20 NOTE — TELEPHONE ENCOUNTER
Audrey Bae, Delia Foy RN; Bret Robledo MD    Cc: Zen Mesa, EMT    Caller: Unspecified (6 days ago,  9:57 AM)                   Yes it is in his folder, and sent to scanning.

## 2020-07-23 ENCOUNTER — VIRTUAL VISIT (OUTPATIENT)
Dept: PSYCHIATRY | Facility: CLINIC | Age: 30
End: 2020-07-23
Attending: PSYCHIATRY & NEUROLOGY
Payer: COMMERCIAL

## 2020-07-23 ENCOUNTER — TELEPHONE (OUTPATIENT)
Dept: PSYCHIATRY | Facility: CLINIC | Age: 30
End: 2020-07-23

## 2020-07-23 DIAGNOSIS — F90.0 ATTENTION DEFICIT HYPERACTIVITY DISORDER (ADHD), PREDOMINANTLY INATTENTIVE TYPE: ICD-10-CM

## 2020-07-23 DIAGNOSIS — F33.2 MAJOR DEPRESSIVE DISORDER, RECURRENT, SEVERE WITHOUT PSYCHOTIC FEATURES (H): Primary | ICD-10-CM

## 2020-07-23 RX ORDER — VILAZODONE HYDROCHLORIDE 10 MG/1
10 TABLET ORAL DAILY
Qty: 14 TABLET | Refills: 0 | Status: SHIPPED | OUTPATIENT
Start: 2020-07-23 | End: 2020-08-27

## 2020-07-23 RX ORDER — LISDEXAMFETAMINE DIMESYLATE 70 MG/1
140 CAPSULE ORAL 2 TIMES DAILY
Qty: 120 CAPSULE | Refills: 0 | Status: SHIPPED | OUTPATIENT
Start: 2020-07-23 | End: 2021-02-23

## 2020-07-23 RX ORDER — DEXTROAMPHETAMINE SULFATE 10 MG/1
10 TABLET ORAL 3 TIMES DAILY
Qty: 180 TABLET | Refills: 0 | Status: SHIPPED | OUTPATIENT
Start: 2020-07-23 | End: 2020-12-09

## 2020-07-23 RX ORDER — VILAZODONE HYDROCHLORIDE 40 MG/1
40 TABLET ORAL DAILY
Qty: 30 TABLET | Refills: 1 | Status: SHIPPED | OUTPATIENT
Start: 2020-08-06 | End: 2020-09-14

## 2020-07-23 RX ORDER — LISDEXAMFETAMINE DIMESYLATE 70 MG/1
140 CAPSULE ORAL
Qty: 120 CAPSULE | Refills: 0 | Status: SHIPPED | OUTPATIENT
Start: 2020-07-23 | End: 2021-02-23

## 2020-07-23 NOTE — TELEPHONE ENCOUNTER
On 7/23/2020, at 1607, writer called patient at mobile to confirm Virtual Visit. Writer unable to make contact with patient. Writer left detailed voice message for callback. [phone], left as call back number. JUDY Mesa, EMT

## 2020-07-24 ENCOUNTER — TELEPHONE (OUTPATIENT)
Dept: PSYCHIATRY | Facility: CLINIC | Age: 30
End: 2020-07-24

## 2020-07-24 NOTE — LETTER
"2020    RE: Lexus Uribe  [address]  [place] [address]  : 1990     To Whom it May Concern,    This request is in response to the denial of coverage for vilazodone (VIIBRYD) 10 MG TABS tablet; Take 1 tablet (10 mg) by mouth daily for 14 days. We kindly ask that you reconsider the decision to deny coverage of this medication.    Lexus Uribe has been under my care since 2015. Throughout her care at the [facility], she has tried numerous medications at varying doses for multiple diagnoses. Lexus started receiving psychiatric services in 5th grade and has a long mental health history. She has been seen by multiple providers within multiple health systems. Since first receiving treatment, she has had multiple diagnoses, including, Major Depressive Disorder, severe without psychotic features, Anxiety, ADD, Impulse control disorder in reference to Trichotillomania.    She also attended residential treatment in [place] for numerous months. She found the stay helpful, but has again, been struggling with symptoms. The most significant symptoms being chronic sadness, isolation, anhedonia, irritability, lack of motivation, and disorganization. Throughout her mental health treatment, Lexus has tried bupropion IR and XL, venlafaxine IR and ER, duloxetine, lamotrigine, and sertraline. Lexus continues to take bupropion XL, along with fluoxetine and lamotrigine. Unfortunately, she does not find this regimen effective in treating her depression. Venlafaxine was tried prior to her care at the [facility]. Per the pt, it was not effective in treating her symptoms as well.    Please see the attached article for additional information as to why Viibryd was chosen for Lexus and the likelihood it will be successful in treating her ongoing symptoms related to her depression. Per Andrew, Rodrigue, Jorje, Albania, Rudy, and Gomez (2015), \"Vilazodone is an antidepressant with " "novel mechanism of action because its chemical structure is unrelated to conventional antidepressant, and it has a selective serotonin (5-HT) reuptake inhibitor and 5-HT1A receptor partial agonist profile. Because of its unique action mechanism, indirect evidence enabled clinicians to speculate that vilazodone may potentially provide faster treatment onset along with enhanced remission and response rates with lower AE risks than preexisting anti-depressants.\"     The article goes onto share that Viibryd can also treat Generalized Anxiety Disorder. As mentioned above, this patient also struggles with anxiety. Lastly, based on the study included in the article, the authors were able to come to the conclusion that \"vilazodone is an effective and safe treatment option with novel action mechanisms for patients with depression (Rodrigue Morales, Jorje, Alban[place], Rudy, and Gomez 2015).\"    Please take all of the above information into consideration when reviewing the appeal request for this patient's Viibryd 10 mg tablet. If it is not approved, Lexus could be at risk for decompensation and possibly hospitalization.      Sincerely,    Bret Robledo MD  AND  Delia Patel RN                                                                                References    RAMON Morales, BAILEY Husain, RAMON Recinos, CODI Lovelace, ALANIS Grant, & BAILEY Dyer (2015). Vilazodone for the Treatment of     Major Depressive Disorder: Focusing on Its Clinical Studies and Mechanism of Action. Psychiatry      Investigation. 12(2), 155-163, https://www.ncbi.nlm.nih.gov/pmc/articles/GXS0559426/  "

## 2020-07-27 DIAGNOSIS — Z53.9 ERRONEOUS ENCOUNTER--DISREGARD: Primary | ICD-10-CM

## 2020-08-04 ENCOUNTER — TELEPHONE (OUTPATIENT)
Dept: PSYCHIATRY | Facility: CLINIC | Age: 30
End: 2020-08-04

## 2020-08-04 NOTE — TELEPHONE ENCOUNTER
[facility] Call [facility]    Phone Message    May a detailed message be left on voicemail: yes     Reason for Call: Other: Pt called stating  instrcuted her to call about [facility] referrals.      Action Taken: Other: [facility] [place]    Travel Screening: Not Applicable

## 2020-08-08 DIAGNOSIS — F33.2 MAJOR DEPRESSIVE DISORDER, RECURRENT, SEVERE WITHOUT PSYCHOTIC FEATURES (H): ICD-10-CM

## 2020-08-11 ENCOUNTER — TELEPHONE (OUTPATIENT)
Dept: PSYCHIATRY | Facility: CLINIC | Age: 30
End: 2020-08-11

## 2020-08-11 RX ORDER — BUPROPION HYDROCHLORIDE 300 MG/1
300 TABLET ORAL EVERY MORNING
Qty: 60 TABLET | Refills: 0 | Status: SHIPPED | OUTPATIENT
Start: 2020-08-11 | End: 2020-09-24

## 2020-08-11 NOTE — TELEPHONE ENCOUNTER
[facility] Call Center    Phone Message    May a detailed message be left on voicemail: yes     Reason for Call: Other: The pt stated that she was evaluated at [facility], and Dr. Robledo asked her to call Delia to discuss their testing recommendations.     Action Taken: Message routed to:  Other: [facility] Gutierrez    Travel Screening: Negative

## 2020-08-11 NOTE — TELEPHONE ENCOUNTER
Medication requested: buPROPion (WELLBUTRIN XL) 300 MG 24 hr tablet  Take 1 tablet (300 mg) by mouth every morning With 150 mg tab for TDD of 450 mg   Last refilled: 4/1/4/20  Qty: 60/0      Last seen: 12/19/2019  RTC: 3 months  Cancel: 1 6/11/20  No-show: 1 7/23/20  Next appt: None    Refill decision:Refill pended and routed to the provider for review/determination due to       Cancel x1, NSH x 1. Scheduling has been notified to contact the pt for appointment.

## 2020-08-12 NOTE — TELEPHONE ENCOUNTER
Writer called pt back to get additional information. She informed this writer that Patterson ultimately suggested Genesight testing and a MTM appt. Writer agreed to reach out to the provider for approval on this order. Also, Patterson suggested Viibryd with bupropion. Pt said Viibryd was not approved, although writer now sees that medication was likely approved and is approved until Aug 2021 (see 7/24 PA encounter).     Writer agreed to call the pharmacy and confirm that both Viibryd 10 mg and 40 mg can be processed through insurance. Will also reach out to provider for approval of Genesight testing and MTM appt.

## 2020-08-14 ENCOUNTER — TELEPHONE (OUTPATIENT)
Dept: PSYCHIATRY | Facility: CLINIC | Age: 30
End: 2020-08-14

## 2020-08-14 ENCOUNTER — VIRTUAL VISIT (OUTPATIENT)
Dept: PSYCHIATRY | Facility: CLINIC | Age: 30
End: 2020-08-14
Attending: PSYCHIATRY & NEUROLOGY
Payer: COMMERCIAL

## 2020-08-14 DIAGNOSIS — F33.1 MAJOR DEPRESSIVE DISORDER, RECURRENT EPISODE, MODERATE (H): ICD-10-CM

## 2020-08-14 DIAGNOSIS — F33.2 MAJOR DEPRESSIVE DISORDER, RECURRENT, SEVERE WITHOUT PSYCHOTIC FEATURES (H): ICD-10-CM

## 2020-08-14 RX ORDER — ARIPIPRAZOLE 2 MG/1
2 TABLET ORAL DAILY
Qty: 90 TABLET | Refills: 0 | Status: SHIPPED | OUTPATIENT
Start: 2020-08-14 | End: 2020-11-10

## 2020-08-14 RX ORDER — BUPROPION HYDROCHLORIDE 150 MG/1
TABLET ORAL
Qty: 90 TABLET | Refills: 0 | Status: SHIPPED | OUTPATIENT
Start: 2020-08-14 | End: 2020-09-24

## 2020-08-14 NOTE — TELEPHONE ENCOUNTER
Order was placed at [facility] for a Genesant test. The kit will be shipped overnight via FedEx with instructions including a return Fed Ex envelope. A MTM appointment needs to be set upon receiving results.Nataliia Dumont LPN

## 2020-08-14 NOTE — TELEPHONE ENCOUNTER
On 8/14/2020, at 1124, writer called patient at mobile to confirm Virtual Visit. Writer unable to make contact with patient. Writer left detailed voice message for callback. [phone], left as call back number. JUDY Mesa, EMT

## 2020-08-14 NOTE — TELEPHONE ENCOUNTER
Nataliia Dumont LPN Labossiere, Laura, ROLANDA               Hi,     I ordered GeneSight test. A kit will be sent to the patient via FedEx with instructions including a return FedEx envelope. I will order the MTM once I get results.     Thanks     T        Writer called pt and LVM with an update on the Genesight testing.

## 2020-08-14 NOTE — TELEPHONE ENCOUNTER
Provider asked that writer enter orders for Genesight. Writer will connect with Nataliia Dumont LPN who typically handles Genesight. Pt would also like MTM visit.

## 2020-08-23 DIAGNOSIS — F33.1 MODERATE EPISODE OF RECURRENT MAJOR DEPRESSIVE DISORDER (H): ICD-10-CM

## 2020-08-23 DIAGNOSIS — F33.2 MAJOR DEPRESSIVE DISORDER, RECURRENT, SEVERE WITHOUT PSYCHOTIC FEATURES (H): ICD-10-CM

## 2020-08-23 DIAGNOSIS — F33.2 SEVERE EPISODE OF RECURRENT MAJOR DEPRESSIVE DISORDER, WITHOUT PSYCHOTIC FEATURES (H): ICD-10-CM

## 2020-08-24 DIAGNOSIS — F33.2 MAJOR DEPRESSIVE DISORDER, RECURRENT, SEVERE WITHOUT PSYCHOTIC FEATURES (H): ICD-10-CM

## 2020-08-24 RX ORDER — VILAZODONE HYDROCHLORIDE 10 MG/1
10 TABLET ORAL DAILY
Qty: 14 TABLET | Refills: 0 | Status: CANCELLED | OUTPATIENT
Start: 2020-08-24

## 2020-08-27 RX ORDER — LAMOTRIGINE 200 MG/1
400 TABLET ORAL DAILY
Qty: 60 TABLET | Refills: 0 | Status: SHIPPED | OUTPATIENT
Start: 2020-08-27 | End: 2020-10-01

## 2020-08-27 RX ORDER — VILAZODONE HYDROCHLORIDE 10 MG/1
10 TABLET ORAL DAILY
Qty: 30 TABLET | Refills: 0 | Status: SHIPPED | OUTPATIENT
Start: 2020-08-27 | End: 2020-10-01

## 2020-08-27 RX ORDER — FLUOXETINE 40 MG/1
80 CAPSULE ORAL DAILY
Qty: 60 CAPSULE | Refills: 0 | Status: SHIPPED | OUTPATIENT
Start: 2020-08-27 | End: 2020-10-12

## 2020-08-27 NOTE — TELEPHONE ENCOUNTER
Medication requested:      lamoTRIgine (LAMICTAL) 200 MG tablet   FLUOXETINE 40MG CAPSULES  vilazodone (VIIBRYD) 10 MG TABS tablet    Last refilled:   7/18/20 7/18/20 8/12/20  Qty:   60  60  14     Last seen: 12/26/20  RTC: 3 months  Cancel: 1  No-show: 2  Next appt:0     Refill decision:   Refill pended and routed to the provider for review/determination due to   CANCEL X 1  NO SHOW X 2  Pt outside of RTC timeframe.  Scheduling has been notified to contact the pt for appointment.

## 2020-09-10 ENCOUNTER — TELEPHONE (OUTPATIENT)
Dept: PSYCHIATRY | Facility: CLINIC | Age: 30
End: 2020-09-10

## 2020-09-10 NOTE — TELEPHONE ENCOUNTER
Walter Lin    I tried calling Lexus, no answer. Left DVM to verify home/mailing address. Left my direct line as callback. I'll keep you updated as I know more.    Nataliia

## 2020-09-10 NOTE — TELEPHONE ENCOUNTER
Writer called pt. No answer. LVM informing her that writer will reach out to clinic staff to have another [employer] testing set sent.

## 2020-09-10 NOTE — TELEPHONE ENCOUNTER
[facility] Call [facility]    Phone Message    May a detailed message be left on voicemail: yes     Reason for Call: Other: Call back request. Patient wanted to speak with Delia about some genetics testing that was supposed to be sent out via [employer].     Action Taken: Message routed to:  Other: P [facility]MP PSYCH [facility]    Travel Screening: Not Applicable

## 2020-09-10 NOTE — TELEPHONE ENCOUNTER
Walter Lin,    I just spoke with a rep from [employer] and he said FedEx shows it being delivered on 8/19/2020 at 1013 to the [facility] address in [place], [place]

## 2020-09-14 ENCOUNTER — TELEPHONE (OUTPATIENT)
Dept: PSYCHIATRY | Facility: CLINIC | Age: 30
End: 2020-09-14

## 2020-09-14 DIAGNOSIS — F33.2 MAJOR DEPRESSIVE DISORDER, RECURRENT, SEVERE WITHOUT PSYCHOTIC FEATURES (H): ICD-10-CM

## 2020-09-14 RX ORDER — VILAZODONE HYDROCHLORIDE 40 MG/1
40 TABLET ORAL DAILY
Qty: 30 TABLET | Refills: 1 | Status: SHIPPED | OUTPATIENT
Start: 2020-09-14 | End: 2020-10-12

## 2020-09-14 NOTE — TELEPHONE ENCOUNTER
On 9/14/2020, writer received incoming call from patient to update address for [employer] Test Kit. Pt verified [address] [place] [address] as the correct address. Writer called [employer] to ship another test kit to above address, they agreed.Nataliia Dumont LPN

## 2020-09-14 NOTE — TELEPHONE ENCOUNTER
Last Seen 12/16/19    RTC Unknown    Cancel 1  No-Show 2    Next Appt 10/22/20 1500    Incoming Refill From [employer] via Fax    Medication Requested Viibryd 40 mg Tablets    Directions Take 1 Tablet by mouth daily    Qty 30    Last Refill 08/27/20 Qty 30 with no refills       Medication Refill Approved Per Refill Protocol

## 2020-09-15 NOTE — TELEPHONE ENCOUNTER
Nataliia Dumont, Delia Corbett, RN    Caller: Unspecified (5 days ago,  8:22 AM)               Walter Lin,     I received a call back from Lexus yesterday. She verified her address as the one we have on file. I told her I would have [employer] FedEx another kit to her. She agreed to complete and FedEx back to [employer]. Spoke to rep at [employer] who agreed to ship her another kit.     Thanks,     Nataliia

## 2020-09-24 ENCOUNTER — TELEPHONE (OUTPATIENT)
Dept: PSYCHIATRY | Facility: CLINIC | Age: 30
End: 2020-09-24

## 2020-09-24 DIAGNOSIS — F33.2 MAJOR DEPRESSIVE DISORDER, RECURRENT, SEVERE WITHOUT PSYCHOTIC FEATURES (H): ICD-10-CM

## 2020-09-24 DIAGNOSIS — F33.1 MAJOR DEPRESSIVE DISORDER, RECURRENT EPISODE, MODERATE (H): ICD-10-CM

## 2020-09-24 RX ORDER — BUPROPION HYDROCHLORIDE 150 MG/1
TABLET ORAL
Qty: 30 TABLET | Refills: 2 | Status: SHIPPED | OUTPATIENT
Start: 2020-09-24 | End: 2020-11-30

## 2020-09-24 RX ORDER — BUPROPION HYDROCHLORIDE 300 MG/1
300 TABLET ORAL EVERY MORNING
Qty: 30 TABLET | Refills: 2 | Status: SHIPPED | OUTPATIENT
Start: 2020-09-24 | End: 2021-01-20

## 2020-09-30 DIAGNOSIS — F33.2 SEVERE EPISODE OF RECURRENT MAJOR DEPRESSIVE DISORDER, WITHOUT PSYCHOTIC FEATURES (H): ICD-10-CM

## 2020-09-30 DIAGNOSIS — F33.2 MAJOR DEPRESSIVE DISORDER, RECURRENT, SEVERE WITHOUT PSYCHOTIC FEATURES (H): ICD-10-CM

## 2020-10-01 RX ORDER — LAMOTRIGINE 200 MG/1
400 TABLET ORAL DAILY
Qty: 60 TABLET | Refills: 0 | Status: SHIPPED | OUTPATIENT
Start: 2020-10-01 | End: 2020-10-12

## 2020-10-01 RX ORDER — VILAZODONE HYDROCHLORIDE 10 MG/1
10 TABLET ORAL DAILY
Qty: 30 TABLET | Refills: 0 | Status: SHIPPED | OUTPATIENT
Start: 2020-10-01 | End: 2020-10-22

## 2020-10-01 NOTE — TELEPHONE ENCOUNTER
lamoTRIgine 200 MG    Last refilled: 8/27/20  Qty: 60  vilazodone 10 MG    Last refilled: 8/27/20  Qty: 30    Last seen:12/26/19  RTC: unk  Cancel: 6/11  No-show: 7/23 8/14  Next appt: 10/22/20  Refill pended and routed to the provider for review/determination due to CANCEL  6/11  NOSHOW 7/23 8/14

## 2020-10-12 ENCOUNTER — VIRTUAL VISIT (OUTPATIENT)
Dept: PSYCHIATRY | Facility: CLINIC | Age: 30
End: 2020-10-12
Attending: PSYCHIATRY & NEUROLOGY
Payer: COMMERCIAL

## 2020-10-12 DIAGNOSIS — F33.1 MODERATE EPISODE OF RECURRENT MAJOR DEPRESSIVE DISORDER (H): ICD-10-CM

## 2020-10-12 DIAGNOSIS — F33.2 MAJOR DEPRESSIVE DISORDER, RECURRENT, SEVERE WITHOUT PSYCHOTIC FEATURES (H): ICD-10-CM

## 2020-10-12 DIAGNOSIS — F33.2 SEVERE EPISODE OF RECURRENT MAJOR DEPRESSIVE DISORDER, WITHOUT PSYCHOTIC FEATURES (H): ICD-10-CM

## 2020-10-12 DIAGNOSIS — F90.0 ATTENTION DEFICIT HYPERACTIVITY DISORDER (ADHD), PREDOMINANTLY INATTENTIVE TYPE: ICD-10-CM

## 2020-10-12 RX ORDER — DEXTROAMPHETAMINE SACCHARATE, AMPHETAMINE ASPARTATE, DEXTROAMPHETAMINE SULFATE AND AMPHETAMINE SULFATE 2.5; 2.5; 2.5; 2.5 MG/1; MG/1; MG/1; MG/1
10 TABLET ORAL 3 TIMES DAILY
Qty: 90 TABLET | Refills: 0 | Status: SHIPPED | OUTPATIENT
Start: 2020-10-12 | End: 2020-12-28

## 2020-10-12 RX ORDER — FLUOXETINE 40 MG/1
80 CAPSULE ORAL DAILY
Qty: 60 CAPSULE | Refills: 0 | Status: SHIPPED | OUTPATIENT
Start: 2020-10-12 | End: 2020-10-22

## 2020-10-12 RX ORDER — LISDEXAMFETAMINE DIMESYLATE 70 MG/1
140 CAPSULE ORAL 2 TIMES DAILY
Qty: 120 CAPSULE | Refills: 0 | Status: SHIPPED | OUTPATIENT
Start: 2020-11-12 | End: 2020-12-09

## 2020-10-12 RX ORDER — LISDEXAMFETAMINE DIMESYLATE 70 MG/1
140 CAPSULE ORAL 2 TIMES DAILY
Qty: 120 CAPSULE | Refills: 0 | Status: SHIPPED | OUTPATIENT
Start: 2020-10-12 | End: 2020-11-11

## 2020-10-12 RX ORDER — VILAZODONE HYDROCHLORIDE 40 MG/1
40 TABLET ORAL DAILY
Qty: 30 TABLET | Refills: 1 | Status: SHIPPED | OUTPATIENT
Start: 2020-10-12 | End: 2020-12-23

## 2020-10-12 RX ORDER — LISDEXAMFETAMINE DIMESYLATE 70 MG/1
140 CAPSULE ORAL 2 TIMES DAILY
Qty: 120 CAPSULE | Refills: 0 | Status: SHIPPED | OUTPATIENT
Start: 2020-12-13 | End: 2021-01-12

## 2020-10-12 RX ORDER — LAMOTRIGINE 200 MG/1
400 TABLET ORAL DAILY
Qty: 60 TABLET | Refills: 0 | Status: SHIPPED | OUTPATIENT
Start: 2020-10-12 | End: 2020-12-07

## 2020-10-19 DIAGNOSIS — F33.2 MAJOR DEPRESSIVE DISORDER, RECURRENT, SEVERE WITHOUT PSYCHOTIC FEATURES (H): ICD-10-CM

## 2020-10-20 DIAGNOSIS — F33.2 MAJOR DEPRESSIVE DISORDER, RECURRENT, SEVERE WITHOUT PSYCHOTIC FEATURES (H): ICD-10-CM

## 2020-10-21 ENCOUNTER — TRANSFERRED RECORDS (OUTPATIENT)
Dept: HEALTH INFORMATION MANAGEMENT | Facility: CLINIC | Age: 30
End: 2020-10-21

## 2020-10-21 RX ORDER — BUPROPION HYDROCHLORIDE 300 MG/1
TABLET ORAL
Qty: 30 TABLET | Refills: 2 | OUTPATIENT
Start: 2020-10-21

## 2020-10-21 RX ORDER — VILAZODONE HYDROCHLORIDE 10 MG/1
10 TABLET ORAL DAILY
Qty: 30 TABLET | Refills: 0 | OUTPATIENT
Start: 2020-10-21

## 2020-10-22 ENCOUNTER — VIRTUAL VISIT (OUTPATIENT)
Dept: PSYCHIATRY | Facility: CLINIC | Age: 30
End: 2020-10-22
Attending: PSYCHIATRY & NEUROLOGY
Payer: COMMERCIAL

## 2020-10-22 ENCOUNTER — TELEPHONE (OUTPATIENT)
Dept: PSYCHIATRY | Facility: CLINIC | Age: 30
End: 2020-10-22

## 2020-10-22 NOTE — TELEPHONE ENCOUNTER
On 10/22/2020, at 1432, writer called patient at [phone] to confirm Virtual Visit. Writer unable to make contact with patient. Writer left detailed voice message for call back. [phone] left as call back number. Jaimee Jaramillo MA

## 2020-10-22 NOTE — TELEPHONE ENCOUNTER
12 pages of Gene Sight test results were received. The original copies were sent to scanning. Copies were mailed to patient via USPS.Nataliia Dumont LPN

## 2020-10-27 NOTE — TELEPHONE ENCOUNTER
Received return phone call from Michelle. She confirmed that PA for qty limit exception needs to be completed. She said this is a basic PA that can be completed with [employer]. Per Michelle, all of patient's other medications are going through insurance for five month supply to prepare for her trip. Michelle is only waiting on the Vyvanse and requested a phone call once writer has officially started the PA. Agreed to call Michelle back at the number in the previous message. Reminder set to f/up on this.    Other (Free Text): Shave removal discussed. \\nPt declines today. Detail Level: Simple Note Text (......Xxx Chief Complaint.): This diagnosis correlates with the Other (Free Text): Viviscal vitamins recommended. \\nBiotin vitamins recommended. \\nViviscal shampoo & conditioner recommended.

## 2020-10-30 NOTE — TELEPHONE ENCOUNTER
What Type Of Note Output Would You Prefer (Optional)?: Standard Output Writer received signed letter from the provider. Faxed to the number in the earlier message.    Hpi Title: Evaluation of Skin Lesions How Severe Are Your Spot(S)?: mild Have Your Spot(S) Been Treated In The Past?: has not been treated Family Member: Mother Additional History: Spot on tip of nose, mole on mid upper back. Last 4 lo has had issues with area on tip of nose or ears getting swollen then crusting and peeling. 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 Not sure if he has ever had frostbite in these areas. Has used vitamin e and Neosporin on areas, not sure if help.

## 2020-11-10 DIAGNOSIS — F33.2 MAJOR DEPRESSIVE DISORDER, RECURRENT, SEVERE WITHOUT PSYCHOTIC FEATURES (H): ICD-10-CM

## 2020-11-10 RX ORDER — ARIPIPRAZOLE 2 MG/1
2 TABLET ORAL DAILY
Qty: 90 TABLET | Refills: 0 | Status: SHIPPED | OUTPATIENT
Start: 2020-11-10 | End: 2021-03-04

## 2020-11-10 NOTE — TELEPHONE ENCOUNTER
Last Seen 12/26/19    RTC 3 months    Cancel 1  No-Show 3    Next Appt 01/21/21 at 1630    Incoming Refill From [employer] via Fax    Medication Requested Aripiprazole 2 mg Tablets     Directions Take 1 Tablet by mouth daily    Qty 90    Last Refill 08/14/20 Qty 90 with no refills       Medication Refill Pended Per Refill Protocol

## 2020-11-22 ENCOUNTER — HEALTH MAINTENANCE LETTER (OUTPATIENT)
Age: 30
End: 2020-11-22

## 2020-11-26 DIAGNOSIS — F33.1 MAJOR DEPRESSIVE DISORDER, RECURRENT EPISODE, MODERATE (H): ICD-10-CM

## 2020-11-30 RX ORDER — BUPROPION HYDROCHLORIDE 150 MG/1
450 TABLET ORAL EVERY MORNING
Qty: 90 TABLET | Refills: 1 | Status: SHIPPED | OUTPATIENT
Start: 2020-11-30 | End: 2021-02-04

## 2020-11-30 NOTE — TELEPHONE ENCOUNTER
Requested: BUPROPION XL 150MG TABLETS (24 H)  Last refilled: 9/24/20  Qty: 30      Last seen: 12/26/19  RTC: 3 months  Cancel: 1  No-show: 3  Next appt: 1/21/21    Refill decision:   Refill pended and routed to the provider for review/determination due to no show x 3  Cancel x 1  Pt outside of RTC timeframe.

## 2020-12-03 ENCOUNTER — TELEPHONE (OUTPATIENT)
Dept: PSYCHIATRY | Facility: CLINIC | Age: 30
End: 2020-12-03

## 2020-12-03 NOTE — TELEPHONE ENCOUNTER
Health Call Center    Phone Message    May a detailed message be left on voicemail: yes     Reason for Call: Medication Question or concern regarding medication   Prescription Clarification  Name of Medication: vyvanse and dexedrine (?)  Prescribing Provider: Bret Robledo MD   Pharmacy:  [address] [place] [address]   What on the order needs clarification? Patient is requesting these medications to be moved to the pharmacy listed above.          Action Taken: Message routed to:  Other: Nurse pool    Travel Screening: Not Applicable

## 2020-12-04 DIAGNOSIS — F33.2 SEVERE EPISODE OF RECURRENT MAJOR DEPRESSIVE DISORDER, WITHOUT PSYCHOTIC FEATURES (H): ICD-10-CM

## 2020-12-07 DIAGNOSIS — F90.0 ATTENTION DEFICIT HYPERACTIVITY DISORDER (ADHD), PREDOMINANTLY INATTENTIVE TYPE: ICD-10-CM

## 2020-12-07 RX ORDER — LAMOTRIGINE 200 MG/1
400 TABLET ORAL DAILY
Qty: 60 TABLET | Refills: 1 | Status: SHIPPED | OUTPATIENT
Start: 2020-12-07 | End: 2021-02-10

## 2020-12-07 NOTE — TELEPHONE ENCOUNTER
Medication requested: lamoTRIgine (LAMICTAL) 200 MG tablet   Last refilled: 10/12/20  Qty: 60/0      Last seen: 12/26/2019  RTC: 3 months  Cancel: 1  No-show: 3  Next appt: 1/21/2021    Refill decision:   Refill pended and routed to the provider for review/determination due to     outside of timeframe, cx x 1, NSH x 3> appt 1/21/21

## 2020-12-07 NOTE — TELEPHONE ENCOUNTER
[facility] Call [facility]    Phone Message    May a detailed message be left on voicemail: yes     Reason for Call: Medication Refill Request    Has the patient contacted the pharmacy for the refill? Yes   Name of medication being requested: Vyvanse and dextroamphetamine (DEXTROSTAT) 10 MG tablet  Provider who prescribed the medication: Venkat  Pharmacy:      [facility] #[id number] - [place], [place] - [address] AT [facility] OF [address]    Date medication is needed: Today    Pt stated that she called in for this last week and never heard back. Would like a call at number provided to touch base regarding medication.       Action Taken: Other: Sent to Kannan ORANTES    Travel Screening: Not Applicable

## 2020-12-07 NOTE — TELEPHONE ENCOUNTER
Last seen: 12/26/2019  RTC: 3 months  Cancel: 6/11/20  No-show: 7/23, 8/14, 10/22  Next appt: 1/21/21     Incoming refill from patient via telephone     Medication requested:       lisdexamfetamine (VYVANSE) 70 MG capsule 120 capsule 0 12/13/2020 1/12/2021   Sig:   Take 2 capsules (140 mg) by mouth 2 times daily     Last refilled: 11/16, 10/14, 9/8 all at [facility] per [place]      Disp Refills Start End DAVID   dextroamphetamine (DEXTROSTAT) 10 MG tablet 180 tablet 0 7/23/2020  No   Sig - Route: Take 1 tablet (10 mg) by mouth 3 times daily   Last refilled: 7/23 ([facility]), 1/27 ([place] [id number]), 1/6 ([facility] [address]) per [place]      Pended Vyvanse orders with start dates 12/7/20, 1/4/21 and Desxtrostat, routed to provider due to refill protocol (cancellation, no-show, outside RTC window).    Writer called pt and provided status update on refills.  Pt identified being unable to work with staff at [facility] location, so she's unable to get the Vyvanse refill on profile there.

## 2020-12-09 RX ORDER — LISDEXAMFETAMINE DIMESYLATE 70 MG/1
140 CAPSULE ORAL 2 TIMES DAILY
Qty: 120 CAPSULE | Refills: 0 | Status: SHIPPED | OUTPATIENT
Start: 2021-01-04 | End: 2021-02-23

## 2020-12-09 RX ORDER — DEXTROAMPHETAMINE SULFATE 10 MG/1
10 TABLET ORAL 3 TIMES DAILY
Qty: 180 TABLET | Refills: 0 | Status: SHIPPED | OUTPATIENT
Start: 2020-12-09 | End: 2021-01-20

## 2020-12-09 RX ORDER — LISDEXAMFETAMINE DIMESYLATE 70 MG/1
140 CAPSULE ORAL 2 TIMES DAILY
Qty: 120 CAPSULE | Refills: 0 | Status: SHIPPED | OUTPATIENT
Start: 2020-12-09 | End: 2021-02-23

## 2020-12-11 NOTE — TELEPHONE ENCOUNTER
12/09/20 0958 Sign Bret Robledo MD   E-Prescribing Status: Receipt confirmed by pharmacy (12/9/2020  9:59 AM CST)

## 2020-12-21 ENCOUNTER — TELEPHONE (OUTPATIENT)
Dept: PSYCHIATRY | Facility: CLINIC | Age: 30
End: 2020-12-21

## 2020-12-21 NOTE — TELEPHONE ENCOUNTER
-Writer received incoming phone call from the patient requesting a letter to extend her current semester of school at [employer]. Pt reports her mental health symptoms are making it difficult to complete her assignments on time. Pt asked that this letter is sent to her personal e-mail ([email or web address]) and to her school at [email or web address]. Pt is requesting this is sent to her school by 4 pm today. Informed her this may be difficult, but agreed to start letter ASAP. Pt voiced understanding.    -Letter drafted and message sent to provider for approval.

## 2020-12-21 NOTE — LETTER
December 21, 2020      TO: Lexus Uribe  [address] [facility] [address]  [place] [address]         To Whom it May Concern,    Lexus Uribe is under my care at the [facility].  She is currently struggling with an exacerbation of her mental health symptoms. Unfortunately, this has directly impacted her academic performance. I'm asking that you extend the current semester, as this will allow Lexus to complete her assignments while dealing with her current symptoms.     If you have any additional questions regarding this letter, please call the clinic at the number listed above.        Sincerely,     Bret Robledo MD

## 2020-12-21 NOTE — TELEPHONE ENCOUNTER
Bret Robledo MD Labossiere, Laura, RN   Caller: Unspecified (Today,  1:52 PM)             I would much appreciate you writing the letter.

## 2020-12-22 ENCOUNTER — TELEPHONE (OUTPATIENT)
Dept: PSYCHIATRY | Facility: CLINIC | Age: 30
End: 2020-12-22

## 2020-12-23 DIAGNOSIS — F33.2 MAJOR DEPRESSIVE DISORDER, RECURRENT, SEVERE WITHOUT PSYCHOTIC FEATURES (H): ICD-10-CM

## 2020-12-23 RX ORDER — VILAZODONE HYDROCHLORIDE 40 MG/1
40 TABLET ORAL DAILY
Qty: 90 TABLET | Refills: 0 | Status: SHIPPED | OUTPATIENT
Start: 2020-12-23 | End: 2021-03-04

## 2020-12-28 ENCOUNTER — VIRTUAL VISIT (OUTPATIENT)
Dept: PSYCHIATRY | Facility: CLINIC | Age: 30
End: 2020-12-28
Attending: PSYCHIATRY & NEUROLOGY
Payer: COMMERCIAL

## 2021-01-13 DIAGNOSIS — F33.2 SEVERE EPISODE OF RECURRENT MAJOR DEPRESSIVE DISORDER, WITHOUT PSYCHOTIC FEATURES (H): ICD-10-CM

## 2021-01-14 RX ORDER — LAMOTRIGINE 200 MG/1
400 TABLET ORAL DAILY
Qty: 60 TABLET | Refills: 1 | OUTPATIENT
Start: 2021-01-14

## 2021-01-14 NOTE — TELEPHONE ENCOUNTER
MATT 12/7/20 60: 1 @ [id number]   MUST RETURN FOR CLINIC APPT FOR REFILLS

## 2021-01-19 DIAGNOSIS — F90.0 ATTENTION DEFICIT HYPERACTIVITY DISORDER (ADHD), PREDOMINANTLY INATTENTIVE TYPE: ICD-10-CM

## 2021-01-19 DIAGNOSIS — F33.2 MAJOR DEPRESSIVE DISORDER, RECURRENT, SEVERE WITHOUT PSYCHOTIC FEATURES (H): ICD-10-CM

## 2021-01-19 NOTE — TELEPHONE ENCOUNTER
[facility] Call [facility]    Phone Message    May a detailed message be left on voicemail: yes     Reason for Call: Medication Refill Request    Has the patient contacted the pharmacy for the refill? Yes   Name of medication being requested: Wellbutrin 300 MG and Dextrostat  Provider who prescribed the medication: Antioneerg  Pharmacy:      [facility] #[id number] - [place], [place] - [address] AT [facility] OF  & [facility] CENTER    Date medication is needed: 4 days      Action Taken: Other: Sent to Delia ORANTES    Travel Screening: Not Applicable

## 2021-01-20 ENCOUNTER — TELEPHONE (OUTPATIENT)
Dept: PSYCHIATRY | Facility: CLINIC | Age: 31
End: 2021-01-20

## 2021-01-20 RX ORDER — BUPROPION HYDROCHLORIDE 300 MG/1
TABLET ORAL
Qty: 120 TABLET | OUTPATIENT
Start: 2021-01-20

## 2021-01-20 RX ORDER — BUPROPION HYDROCHLORIDE 300 MG/1
300 TABLET ORAL EVERY MORNING
Qty: 30 TABLET | Refills: 1 | Status: SHIPPED | OUTPATIENT
Start: 2021-01-20 | End: 2021-03-04

## 2021-01-20 RX ORDER — DEXTROAMPHETAMINE SULFATE 10 MG/1
10 TABLET ORAL 3 TIMES DAILY
Qty: 180 TABLET | Refills: 0 | Status: SHIPPED | OUTPATIENT
Start: 2021-01-20 | End: 2021-02-23

## 2021-01-20 NOTE — TELEPHONE ENCOUNTER
On 1/19/2021 a fax came from [employer] requesting a refill for the Dextroamphetamine 10mg tabs to be sent to [facility] #[id number] - [place], [place] - [address] AT [facility] OF [place] [address] & [facility].  Faina Bae, CMA

## 2021-01-20 NOTE — TELEPHONE ENCOUNTER
Last seen: 7/23/20?  RTC: uncertain  Cancel: none  No-show: 7/23/20?, 8/14/20, and 10/22/20  Next appt: 1/21/21     Medication requested: buPROPion (WELLBUTRIN XL) 300 MG 24 hr tablet  Directions:Take 1 tablet (300 mg) by mouth every morning With 150 mg tab for TDD of 450 mg  Qty: 30  Last refilled: approximately 11/24/20 per med tab     Medication requested: dextroamphetamine (DEXTROSTAT) 10 MG tablet  Directions: Take 1 tablet (10 mg) by mouth 3 times daily  Qty: 180  Last refilled: 12/17/20, 10/14/20, and 7/29/20 per [place]      Requests routed to provider for approval due to multiple no shows

## 2021-01-25 DIAGNOSIS — F33.2 MAJOR DEPRESSIVE DISORDER, RECURRENT, SEVERE WITHOUT PSYCHOTIC FEATURES (H): ICD-10-CM

## 2021-01-28 RX ORDER — ARIPIPRAZOLE 2 MG/1
TABLET ORAL
Qty: 90 TABLET | Refills: 0 | OUTPATIENT
Start: 2021-01-28

## 2021-02-02 DIAGNOSIS — F33.1 MAJOR DEPRESSIVE DISORDER, RECURRENT EPISODE, MODERATE (H): ICD-10-CM

## 2021-02-03 NOTE — TELEPHONE ENCOUNTER
buPROPion (WELLBUTRIN XL) 150 MG  Last refilled: 11/30/20  Qty: 90 : 1    Last seen:12/19/19  RTC: 3 MOS  Cancel: 0  No-show:  X 4   Next appt: NONE  Scheduling has been notified to contact the pt for appointment.  Refill pended and routed to the provider for review/determination due to   Pt outside of RTC timeframe ( March 2020 )  WITH  NO SHOW X4

## 2021-02-04 RX ORDER — BUPROPION HYDROCHLORIDE 150 MG/1
450 TABLET ORAL EVERY MORNING
Qty: 90 TABLET | Refills: 2 | Status: SHIPPED | OUTPATIENT
Start: 2021-02-04 | End: 2021-04-20

## 2021-02-23 DIAGNOSIS — F90.0 ATTENTION DEFICIT HYPERACTIVITY DISORDER (ADHD), PREDOMINANTLY INATTENTIVE TYPE: ICD-10-CM

## 2021-02-23 NOTE — TELEPHONE ENCOUNTER
[facility] Call [facility]    Phone Message    May a detailed message be left on voicemail: yes     Reason for Call: Medication Refill Request    Has the patient contacted the pharmacy for the refill? Yes   Name of medication being requested: vyvanse, dexadrine  Provider who prescribed the medication: Venkat  Pharmacy:      [id number] [place] - [address] AT [facility] OF [facility] [address] CENTER    Date medication is needed: asap, pt out of these medications    Action Taken: Message routed to:  Other: nursing pool    Travel Screening: Not Applicable

## 2021-02-23 NOTE — TELEPHONE ENCOUNTER
Last seen: 7/23/20 (unsigned)  RTC: uncertain  Cancel: none  No-show: 7/23/20?, 8/14/20, 10/22/20, and 1/21/21   Next appt: 3/4/21     Medication requested: dextroamphetamine (DEXTROSTAT) 10 MG tablet  Directions: Take 1 tablet (10 mg) by mouth 3 times daily  Qty: 180  Last refilled: 1/22/21, 12/17/20, and 10/14/20 per [place]      Medication requested: lisdexamfetamine (VYVANSE) 70 MG capsule  Directions: Take 2 capsules (140 mg) by mouth 2 times daily  Qty: 120  Last refilled: 1/22/21 (30 d/s), 1/19/21 (3 d/s), and 12/17/20 (30 d/s) per [place]      Requests routed to provider for approval

## 2021-02-24 RX ORDER — DEXTROAMPHETAMINE SULFATE 10 MG/1
10 TABLET ORAL 3 TIMES DAILY
Qty: 90 TABLET | Refills: 0 | Status: SHIPPED | OUTPATIENT
Start: 2021-02-24 | End: 2021-03-04

## 2021-02-24 RX ORDER — LISDEXAMFETAMINE DIMESYLATE 70 MG/1
140 CAPSULE ORAL 2 TIMES DAILY
Qty: 120 CAPSULE | Refills: 0 | Status: SHIPPED | OUTPATIENT
Start: 2021-02-24 | End: 2021-03-04

## 2021-03-04 ENCOUNTER — VIRTUAL VISIT (OUTPATIENT)
Dept: PSYCHIATRY | Facility: CLINIC | Age: 31
End: 2021-03-04
Attending: PSYCHIATRY & NEUROLOGY
Payer: COMMERCIAL

## 2021-03-04 ENCOUNTER — TELEPHONE (OUTPATIENT)
Dept: PSYCHIATRY | Facility: CLINIC | Age: 31
End: 2021-03-04

## 2021-03-04 DIAGNOSIS — F33.2 MAJOR DEPRESSIVE DISORDER, RECURRENT, SEVERE WITHOUT PSYCHOTIC FEATURES (H): ICD-10-CM

## 2021-03-04 DIAGNOSIS — F90.0 ATTENTION DEFICIT HYPERACTIVITY DISORDER (ADHD), PREDOMINANTLY INATTENTIVE TYPE: ICD-10-CM

## 2021-03-04 PROCEDURE — 99213 OFFICE O/P EST LOW 20 MIN: CPT | Mod: GT | Performed by: PSYCHIATRY & NEUROLOGY

## 2021-03-04 RX ORDER — BUPROPION HYDROCHLORIDE 300 MG/1
300 TABLET ORAL EVERY MORNING
Qty: 30 TABLET | Refills: 1 | Status: SHIPPED | OUTPATIENT
Start: 2021-03-04 | End: 2021-06-23

## 2021-03-04 RX ORDER — LISDEXAMFETAMINE DIMESYLATE 70 MG/1
140 CAPSULE ORAL 2 TIMES DAILY
Qty: 120 CAPSULE | Refills: 0 | Status: SHIPPED | OUTPATIENT
Start: 2021-03-04 | End: 2021-04-25

## 2021-03-04 RX ORDER — DEXTROAMPHETAMINE SULFATE 10 MG/1
10 TABLET ORAL 3 TIMES DAILY
Qty: 90 TABLET | Refills: 0 | Status: SHIPPED | OUTPATIENT
Start: 2021-03-04 | End: 2021-04-25

## 2021-03-04 RX ORDER — ARIPIPRAZOLE 2 MG/1
2 TABLET ORAL DAILY
Qty: 90 TABLET | Refills: 0 | Status: SHIPPED | OUTPATIENT
Start: 2021-03-04 | End: 2021-06-09

## 2021-03-04 RX ORDER — VILAZODONE HYDROCHLORIDE 40 MG/1
40 TABLET ORAL DAILY
Qty: 90 TABLET | Refills: 0 | Status: SHIPPED | OUTPATIENT
Start: 2021-03-04 | End: 2021-07-01

## 2021-03-04 ASSESSMENT — PAIN SCALES - GENERAL: PAINLEVEL: NO PAIN (0)

## 2021-03-04 NOTE — PROGRESS NOTES
"VIDEO VISIT  Lexus Uribe is a 30 year old patient who is being evaluated via a billable video visit.      The patient has been notified of following:   \"This video visit will be conducted via a call between you and your physician/provider. We have found that certain health care needs can be provided without the need for an in-person physical exam. This service lets us provide the care you need with a video conversation. If a prescription is necessary we can send it directly to your pharmacy. If lab work is needed we can place an order for that and you can then stop by our lab to have the test done at a later time. Insurers are generally covering virtual visits as they would in-office visits so billing should not be different than normal.  If for some reason you do get billed incorrectly, you should contact the billing office to correct it and that number is in the AVS .    Video Conference to be completed via:  [email or web address]    Patient has given verbal consent for video visit?:  Yes    Patient would prefer that any video invitations be sent by: Send to e-mail at: [email or web address]      How would patient like to obtain AVS?:  i-Nalysis    AVS SmartPhrase [PsychAVS] has been placed in 'Patient Instructions':  Yes     Lexus is seen today for treatment of her recurrent major depressive disorder with a history of alcohol use disorder.  She says that she has been working on her senior paper for 2 years now and has a lot of mental block against completing it.  She sleeps a lot she will be moving out of her parents house.  Weight stable since August she had rapidly gained 40 pounds prior to that.  Anxiety is okay she has excessive sleep and concentration is adequate she has chronic passive suicidal thoughts she has no panic attacks no hair pulling no psychosis no recent alcohol or drug use.    Medications Lexus will be trying an injectable weight loss medicine called Saxenda.  Her psychiatric medications include and asked to " L-cysteine 600 mg twice daily Abilify 2 mg Wellbutrin  mg daily Viibryd 40 mg daily dextroamphetamine 10 mg 3 times daily and Vyvanse 140 mg 2 times daily.  These are grossly excessive dosages which she was on when I inherited inherited her care attempts at reduction have worsened her concentration     Assessment: Diagnoses include major depressive disorder recurrent attention deficit disorder history of alcohol abuse trichotillomania medical diagnoses include hypertension, GERD migraine headaches,    Plan at this point in time she is pretty much maxed out on her current medications she needs to work with her therapist on a behavioral plan as far as completing a the work for school no think it is a motivational issue from her depression.    Start time 3:30 PM, end time 3:51 PM

## 2021-03-04 NOTE — PATIENT INSTRUCTIONS
**For crisis resources, please see the information at the end of this document**     Patient Education      Thank you for coming to the [facility] [place] [facility].    Lab Testing:  If you had lab testing today and your results are reassuring or normal they will be mailed to you or sent through [employer] within 7 days. If the lab tests need quick action we will call you with the results. The phone number we will call with results is # [phone] (home) . If this is not the best number please call our clinic and change the number.    Medication Refills:  If you need any refills please call your pharmacy and they will contact us. Our fax number for refills is [phone]. Please allow three business for refill processing. If you need to  your refill at a new pharmacy, please contact the new pharmacy directly. The new pharmacy will help you get your medications transferred.     Scheduling:  If you have any concerns about today's visit or wish to schedule another appointment please call our office during normal business hours [phone] (8-5:00 M-F)    Contact Us:  Please call [phone] during business hours (8-5:00 M-F).  If after clinic hours, or on the weekend, please call  [phone].    Financial Assistance [phone]  [facility] Billing [phone]  Central Billing Office, [facility]: [phone]  [facility] Billing [phone]  Medical Records [phone]  [facility] Patient Bill of Rights [email or web address]       MENTAL HEALTH CRISIS NUMBERS:  For a medical emergency please call  911 or go to the nearest ER.     [facility]:   [facility] -[phone]   Crisis Residence [facility] Residence -[phone]   Walk-In Counseling Center [facility] -[phone]   COPE 24/7 [place] Mobile Team -[phone] (adults)/[phone] (child)  CHILD: [facility] needs assessment  team - [phone]      [facility]:   [facility] - [phone]   Walk-in counseling [facility] - [phone]   Walk-in counseling [facility] - [phone]   Crisis Residence [facility] Residence - [phone]  Urgent Care Adult Mental Frwoxh-[phone] mobile unit/ 24/7 crisis line    National Crisis Numbers:   National Suicide Prevention Lifeline: 5-367-301-TALK (605-540-8385)  Poison Control Center - 2-705-288-2248  WOWIO/resources for a list of additional resources (SOS)  Trans Lifeline a hotline for transgender people 1-212.890.7646  The Pankaj Project a hotline for LGBT youth 3-200-099-1016  Crisis Text Line: For any crisis 24/7   To: 590454  see www.crisistextline.org  - IF MAKING A CALL FEELS TOO HARD, send a text!         Again thank you for choosing [facility] MENTAL HE[facility] & ADDICTION [facility] and please let us know how we can best partner with you to improve you and your family's health.    You may be receiving a survey regarding this appointment. We would love to have your feedback, both positive and negative. The survey is done by an external company, so your answers are anonymous.

## 2021-03-04 NOTE — TELEPHONE ENCOUNTER
On March 4, 2021 at 1:56 PM patient called back and completed the rooming process. Jaimee Jaramillo CMA

## 2021-03-04 NOTE — TELEPHONE ENCOUNTER
On 3/4/2021, at 1346, writer called patient at mobile to confirm Virtual Visit. Writer unable to make contact with patient. Writer left detailed voice message for callback. [phone], left as call back number. JUDY Mesa, EMT

## 2021-03-24 ENCOUNTER — TELEPHONE (OUTPATIENT)
Dept: PSYCHIATRY | Facility: CLINIC | Age: 31
End: 2021-03-24

## 2021-03-24 NOTE — TELEPHONE ENCOUNTER
[facility] Call Center    Phone Message    May a detailed message be left on voicemail: yes     Reason for Call: Other:   Patient's father, Christian, called to share his concerns about the patient with Dr. Robledo. There is an HOMERO on file for Christian in case any information needs to be shared. Christian can be reached at [phone] and said there is nothing urgent.      Action Taken: Message routed to:  Other: AFIA Davis and Bret Robledo MD    Travel Screening: Not Applicable

## 2021-03-31 NOTE — TELEPHONE ENCOUNTER
Received return phone call from [facility] He reports the provider called him on Friday and answered all of his questions. He confirmed that he does not need any further assistance from this writer.

## 2021-04-23 DIAGNOSIS — F90.0 ATTENTION DEFICIT HYPERACTIVITY DISORDER (ADHD), PREDOMINANTLY INATTENTIVE TYPE: ICD-10-CM

## 2021-04-23 NOTE — TELEPHONE ENCOUNTER
[facility] Call Center    Phone Message    May a detailed message be left on voicemail: yes     Reason for Call: Medication Refill Request    Has the patient contacted the pharmacy for the refill? Yes   Name of medication being requested: vyvanse, dextroamphetamine  Provider who prescribed the medication: Venkat  Pharmacy:      [id number] - [place], [place] - [address] AT [facility] [place] [address] & [facility]    Date medication is needed: asap    Action Taken: Message routed to:  Other: nursing pool    Travel Screening: Not Applicable

## 2021-04-23 NOTE — TELEPHONE ENCOUNTER
Last seen: 3/4/21 (open)  RTC: uncertain  Cancel: none  No-show: none  Next appt: none     Medication requested: dextroamphetamine (DEXTROSTAT) 10 MG tablet  Directions: Take 1 tablet (10 mg) by mouth 3 times daily  Qty: 90  Last refilled: 3/24/21, 2/24/21, and 1/22/21 per [place]      Medication requested: lisdexamfetamine (VYVANSE) 70 MG capsule  Directions: Take 2 capsules (140 mg) by mouth 2 times daily  Qty: 120  Last refilled:  3/24/21, 2/24/21, and 1/22/21 per [place]      Medications pended and routed to provider for approval

## 2021-04-25 RX ORDER — LISDEXAMFETAMINE DIMESYLATE 70 MG/1
140 CAPSULE ORAL 2 TIMES DAILY
Qty: 120 CAPSULE | Refills: 0 | Status: SHIPPED | OUTPATIENT
Start: 2021-04-25 | End: 2021-05-21

## 2021-04-25 RX ORDER — DEXTROAMPHETAMINE SULFATE 10 MG/1
10 TABLET ORAL 3 TIMES DAILY
Qty: 90 TABLET | Refills: 0 | Status: SHIPPED | OUTPATIENT
Start: 2021-04-25 | End: 2021-05-21

## 2021-05-21 DIAGNOSIS — F90.0 ATTENTION DEFICIT HYPERACTIVITY DISORDER (ADHD), PREDOMINANTLY INATTENTIVE TYPE: ICD-10-CM

## 2021-05-21 RX ORDER — DEXTROAMPHETAMINE SULFATE 10 MG/1
10 TABLET ORAL 3 TIMES DAILY
Qty: 90 TABLET | Refills: 0 | Status: SHIPPED | OUTPATIENT
Start: 2021-05-25 | End: 2021-06-24

## 2021-05-21 RX ORDER — LISDEXAMFETAMINE DIMESYLATE 70 MG/1
140 CAPSULE ORAL 2 TIMES DAILY
Qty: 120 CAPSULE | Refills: 0 | Status: SHIPPED | OUTPATIENT
Start: 2021-05-25 | End: 2021-06-24

## 2021-05-21 NOTE — TELEPHONE ENCOUNTER
Last seen: 3/4/21  RTC: not documented  Cancel: none  No-show: none  Next appt: none     Medication requested: dextroamphetamine (DEXTROSTAT) 10 MG tablet  Directions: Take 1 tablet (10 mg) by mouth 3 times daily   Qty: 90  Last refilled: 4/27/21, 3/24/21, and 2/24/21 per [place]      Medication requested: lisdexamfetamine (VYVANSE) 70 MG capsule  Directions: Take 2 capsules (140 mg) by mouth 2 times daily  Qty: 120  Last refilled: 4/27/21, 3/24/21, and 2/24/21 per [place]      Routed to covering provider for approval

## 2021-05-21 NOTE — TELEPHONE ENCOUNTER
Jennifer Freire, Delia Marques RN   Phone Number: [phone]          Previous Messages    ----- Message -----   From: Jocelin Bob   Sent: 5/20/2021   3:11 PM CDT   To: [facility]   Subject: Rx Refill - Venkat                             [facility] Call Center     Phone Message     May a detailed message be left on voicemail: yes     Reason for Call: Medication Refill Request     Has the patient contacted the pharmacy for the refill? Yes   Name of medication being requested: vyvanse and dextrostat   Provider who prescribed the medication: Rittberg   Pharmacy: [facility] in [place] off [address]   Date medication is needed: 3-4 days       Action Taken: Message routed to:  Other: nursing     Travel Screening: Not Applicable

## 2021-06-05 DIAGNOSIS — F33.2 SEVERE EPISODE OF RECURRENT MAJOR DEPRESSIVE DISORDER, WITHOUT PSYCHOTIC FEATURES (H): ICD-10-CM

## 2021-06-05 DIAGNOSIS — F33.2 MAJOR DEPRESSIVE DISORDER, RECURRENT, SEVERE WITHOUT PSYCHOTIC FEATURES (H): ICD-10-CM

## 2021-06-09 RX ORDER — LAMOTRIGINE 200 MG/1
400 TABLET ORAL DAILY
Qty: 60 TABLET | Refills: 0 | Status: SHIPPED | OUTPATIENT
Start: 2021-06-09 | End: 2021-07-22

## 2021-06-09 RX ORDER — ARIPIPRAZOLE 2 MG/1
2 TABLET ORAL DAILY
Qty: 30 TABLET | Refills: 0 | Status: SHIPPED | OUTPATIENT
Start: 2021-06-09 | End: 2021-07-22

## 2021-06-09 NOTE — TELEPHONE ENCOUNTER
Medication requested:   ABILIFY 2 MG TAB  LAMICTAL 200 MG TAB  Last refilled:   3/4/21  2/10/21  Qty:   90  60/2      Last seen: 3/4/21  RTC: ?  Cancel: 0  No-show: 0  Next appt: 0    Refill decision:   30 day kameron refill sent to the pharmacy - including instructions for patient to call the clinic and schedule an appointment.

## 2021-06-24 DIAGNOSIS — F90.0 ATTENTION DEFICIT HYPERACTIVITY DISORDER (ADHD), PREDOMINANTLY INATTENTIVE TYPE: ICD-10-CM

## 2021-06-24 RX ORDER — LISDEXAMFETAMINE DIMESYLATE 70 MG/1
140 CAPSULE ORAL 2 TIMES DAILY
Qty: 120 CAPSULE | Refills: 0 | Status: SHIPPED | OUTPATIENT
Start: 2021-06-29 | End: 2021-07-22

## 2021-06-24 RX ORDER — DEXTROAMPHETAMINE SULFATE 10 MG/1
10 TABLET ORAL 3 TIMES DAILY
Qty: 90 TABLET | Refills: 0 | Status: SHIPPED | OUTPATIENT
Start: 2021-06-29 | End: 2021-07-22

## 2021-06-24 NOTE — TELEPHONE ENCOUNTER
Last seen: 3/4/21  RTC: not documented  Cancel: none  No-show: none  Next appt: none     Medication requested: dextroamphetamine (DEXTROSTAT) 10 MG tablet  Directions: Take 1 tablet (10 mg) by mouth 3 times daily   Qty: 90  Last refilled: 6/1/21, 4/25/21, and 3/24/21 per [place]      Medication requested: lisdexamfetamine (VYVANSE) 70 MG capsule  Directions: Take 2 capsules (140 mg) by mouth 2 times daily  Qty: 120  Last refilled:  6/1/21, 4/25/21, and 3/24/21 per [place]      Scripts pended with a start date of 6/29 and routed to provider for approval

## 2021-06-24 NOTE — TELEPHONE ENCOUNTER
[facility] Call [facility]    Phone Message    May a detailed message be left on voicemail: yes     Reason for Call: Medication Refill Request    Has the patient contacted the pharmacy for the refill? Yes   Name of medication being requested: dextroamphetamine and vyvanse  Provider who prescribed the medication: Bret Robledo MD  Pharmacy:  [id number] [place] [facility] [place] - [address] AT [facility] [place] [address] & [facility] CENTER  Date medication is needed: ASAP - pt has 3 days left of medication      Action Taken: Message routed to:  Other: Delia Patel RNCC    Travel Screening: Not Applicable

## 2021-07-12 DIAGNOSIS — F33.2 MAJOR DEPRESSIVE DISORDER, RECURRENT, SEVERE WITHOUT PSYCHOTIC FEATURES (H): ICD-10-CM

## 2021-07-12 DIAGNOSIS — F33.2 SEVERE EPISODE OF RECURRENT MAJOR DEPRESSIVE DISORDER, WITHOUT PSYCHOTIC FEATURES (H): ICD-10-CM

## 2021-07-12 DIAGNOSIS — F90.0 ATTENTION DEFICIT HYPERACTIVITY DISORDER (ADHD), PREDOMINANTLY INATTENTIVE TYPE: ICD-10-CM

## 2021-07-12 NOTE — TELEPHONE ENCOUNTER
[facility] Call [facility]    Phone Message    May a detailed message be left on voicemail: yes     Reason for Call: Other: pt request      Pt says the list of recommended providers that Dr Robledo provided for her were all through [facility], and that they do not accept her insurance. Pt has [employer] insurance    Pt requests some more places/providers to call and try to establish care with.    Pt requests that the new resources be emailed to her at [email or web address]    Action Taken: Message routed to:  Other: nursing pool and Dr Robledo    Travel Screening: Not Applicable

## 2021-07-14 NOTE — TELEPHONE ENCOUNTER
Writer called the pt and LVM notifying her that [employer] and [facility] have merged, so they should accept her insurance. Suggested she call to set up an appt and confirm with staff that her insurance is accepted. Requested a c/b if she continues to run into issues.

## 2021-07-15 NOTE — TELEPHONE ENCOUNTER
Received 6 faxed pages from health [facility] requesting medication information to be faxed to them to help determine if the Viibryd 10 mg and 40 mg tablets will be covered by the plan.  Awaiting the answer, will hold in nurse triage. .Fabiana Grubbs LPN    
Received 7 page fax of denials for pt's Viibryd Tablets, sent message to Delia Patel and Dr. Bret Robledo awaiting direction on what to do next.  Will hold originals in nurse triage until final answer is made.  .Fabiana Grubbs LPN    
Recived a 1 page faxed approval letter from [facility] indicating : Case # [id number] Viibryd 10 mg SIG Take 1 tablet by mouth for 14 days and Viibryd 40 mg SIG Take 1 tablet (40 mg) by mouth daily has been approved from 07/04/2020 to 08/04/2021.  Original letter sent to scanning.   .Fabiana Grubbs LPN    
Writer drafted letter and included an evidence based article. Will ask that letter and office visit notes from  are faxed to [employer] [facility] [employer] [facility] at [phone].  
Writer received 2 faxed pages from [employer] via [employer] requesting a PA for Viibryd 10 mg SIG Take 1 tablet by mouth for 14 days and Viibryd 40 mg SIG Take 1 tablet (40 mg) by mouth daily.  Writer initiated PA through [employer] for both strengths of the medications.  Will hold in nurse triage for answer..Fabiana Grubbs LPN    
show

## 2021-07-15 NOTE — TELEPHONE ENCOUNTER
Jocelin Bob Laura, RN  Phone Number:  [phone] (Call me)    Health Call Center     Phone Message     May a detailed message be left on voicemail: yes     Reason for Call: Other: Recommended providers are not in pt's insurance network. Pt is following-up for assistance.     Action Taken: Message routed to:  Other: nursing     Travel Screening: Not Applicable

## 2021-07-15 NOTE — TELEPHONE ENCOUNTER
Writer returned the pt's phone call. She informed this writer that she is not actually established with [facility] and even though she has [employer] insurance, they will not accept her since she is not established with [facility]. Pt is currently receiving primary care through [facility]. Agreed to discuss this with the pt's provider.

## 2021-07-16 NOTE — TELEPHONE ENCOUNTER
Bret Robledo MD Labossiere, Laura, RN  Caller: Unspecified (4 days ago, 10:35 AM)  [facility] first.   She should get a list of [facility] psychiatrists and send it to me.

## 2021-07-21 NOTE — TELEPHONE ENCOUNTER
Writer called the pt to clarify her message. She reports that she called [facility] to schedule with Dr. Mitchel Ratliff or Dr. Jacquelin Klein. At this time,  is not scheduling patients. The patient needs to call back in 1 week to do so. The pt can then be scheduled in mid September with one of the psychiatrists. In the meantime, she's requesting a refill of her medications to cover her until mid September. Agreed to discuss this with Dr. Robledo and will then call her back.

## 2021-07-21 NOTE — TELEPHONE ENCOUNTER
"Patient returned Delia's call and said she \"struck out\" with the suggested providers. She is hoping for more ideas for a new psychiatrist.  "

## 2021-07-22 ENCOUNTER — TELEPHONE (OUTPATIENT)
Dept: PSYCHIATRY | Facility: CLINIC | Age: 31
End: 2021-07-22

## 2021-07-22 RX ORDER — LISDEXAMFETAMINE DIMESYLATE 70 MG/1
140 CAPSULE ORAL 2 TIMES DAILY
Qty: 120 CAPSULE | Refills: 0 | Status: SHIPPED | OUTPATIENT
Start: 2021-07-27 | End: 2021-08-31

## 2021-07-22 RX ORDER — DEXTROAMPHETAMINE SULFATE 10 MG/1
10 TABLET ORAL 3 TIMES DAILY
Qty: 90 TABLET | Refills: 0 | Status: SHIPPED | OUTPATIENT
Start: 2021-07-27 | End: 2021-08-31

## 2021-07-22 RX ORDER — ARIPIPRAZOLE 2 MG/1
2 TABLET ORAL DAILY
Qty: 30 TABLET | Refills: 0 | Status: SHIPPED | OUTPATIENT
Start: 2021-07-22 | End: 2021-08-17

## 2021-07-22 RX ORDER — BUPROPION HYDROCHLORIDE 300 MG/1
TABLET ORAL
Qty: 30 TABLET | Refills: 0 | Status: SHIPPED | OUTPATIENT
Start: 2021-07-22 | End: 2021-08-19

## 2021-07-22 RX ORDER — LAMOTRIGINE 200 MG/1
400 TABLET ORAL DAILY
Qty: 60 TABLET | Refills: 0 | Status: SHIPPED | OUTPATIENT
Start: 2021-07-22 | End: 2021-08-18

## 2021-07-22 NOTE — TELEPHONE ENCOUNTER
Bret Robledo MD Labossiere, Laura, RN  Caller: Unspecified (1 week ago)  8/6 at 2:30.  Will she have enough meds until then?       *Writer called the pt and offered the above appt date/time. Pt agreed to this. Message sent to scheduling. Pt will need a refill of the following medications prior to 8/6:    -Vyvanse 70 mg cap  -Dextrostat 10 mg tab  -Abilify 2 mg tab  -Bupropion  mg tab  -Lamictal 200 mg tab    Last seen: 3/4/21  RTC: not documented  Cancel: none  No-show: none  Next appt: 8/6/21  Per [place] , Dextrostat and Vyvanse were filled on 6/29/21, 6/1/21, and 4/27/21    Scripts pended and routed to provider for approval

## 2021-07-22 NOTE — TELEPHONE ENCOUNTER
[facility] Call [facility]    Phone Message    May a detailed message be left on voicemail: yes     Reason for Call: Other: call back      Pt was able to schedule an appointment with a new med provider on October 11, and would like to discuss medication bridging until that time.    Action Taken: Message routed to:  Other: nursing pool    Travel Screening: Not Applicable

## 2021-07-22 NOTE — TELEPHONE ENCOUNTER
Called pt to confirm which psychiatrist she's scheduled with. No answer. Asked that she discuss this further with Dr. Robledo on 8/6.

## 2021-07-27 ENCOUNTER — TELEPHONE (OUTPATIENT)
Dept: PSYCHIATRY | Facility: CLINIC | Age: 31
End: 2021-07-27

## 2021-07-27 NOTE — TELEPHONE ENCOUNTER
Central Prior Authorization Team   Phone: [phone]      PA Initiation    Medication: Vyvanse 70mg caps  Insurance Company: [employer] - Phone [phone] Fax [phone]  Pharmacy Filling the Rx: [id number] [place] [facility] [place] - [address] AT [facility] OF [address]  Filling Pharmacy Phone: [phone]  Filling Pharmacy Fax:    Start Date: 7/27/2021

## 2021-07-27 NOTE — TELEPHONE ENCOUNTER
Prior Authorization Retail Medication Request  RENEWAL  Medication/Dose: Vyvanse 70mg caps  ICD code (if different than what is on RX):  Attention deficit hyperactivity disorder (ADHD), predominantly inattentive type [F90.0]   Previously Tried and Failed:  See 7/1/20 encounter for previous approval  Rationale:  See 7/1/20 encounter for previous approval    Insurance Name:  [employer] [place]  Insurance ID:  [id number]      Previous case ID/approval number: [id number]

## 2021-07-27 NOTE — TELEPHONE ENCOUNTER
On 7/27/2021 a PA was received from [email or web address] and is required for the Vyvanse 70mg caps, this was routed and emailed to AFIA Davis and placed in her folder, and a copy was held. Faina Bae CMA       No

## 2021-07-28 NOTE — TELEPHONE ENCOUNTER
Prior Authorization Approval    Authorization Effective Date: 6/27/2021  Authorization Expiration Date: 7/27/2022  Medication: Vyvanse 70mg caps-APPROVED  Approved Dose/Quantity:   Reference #:     Insurance Company: [employer] - Phone [phone] Fax [phone]  Expected CoPay:       CoPay Card Available:      Foundation Assistance Needed:    Which Pharmacy is filling the prescription (Not needed for infusion/clinic administered): [id number] [place] [facility] [place] - [address] AT [facility] OF [address]  Pharmacy Notified: Yes-Pharmacy will notify patient when ready.  Patient Notified: No

## 2021-08-06 ENCOUNTER — VIRTUAL VISIT (OUTPATIENT)
Dept: PSYCHIATRY | Facility: CLINIC | Age: 31
End: 2021-08-06
Attending: PSYCHIATRY & NEUROLOGY
Payer: COMMERCIAL

## 2021-08-06 DIAGNOSIS — F33.1 MAJOR DEPRESSIVE DISORDER, RECURRENT EPISODE, MODERATE (H): Primary | ICD-10-CM

## 2021-08-06 DIAGNOSIS — F90.0 ATTENTION DEFICIT HYPERACTIVITY DISORDER (ADHD), PREDOMINANTLY INATTENTIVE TYPE: ICD-10-CM

## 2021-08-06 PROCEDURE — 99212 OFFICE O/P EST SF 10 MIN: CPT | Mod: GT | Performed by: PSYCHIATRY & NEUROLOGY

## 2021-08-06 ASSESSMENT — PAIN SCALES - GENERAL: PAINLEVEL: MILD PAIN (3)

## 2021-08-06 NOTE — PATIENT INSTRUCTIONS
**For crisis resources, please see the information at the end of this document**     Patient Education      Thank you for coming to the [facility] M[facility]AL HE[facility] & ADDI[facility] [place] [facility].    Lab Testing:  If you had lab testing today and your results are reassuring or normal they will be mailed to you or sent through [employer] within 7 days. If the lab tests need quick action we will call you with the results. The phone number we will call with results is # [phone] (home) . If this is not the best number please call our clinic and change the number.    Medication Refills:  If you need any refills please call your pharmacy and they will contact us. Our fax number for refills is [phone]. Please allow three business for refill processing. If you need to  your refill at a new pharmacy, please contact the new pharmacy directly. The new pharmacy will help you get your medications transferred.     Scheduling:  If you have any concerns about today's visit or wish to schedule another appointment please call our office during normal business hours [phone] (8-5:00 M-F)    Contact Us:  Please call [phone] during business hours (8-5:00 M-F).  If after clinic hours, or on the weekend, please call  [phone].    Financial Assistance [phone]  [facility] Billing [phone]  Central Billing Office, [facility]: [phone]  [facility] Billing [phone]  Medical Records [phone]  [facility] Patient Bill of Rights [email or web address]       MENTAL HEALTH CRISIS NUMBERS:  For a medical emergency please call  911 or go to the nearest ER.     [facility]:   [facility] -[phone]   Crisis Residence [facility] Residence -[phone]   Walk-In Counseling Center [facility] -[phone]   COPE 24/7 [facility] Mobile Team -[phone] (adults)/[phone] (child)  CHILD: [facility] needs assessment  team - [phone]      [facility]:   [facility] - [phone]   Walk-in counseling [facility] - [phone]   Walk-in counseling [facility] - [phone]   Crisis Residence [facility] - [phone]  Urgent Care Adult Mental Byxjfo-[phone] mobile unit/ 24/7 crisis line    National Crisis Numbers:   National Suicide Prevention Lifeline: 0-764-110-TALK (487-923-5855)  Poison Control Center - 5-287-639-4879  Sequent/resources for a list of additional resources (SOS)  Trans Lifeline a hotline for transgender people 1-400.899.2043  The Pankaj Project a hotline for LGBT youth 7-105-756-6699  Crisis Text Line: For any crisis 24/7   To: 001127  see www.crisistextline.org  - IF MAKING A CALL FEELS TOO HARD, send a text!         Again thank you for choosing [facility] MENTAL HEALTH & ADDICTION [facility] and please let us know how we can best partner with you to improve you and your family's health.    You may be receiving a survey regarding this appointment. We would love to have your feedback, both positive and negative. The survey is done by an external company, so your answers are anonymous.

## 2021-08-10 NOTE — TELEPHONE ENCOUNTER
Confirmed the pt is meet with Dr. Rey Ratliff at [facility] on 10/11. She has received intake paperwork including ROIs.

## 2021-08-15 DIAGNOSIS — F33.2 MAJOR DEPRESSIVE DISORDER, RECURRENT, SEVERE WITHOUT PSYCHOTIC FEATURES (H): ICD-10-CM

## 2021-08-16 DIAGNOSIS — F33.2 SEVERE EPISODE OF RECURRENT MAJOR DEPRESSIVE DISORDER, WITHOUT PSYCHOTIC FEATURES (H): ICD-10-CM

## 2021-08-17 DIAGNOSIS — F33.2 MAJOR DEPRESSIVE DISORDER, RECURRENT, SEVERE WITHOUT PSYCHOTIC FEATURES (H): ICD-10-CM

## 2021-08-17 RX ORDER — ARIPIPRAZOLE 2 MG/1
TABLET ORAL
Qty: 30 TABLET | Refills: 0 | Status: SHIPPED | OUTPATIENT
Start: 2021-08-17 | End: 2021-08-31

## 2021-08-17 NOTE — TELEPHONE ENCOUNTER
ARIPiprazole (ABILIFY) 2 MG   Last refilled: 7/22/21  Qty: 30    Last seen: 8/6/21  RTC: unk  Cancel: 0  No-show: 0  Next appt: none  Refill decision:  Last note 8/6  Unsigned

## 2021-08-18 ENCOUNTER — TELEPHONE (OUTPATIENT)
Dept: PSYCHIATRY | Facility: CLINIC | Age: 31
End: 2021-08-18

## 2021-08-18 RX ORDER — LAMOTRIGINE 200 MG/1
TABLET ORAL
Qty: 60 TABLET | Refills: 0 | Status: SHIPPED | OUTPATIENT
Start: 2021-08-18 | End: 2021-08-31

## 2021-08-18 NOTE — TELEPHONE ENCOUNTER
Medication requested: LAMOTRIGINE 200MG TABLETS   Last refilled: 7/22/21  Qty: 60/0         Last seen: 8/6/21  RTC: unk  Cancel: 0  No-show: 0  Next appt: none  Refill decision: Refill pended and routed to the provider for review/determination due to    Last note 8/6  Unsigned

## 2021-08-18 NOTE — TELEPHONE ENCOUNTER
[facility] Call Center    Phone Message    May a detailed message be left on voicemail: yes     Reason for Call: Other:   Incoming call from ROLANDA Chan at [facility] from Dr. Remy Bran's office. Dr. Bran has been attempting to contact Dr. Robledo regarding a safe plan to transition patient from vyvanse to an alternative stimulant. Options include modafinil, armodafinil, and/or sunosi.    Given the patient's long standing fragile mental health history, Dr. Bran wants to see if Dr. Robledo has a preference for one of these medications for the patient.    If Dr. Robledo can call Dr. Bran directly, her cell phone is [phone]. Otherwise, if his nurse partner calls, the care team should be contacted at [phone].        Action Taken: Message routed to:  Other: Delia Patel RNCC    Travel Screening: Not Applicable

## 2021-08-19 RX ORDER — BUPROPION HYDROCHLORIDE 300 MG/1
TABLET ORAL
Qty: 30 TABLET | Refills: 0 | Status: SHIPPED | OUTPATIENT
Start: 2021-08-19 | End: 2021-08-31

## 2021-08-19 NOTE — TELEPHONE ENCOUNTER
Medication requested: BUPROPION XL 300MG TABLETS  Last refilled: 7/22/21  Qty: 30/0      Last seen: 8/6/21  RTC:  October 11, 2021 will see new psychiatrist  Cancel: 0  No-show: 0  Next appt: 0    Refill decision:   Refilled for 30 days per protocol.

## 2021-08-25 ENCOUNTER — DOCUMENTATION ONLY (OUTPATIENT)
Dept: PSYCHIATRY | Facility: CLINIC | Age: 31
End: 2021-08-25

## 2021-08-25 NOTE — PROGRESS NOTES
Lexus is on the following medications:      Current Outpatient Medications:      acetylcysteine () 600 MG CAPS capsule, Take 1 capsule (600 mg) by mouth 2 times daily, Disp: 240 capsule, Rfl: 0     amoxicillin (AMOXIL) 875 MG tablet, Take 1 tablet (875 mg) by mouth 2 times daily (Patient not taking: Reported on 3/4/2021), Disp: 20 tablet, Rfl: 0     ARIPiprazole (ABILIFY) 2 MG tablet, TAKE 1 TABLET(2 MG) BY MOUTH DAILY, Disp: 30 tablet, Rfl: 0     buPROPion (WELLBUTRIN XL) 150 MG 24 hr tablet, TAKE 3 TABLETS(450 MG) BY MOUTH EVERY MORNING, Disp: 90 tablet, Rfl: 1     buPROPion (WELLBUTRIN XL) 300 MG 24 hr tablet, TAKE ONE TABLET BY MOUTH EVERY MORNING ALONG WITH A 150MG TABLET FOR A TOTAL DAILY DOSE OF 450MG., Disp: 30 tablet, Rfl: 0     dextroamphetamine (DEXTROSTAT) 10 MG tablet, Take 1 tablet (10 mg) by mouth 3 times daily, Disp: 90 tablet, Rfl: 0     lamoTRIgine (LAMICTAL) 200 MG tablet, TAKE 2 TABLETS(400 MG) BY MOUTH DAILY, Disp: 60 tablet, Rfl: 0     levonorgestrel (MIRENA) 20 MCG/24HR IUD, 1 each by Intrauterine route once, Disp: , Rfl:      lisdexamfetamine (VYVANSE) 70 MG capsule, Take 2 capsules (140 mg) by mouth 2 times daily, Disp: 120 capsule, Rfl: 0     losartan (COZAAR) 25 MG tablet, Take 1 tablet (25 mg) by mouth daily, Disp: 1 tablet, Rfl: 0     LOSARTAN POTASSIUM PO, Take 25 mg by mouth daily, Disp: , Rfl:      metFORMIN (GLUCOPHAGE) 500 MG tablet, Take 1 tablet (500 mg) by mouth 2 times daily (with meals), Disp: 180 tablet, Rfl: 0     OMEPRAZOLE PO, Take 40 mg by mouth every morning, Disp: , Rfl:      omeprazole-sodium bicarbonate (ZEGERID)  MG per capsule, 1 capsule, Disp: , Rfl:      Ranitidine HCl (ZANTAC PO), Take 300 mg by mouth At Bedtime, Disp: , Rfl:      SUMAtriptan (IMITREX) 50 MG tablet, Take 1 tablet (50 mg) by mouth every 2 hours as needed for migraine Not to exceed 200mg in 24 hours, Disp: 30 tablet, Rfl: 0     triamcinolone (KENALOG) 0.1 % paste, Apply to the ulcer at  bedtime for the next week, Disp: 5 g, Rfl: 0     vilazodone (VIIBRYD) 40 MG TABS tablet, Take 1 tablet (40 mg) by mouth daily, Disp: 30 tablet, Rfl: 3     When she first came to see me, she was already on very high dose Vyvanse.  I continued it, since she was able attend classes and get good grades.  Attempts to decrease had resulted in return of disorganization.    Diagnoses:  MDD, recurrent in remission  Severe ADD

## 2021-09-03 NOTE — PROGRESS NOTES
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  Excited about going.  Working out  And dieting to lose weight for the trips.  Trips would be in .  Stopped alcohol.  Excess sleep.  Dieting.  Goes  To gym, online classes.  Conc. Fair.  Hard to do homework.  States she is lazy.  No calls for early refills or lost prescriptions.  She was on this high dose when she was referred to me, but it h        Suicide Thoughts: Less passive thoughts        Psychiatry Review of Symptoms:  Severe attntional problems        Medications: No side effects.  Viibryd has been helpful.        Allergies:   Allergies   Allergen Reactions     Augmentin Diarrhea     C-diff           Medical ROS: Upper endoscopy - GERD        Diagnosis:  MDD, Recurrent, mostly in remission.  Severe ADD helped by high dose Vyvanse        Plan: No changes        Bret Robledo MD    Video- Visit Details  Type of service:  video visit for med managementTime of service:  Date: 10/22/20  Reason for video visit: Covid 19  Originating Site (patient location):  [facility]   Location- [place]  Distant Site (provider location):  [facility]  Mode of Communication:  Video Conference via [email or web address]  Consent:  Patient has given verbal consent for video visit?: yes            D: 2021   T: 2021   MT: LISBETH    Name:     ERICK ORELLANA  MRN:      [id number]        Account:      [id number]   :      1990           Service Date: 10/22/2020       Document: [id number]    "

## 2021-09-03 NOTE — PROGRESS NOTES
Service Date: 10/12/2020    Subjective: Feeling better.  Some down days, not in past week.  Triggered.  Cleaning.  School.  Sleep too much.  Not manic.  Boredom and tired.  Gained 40# since March.  Motivation poor.    Trying to set goal (trips) to work towards.  Low anxiety        Suicide Thoughts: No, but wouldn't mind if didn't wake up        Psychiatry Review of Symptoms: No Panic, No psychosis, No alcohol past week        Medications: No side effects        Allergies: None new        Medical ROS: Upper endoscopy - everything fine        Diagnosis:  MDD, recurrent, moderate;  ADD        Plan:  No changes today        Bret Robledo MD        D: 2021   T: 2021   MT: LISBETH    Name:     ERICK ORELLANAKd  MRN:      [id number]        Account:      [id number]   :      1990           Service Date: 10/12/2020       Document: [id number]

## 2021-09-03 NOTE — PROGRESS NOTES
"Service Date: 2020    Subjective: \"Fair.\" Trying to graduate has not completed thesis.  Scattered when tries to complete it.  Hard   working remote.  Now has elliptical which she is using.  Sleeping too much.  Tired & Sleepy.  Eating started   To get better than poor again.  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  Sees bariatrician next week.  Not anhedonia.  Enjoyed holidays.         Suicide Thoughts: Chronic passive        Psychiatry Review of Symptoms: \"A little bit of alcohol.\"  no panic. No psychosis.  No maryann        Medications: Vyvanse 140 BID; Wellbutrin 450; Dextrostat 10 BID; Lamictal 400; Viibryd 40         Allergies: None new        Medical ROS: Hypersomnia; morbid obesity; Cortisol injection in knee        Diagnosis: MDD; ADD, Trich        Plan: No Change        Bret Robledo MD        D: 2021   T: 2021   MT: LISBETH    Name:     ERICK ORELLANA  MRN:      [id number]        Account:      [id number]   :      1990           Service Date: 2020       Document: [id number]    "

## 2021-09-19 ENCOUNTER — HEALTH MAINTENANCE LETTER (OUTPATIENT)
Age: 31
End: 2021-09-19

## 2021-10-11 ENCOUNTER — TELEPHONE (OUTPATIENT)
Dept: PSYCHIATRY | Facility: CLINIC | Age: 31
End: 2021-10-11

## 2021-10-11 NOTE — TELEPHONE ENCOUNTER
On 10/8/2021 the patient signed an HOMERO authorizing medical records to be exchanged between [facility] and [facility] for the purpose of continuing care. The request was faxed to our medical records department at [phone] and sent to scanning on October 11, 2021 and held a copy in Psychiatry until scanning is confirmed. Zen [place], EMT

## 2021-10-20 NOTE — PROGRESS NOTES
"Service Date: 2020    Subjective: Tired.  \"Ok\". Didn't sleep well last night, usually sleeps too much. 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 Reading and writing. Living with parents because of finances. Still has to complete senior thesis. Hopes to go to grad school. Not working secondary to Covid, risk with her weight. Some interest. Fun going through old makeup. Fiction writing friends. Wants to get exercise equipment since cannot go to gym. Anxiety ok. No panic.      Psychiatry Review of Symptoms: No maryann or psychosis.  No SI        Medications: No side effects. Today is day # on Viibryd.        Allergies: None new.        Medical ROS: Weight gain, GERD cough mostly at night. Will be having an upper endoscopy next week.        Diagnosis: MDD, ADD.        Plan: Continue Viibryd trial.    Video- Visit Details  Type of service:  video visit for med management  Time of service:    Date:  20        Video Start Time:  4:00 PM        Video End Time: 4:23 PM    Reason for video visit:  Covid 1-  Originating Site (patient location):  [facility]  Location- [place]  Distant Site (provider location):  [facility]  Mode of Communication:  Video Conference via [email or web address]  Consent:  Patient has given verbal consent for video visit?: Yes            Bret Robledo MD        D: 10/20/2021   T: 10/20/2021   MT: JONNY    Name:     ERICK ORELLANA  MRN:      [id number]        Account:      [id number]   :      1990           Service Date: 2020       Document: [id number]    "

## 2021-10-21 NOTE — PROGRESS NOTES
Service Date: 2020    Subjective: Was in [place] and evacuated secondary to Covid. Living with parents, since gave up apartment. Rarely leaves secondary to Covid. Just finished classes that she had to complete. Not bored. Sad, but not depression.        Suicide Thoughts: No.        Psychiatry Review of Symptoms: No alcohol.        Medications: NAC, Vyvanse 140 BID, Viibryd 40.        Allergies: None new.        Medical ROS: Morbid obesity.        Diagnosis: MDD, recurrent.  ADD        Plan: Viibryd to continue.    Video- Visit Details  Type of service:  video visit for med management  Time of service:    Date:  20    Video Start Time:  1:32 PM        Video End Time:  2:00 pm    Reason for video visit: covid-19  Originating Site (patient location):  [facility]  Location- [place]  Distant Site (provider location):  [facility]  Mode of Communication: [email or web address]  Consent:  Patient has given verbal consent for video visit?: yes            Bret Robledo MD        D: 10/21/2021   T: 10/21/2021   MT: JONNY    Name:     ASHOKMAGNO ERICK ARREDONDOKd  MRN:      [id number]        Account:      [id number]   :      1990           Service Date: 2020       Document: [id number]

## 2021-10-29 DIAGNOSIS — F33.2 MAJOR DEPRESSIVE DISORDER, RECURRENT, SEVERE WITHOUT PSYCHOTIC FEATURES (H): ICD-10-CM

## 2021-11-02 RX ORDER — BUPROPION HYDROCHLORIDE 300 MG/1
TABLET ORAL
Qty: 30 TABLET | Refills: 1 | OUTPATIENT
Start: 2021-11-02

## 2022-01-09 ENCOUNTER — HEALTH MAINTENANCE LETTER (OUTPATIENT)
Age: 32
End: 2022-01-09

## 2022-03-31 ENCOUNTER — TELEPHONE (OUTPATIENT)
Dept: PSYCHIATRY | Facility: CLINIC | Age: 32
End: 2022-03-31
Payer: COMMERCIAL

## 2022-03-31 NOTE — TELEPHONE ENCOUNTER
On 3/30/2022 the patient signed an HOMERO authorizing medical records to be exchanged between [facility] and [facility] for the purpose of continuing care. The request was faxed to our medical records department at [phone] and sent to scanning on March 31, 2022 and held a copy in Psychiatry until scanning is confirmed. Sahil Holland, EMT

## 2022-11-20 ENCOUNTER — HEALTH MAINTENANCE LETTER (OUTPATIENT)
Age: 32
End: 2022-11-20

## 2023-04-16 ENCOUNTER — HEALTH MAINTENANCE LETTER (OUTPATIENT)
Age: 33
End: 2023-04-16

## 2023-05-30 NOTE — TELEPHONE ENCOUNTER
LVM to confirm or deny Dr Fernanda Yadav as her PCP Olivia Capone Laura, RN    Phone Number: [phone]               Walter Lin,     Pt wanted to speak to you about her medication.     buPROPion (WELLBUTRIN XL) 150 MG 24 hr tablet   buPROPion (WELLBUTRIN XL) 300 MG 24 hr tablet     PT said that she needs prescription written as 3 separate 150 MG. She said that if that can be changed then she doesn't need a call back, otherwise to contact her if unable to change and if there's anything else.       Thank you,   Olivia        Writer reviewed pt's chart. Appears the above medications were each witting for a 90 d/s.     Resent rx for 30 d/s with 2 refills per pt's request to [facility] on [address].

## 2023-07-07 NOTE — MR AVS SNAPSHOT
After Visit Summary   7/12/2018    Lexus Uribe    MRN: [id number]           Patient Information     Date Of Birth          1990        Visit Information        Provider Department      7/12/2018 4:30 PM Bret Robledo MD [facility]        Today's Diagnoses     Major depressive disorder, recurrent, severe without psychotic features (H)        Major depressive disorder, recurrent episode, moderate (H)        Attention deficit hyperactivity disorder (ADHD), predominantly inattentive type        Moderate episode of recurrent major depressive disorder (H)        Severe episode of recurrent major depressive disorder, without psychotic features (H)           Follow-ups after your visit        Your next 10 appointments already scheduled     Oct 11, 2018  4:30 PM CDT   Adult Med Follow UP with Bret Robledo MD   Psychiatry Clinic ([facility])    [place] [id number] [address] [phone]              Who to contact     Please call your clinic at [phone] to:    Ask questions about your health    Make or cancel appointments    Discuss your medicines    Learn about your test results    Speak to your doctor            Additional Information About Your Visit        [employer] Information     myOrder gives you secure access to your electronic health record. If you see a primary care provider, you can also send messages to your care team and make appointments. If you have questions, please call your primary care clinic.  If you do not have a primary care provider, please call [phone] and they will assist you.      myOrder is an electronic gateway that provides easy, online access to your medical records. With myOrder, you can request a clinic appointment, read your test results, renew a prescription or communicate with your care team.     To access your existing account, please contact your [facility]  Clinic or call [phone] for assistance.        Care EveryWhere ID     This is your Care EveryWhere ID. This could be used by other organizations to access your [facility] medical records  [id number]         Blood Pressure from Last 3 Encounters:   04/18/16 (!) 165/109   03/28/16 (!) 170/123   09/21/13 (!) 176/108    Weight from Last 3 Encounters:   08/24/16 (!) 191.1 kg (421 lb 3.2 oz)   07/26/16 (!) 188.8 kg (416 lb 3.2 oz)   06/03/16 (!) 181.4 kg (400 lb)              Today, you had the following     No orders found for display         Today's Medication Changes          These changes are accurate as of 7/12/18 11:59 PM.  If you have any questions, ask your nurse or doctor.               Start taking these medicines.        Dose/Directions    dextroamphetamine 10 MG tablet   Commonly known as:  DEXTROSTAT   Used for:  Attention deficit hyperactivity disorder (ADHD), predominantly inattentive type        Dose:  10 mg   Take 1 tablet (10 mg) by mouth 2 times daily   Quantity:  60 tablet   Refills:  0       lisdexamfetamine 70 MG capsule   Commonly known as:  VYVANSE   Used for:  Attention deficit hyperactivity disorder (ADHD), predominantly inattentive type        Dose:  140 mg   Take 2 capsules (140 mg) by mouth 2 times daily   Quantity:  120 capsule   Refills:  0         These medicines have changed or have updated prescriptions.        Dose/Directions    buPROPion 150 MG 24 hr tablet   Commonly known as:  WELLBUTRIN XL   This may have changed:  medication strength   Used for:  Major depressive disorder, recurrent episode, moderate (H)        Dose:  300 mg   Take 2 tablets (300 mg) by mouth every morning   Quantity:  180 tablet   Refills:  0            Where to get your medicines      These medications were sent to [facility] [id number] [place] [address] & [address] [address]     Phone:  [phone]     ARIPiprazole 10 MG tablet     buPROPion 150 MG 24 hr tablet    FLUoxetine 40 MG capsule    lamoTRIgine 200 MG tablet         Some of these will need a paper prescription and others can be bought over the counter.  Ask your nurse if you have questions.     Bring a paper prescription for each of these medications     dextroamphetamine 10 MG tablet    lisdexamfetamine 70 MG capsule                Primary Care Provider Office Phone # Fax #    Renetta Shafer [phone]       [facility] [address] [facility] [address]        Equal Access to Services     DANIA HECK : Hadii aad ku hadasho Soomaali, waaxda luqadaha, qaybta kaalmada adeegyada, waxay idiin hayaan ziyad garcia . So [facility] [phone].    ATENCIÓN: Si habla español, tiene a amador disposición servicios gratuitos de asistencia lingüística. [facility] [phone].    We comply with applicable federal civil rights laws and [place] laws. We do not discriminate on the basis of race, color, national origin, age, disability, sex, sexual orientation, or gender identity.            Thank you!     Thank you for choosing PSYCHIATRY CLINIC  for your care. Our goal is always to provide you with excellent care. Hearing back from our patients is one way we can continue to improve our services. Please take a few minutes to complete the written survey that you may receive in the mail after your visit with us. Thank you!             Your Updated Medication List - Protect others around you: Learn how to safely use, store and throw away your medicines at www.disposemymeds.org.          This list is accurate as of 7/12/18 11:59 PM.  Always use your most recent med list.                   Brand Name Dispense Instructions for use Diagnosis    ARIPiprazole 10 MG tablet    ABILIFY    90 tablet    Take 1 tablet (10 mg) by mouth daily    Major depressive disorder, recurrent, severe without psychotic features (H)       buPROPion 150 MG 24 hr tablet    WELLBUTRIN XL    180 tablet     Take 2 tablets (300 mg) by mouth every morning    Major depressive disorder, recurrent episode, moderate (H)       dextroamphetamine 10 MG tablet    DEXTROSTAT    60 tablet    Take 1 tablet (10 mg) by mouth 2 times daily    Attention deficit hyperactivity disorder (ADHD), predominantly inattentive type       FLUoxetine 40 MG capsule    PROzac    180 capsule    Take 2 capsules (80 mg) by mouth daily    Moderate episode of recurrent major depressive disorder (H)       lamoTRIgine 200 MG tablet    LaMICtal    180 tablet    Take 2 tablets (400 mg) by mouth daily    Severe episode of recurrent major depressive disorder, without psychotic features (H)       lisdexamfetamine 70 MG capsule    VYVANSE    120 capsule    Take 2 capsules (140 mg) by mouth 2 times daily    Attention deficit hyperactivity disorder (ADHD), predominantly inattentive type       * LOSARTAN POTASSIUM PO      Take 25 mg by mouth daily        * losartan 25 MG tablet    COZAAR    1 tablet    Take 1 tablet (25 mg) by mouth daily    Benign essential hypertension       OMEPRAZOLE PO      Take 40 mg by mouth every morning        omeprazole-sodium bicarbonate  MG per capsule    ZEGERID     1 capsule        ZANTAC PO      Take 300 mg by mouth At Bedtime        * Notice:  This list has 2 medication(s) that are the same as other medications prescribed for you. Read the directions carefully, and ask your doctor or other care provider to review them with you.       Zoryve Counseling:  I discussed with the patient that Zoryve is not for use in the eyes, mouth or vagina. The most commonly reported side effects include diarrhea, headache, insomnia, application site pain, upper respiratory tract infections, and urinary tract infections.  All of the patient's questions and concerns were addressed.

## 2024-03-02 NOTE — PROGRESS NOTES
"VIDEO VISIT  Lexus Uribe is a 31 year old patient who is being evaluated via a billable video visit.      The patient has been notified of following:   \"This video visit will be conducted via a call between you and your physician/provider. We have found that certain health care needs can be provided without the need for an in-person physical exam. This service lets us provide the care you need with a video conversation. If a prescription is necessary we can send it directly to your pharmacy. If lab work is needed we can place an order for that and you can then stop by our lab to have the test done at a later time. Insurers are generally covering virtual visits as they would in-office visits so billing should not be different than normal.  If for some reason you do get billed incorrectly, you should contact the billing office to correct it and that number is in the AVS .    Video Conference to be completed via:  [email or web address]    Patient has given verbal consent for video visit?:  Yes    Patient would prefer that any video invitations be sent by: Send to e-mail at: [email or web address]      How would patient like to obtain AVS?:  Evelia    AVS SmartPhrase [PsychAVS] has been placed in 'Patient Instructions':  Yes    "
Service Date: 2021    Subjective: Revision of bariatric surgery and hernia repair. The decision had positive impact on mood. Working 40 hrs/week. Working from home and sleep a bit excess. Unable to eat secondary to surgery. Lost 17 pounds. Energy has not come back yet.  Suicide thoughts: No. Mood positive. Grad school end   Psychiatry review of symptoms: No alcohol or drugs  Medications: Abilify 2; Wellbutrin ; Adderal 10 TID (when needs to concentrate better); Lamictal 400; Vyvanse 140 BID  Allergies: None new  Medical ROS: Morbid obesity  Diagnosis: MDD; severe ADD  Plan: 2021 will see new psychiatrist   No med changes today      Bret Robledo MD        D: 2021   T: 2021   MT: MARGARET    Name:     ERICK ORELLANA  MRN:      [id number]        Account:      [id number]   :      1990           Service Date: 2021       Document: [id number]    
Patient requests all Lab, Cardiology, and Radiology Results on their Discharge Instructions

## 2024-06-22 ENCOUNTER — HEALTH MAINTENANCE LETTER (OUTPATIENT)
Age: 34
End: 2024-06-22

## 2024-08-17 ENCOUNTER — MYC REFILL (OUTPATIENT)
Dept: PSYCHIATRY | Facility: CLINIC | Age: 34
End: 2024-08-17
Payer: COMMERCIAL

## 2024-08-17 DIAGNOSIS — F90.0 ATTENTION DEFICIT HYPERACTIVITY DISORDER (ADHD), PREDOMINANTLY INATTENTIVE TYPE: ICD-10-CM

## 2024-08-19 RX ORDER — LISDEXAMFETAMINE DIMESYLATE 70 MG/1
140 CAPSULE ORAL 2 TIMES DAILY
Qty: 120 CAPSULE | Refills: 0 | OUTPATIENT
Start: 2024-08-19

## 2024-08-19 RX ORDER — DEXTROAMPHETAMINE SULFATE 10 MG/1
10 TABLET ORAL 3 TIMES DAILY
Qty: 90 TABLET | Refills: 0 | OUTPATIENT
Start: 2024-08-19

## 2025-01-15 NOTE — TELEPHONE ENCOUNTER
Medication requested: lamoTRIgine (LAMICTAL) 200 MG tablet  Last refilled: 3/21/19  Qty: 60      Last seen: 4/4/19  RTC: ?  Cancel: 0  No-show: 0  Next appt: 8/1/19    Refill decision:   Refill pended and routed to the provider for review/determination due to   NOTE NOT COMPLETED FROM 4/4/19      
No

## 2025-04-03 NOTE — ADDENDUM NOTE
Addended by: MICHEAL POWELL on: 7/22/2021 09:13 AM     Modules accepted: Orders     DISPLAY PLAN FREE TEXT

## 2025-08-21 ENCOUNTER — TELEPHONE (OUTPATIENT)
Dept: SURGERY | Facility: CLINIC | Age: 35
End: 2025-08-21
Payer: COMMERCIAL

## 2025-08-23 ENCOUNTER — HEALTH MAINTENANCE LETTER (OUTPATIENT)
Age: 35
End: 2025-08-23